# Patient Record
Sex: FEMALE | Race: WHITE | Employment: FULL TIME | URBAN - METROPOLITAN AREA
[De-identification: names, ages, dates, MRNs, and addresses within clinical notes are randomized per-mention and may not be internally consistent; named-entity substitution may affect disease eponyms.]

---

## 2017-01-09 ENCOUNTER — OFFICE VISIT (OUTPATIENT)
Dept: FAMILY MEDICINE CLINIC | Age: 42
End: 2017-01-09

## 2017-01-09 VITALS
BODY MASS INDEX: 50.05 KG/M2 | SYSTOLIC BLOOD PRESSURE: 126 MMHG | RESPIRATION RATE: 12 BRPM | HEIGHT: 62 IN | DIASTOLIC BLOOD PRESSURE: 76 MMHG | TEMPERATURE: 98 F | WEIGHT: 272 LBS | HEART RATE: 86 BPM

## 2017-01-09 DIAGNOSIS — J01.00 ACUTE NON-RECURRENT MAXILLARY SINUSITIS: Primary | ICD-10-CM

## 2017-01-09 PROCEDURE — 99213 OFFICE O/P EST LOW 20 MIN: CPT | Performed by: FAMILY MEDICINE

## 2017-01-09 RX ORDER — AMOXICILLIN 500 MG/1
1000 CAPSULE ORAL 2 TIMES DAILY
Qty: 40 CAPSULE | Refills: 0 | Status: SHIPPED | OUTPATIENT
Start: 2017-01-09 | End: 2017-01-19

## 2017-01-09 ASSESSMENT — PATIENT HEALTH QUESTIONNAIRE - PHQ9
1. LITTLE INTEREST OR PLEASURE IN DOING THINGS: 0
SUM OF ALL RESPONSES TO PHQ QUESTIONS 1-9: 0
2. FEELING DOWN, DEPRESSED OR HOPELESS: 0
SUM OF ALL RESPONSES TO PHQ9 QUESTIONS 1 & 2: 0

## 2017-01-30 RX ORDER — ATORVASTATIN CALCIUM 80 MG/1
80 TABLET, FILM COATED ORAL DAILY
Qty: 90 TABLET | Refills: 1 | Status: SHIPPED | OUTPATIENT
Start: 2017-01-30 | End: 2017-07-28 | Stop reason: SDUPTHER

## 2017-03-02 RX ORDER — FENOFIBRATE 145 MG/1
145 TABLET, COATED ORAL DAILY
Qty: 90 TABLET | Refills: 0 | Status: SHIPPED | OUTPATIENT
Start: 2017-03-02 | End: 2017-06-09 | Stop reason: SDUPTHER

## 2017-03-03 RX ORDER — METFORMIN HYDROCHLORIDE 500 MG/1
TABLET, EXTENDED RELEASE ORAL
Qty: 180 TABLET | Refills: 6 | Status: SHIPPED | OUTPATIENT
Start: 2017-03-03 | End: 2018-08-27 | Stop reason: SDUPTHER

## 2017-03-10 ENCOUNTER — EMPLOYEE WELLNESS (OUTPATIENT)
Dept: OTHER | Age: 42
End: 2017-03-10

## 2017-03-14 DIAGNOSIS — Z79.4 INSULIN DEPENDENT TYPE 2 DIABETES MELLITUS, CONTROLLED (HCC): ICD-10-CM

## 2017-03-14 DIAGNOSIS — E11.9 INSULIN DEPENDENT TYPE 2 DIABETES MELLITUS, CONTROLLED (HCC): ICD-10-CM

## 2017-03-21 RX ORDER — OMEPRAZOLE 20 MG/1
20 CAPSULE, DELAYED RELEASE ORAL 2 TIMES DAILY
Qty: 180 CAPSULE | Refills: 1 | Status: SHIPPED | OUTPATIENT
Start: 2017-03-21 | End: 2018-02-13 | Stop reason: SDUPTHER

## 2017-03-30 ENCOUNTER — OFFICE VISIT (OUTPATIENT)
Dept: ENDOCRINOLOGY | Age: 42
End: 2017-03-30

## 2017-03-30 VITALS
WEIGHT: 276 LBS | BODY MASS INDEX: 50.79 KG/M2 | DIASTOLIC BLOOD PRESSURE: 78 MMHG | HEART RATE: 85 BPM | RESPIRATION RATE: 16 BRPM | HEIGHT: 62 IN | OXYGEN SATURATION: 96 % | SYSTOLIC BLOOD PRESSURE: 132 MMHG

## 2017-03-30 DIAGNOSIS — I10 ESSENTIAL HYPERTENSION: ICD-10-CM

## 2017-03-30 DIAGNOSIS — Z79.4 INSULIN DEPENDENT TYPE 2 DIABETES MELLITUS, CONTROLLED (HCC): Primary | ICD-10-CM

## 2017-03-30 DIAGNOSIS — E78.49 OTHER HYPERLIPIDEMIA: ICD-10-CM

## 2017-03-30 DIAGNOSIS — E11.9 INSULIN DEPENDENT TYPE 2 DIABETES MELLITUS, CONTROLLED (HCC): Primary | ICD-10-CM

## 2017-03-30 LAB — HBA1C MFR BLD: 6.3 %

## 2017-03-30 PROCEDURE — 99214 OFFICE O/P EST MOD 30 MIN: CPT | Performed by: INTERNAL MEDICINE

## 2017-03-30 PROCEDURE — 83036 HEMOGLOBIN GLYCOSYLATED A1C: CPT | Performed by: INTERNAL MEDICINE

## 2017-03-31 LAB
CREATININE URINE: 115.4 MG/DL (ref 28–259)
MICROALBUMIN UR-MCNC: <1.2 MG/DL
MICROALBUMIN/CREAT UR-RTO: NORMAL MG/G (ref 0–30)

## 2017-04-10 RX ORDER — MAGNESIUM OXIDE 400 MG/1
TABLET ORAL
Qty: 90 TABLET | Refills: 1 | Status: SHIPPED | OUTPATIENT
Start: 2017-04-10 | End: 2018-01-08 | Stop reason: SDUPTHER

## 2017-04-13 ENCOUNTER — OFFICE VISIT (OUTPATIENT)
Dept: ORTHOPEDIC SURGERY | Age: 42
End: 2017-04-13

## 2017-04-13 DIAGNOSIS — M70.62 GREATER TROCHANTERIC BURSITIS OF LEFT HIP: Primary | ICD-10-CM

## 2017-04-13 PROCEDURE — 99999 PR OFFICE/OUTPT VISIT,PROCEDURE ONLY: CPT | Performed by: NURSE PRACTITIONER

## 2017-04-13 RX ORDER — METHYLPREDNISOLONE 4 MG/1
TABLET ORAL
Qty: 1 KIT | Refills: 0 | Status: SHIPPED | OUTPATIENT
Start: 2017-04-13 | End: 2017-11-09 | Stop reason: ALTCHOICE

## 2017-04-18 ENCOUNTER — TELEPHONE (OUTPATIENT)
Dept: ORTHOPEDIC SURGERY | Age: 42
End: 2017-04-18

## 2017-04-18 RX ORDER — CELECOXIB 200 MG/1
200 CAPSULE ORAL DAILY
Qty: 60 CAPSULE | Refills: 1 | Status: SHIPPED | OUTPATIENT
Start: 2017-04-18 | End: 2020-01-24 | Stop reason: ALTCHOICE

## 2017-06-09 RX ORDER — FENOFIBRATE 145 MG/1
TABLET, COATED ORAL
Qty: 90 TABLET | Refills: 0 | Status: SHIPPED | OUTPATIENT
Start: 2017-06-09 | End: 2017-09-05 | Stop reason: SDUPTHER

## 2017-07-07 RX ORDER — MAGNESIUM OXIDE 400 MG/1
TABLET ORAL
Qty: 90 TABLET | Refills: 1 | Status: SHIPPED | OUTPATIENT
Start: 2017-07-07 | End: 2017-11-09 | Stop reason: SDUPTHER

## 2017-07-20 ENCOUNTER — OFFICE VISIT (OUTPATIENT)
Dept: ENDOCRINOLOGY | Age: 42
End: 2017-07-20

## 2017-07-20 VITALS
DIASTOLIC BLOOD PRESSURE: 72 MMHG | HEIGHT: 62 IN | SYSTOLIC BLOOD PRESSURE: 128 MMHG | RESPIRATION RATE: 16 BRPM | HEART RATE: 76 BPM | BODY MASS INDEX: 51.38 KG/M2 | WEIGHT: 279.2 LBS

## 2017-07-20 DIAGNOSIS — E11.9 INSULIN DEPENDENT TYPE 2 DIABETES MELLITUS, CONTROLLED (HCC): Primary | ICD-10-CM

## 2017-07-20 DIAGNOSIS — Z79.4 INSULIN DEPENDENT TYPE 2 DIABETES MELLITUS, CONTROLLED (HCC): Primary | ICD-10-CM

## 2017-07-20 DIAGNOSIS — E78.49 OTHER HYPERLIPIDEMIA: ICD-10-CM

## 2017-07-20 DIAGNOSIS — I10 ESSENTIAL HYPERTENSION: ICD-10-CM

## 2017-07-20 LAB — HBA1C MFR BLD: 6.8 %

## 2017-07-20 PROCEDURE — 99214 OFFICE O/P EST MOD 30 MIN: CPT | Performed by: INTERNAL MEDICINE

## 2017-07-20 PROCEDURE — 83036 HEMOGLOBIN GLYCOSYLATED A1C: CPT | Performed by: INTERNAL MEDICINE

## 2017-07-26 ENCOUNTER — OFFICE VISIT (OUTPATIENT)
Dept: FAMILY MEDICINE CLINIC | Age: 42
End: 2017-07-26

## 2017-07-26 VITALS
RESPIRATION RATE: 12 BRPM | HEART RATE: 94 BPM | WEIGHT: 278 LBS | BODY MASS INDEX: 51.16 KG/M2 | SYSTOLIC BLOOD PRESSURE: 128 MMHG | DIASTOLIC BLOOD PRESSURE: 68 MMHG | HEIGHT: 62 IN

## 2017-07-26 DIAGNOSIS — G47.33 OSA ON CPAP: ICD-10-CM

## 2017-07-26 DIAGNOSIS — E11.69 DM TYPE 2 WITH DIABETIC DYSLIPIDEMIA (HCC): Primary | ICD-10-CM

## 2017-07-26 DIAGNOSIS — E78.5 DM TYPE 2 WITH DIABETIC DYSLIPIDEMIA (HCC): Primary | ICD-10-CM

## 2017-07-26 DIAGNOSIS — E83.42 HYPOMAGNESEMIA: ICD-10-CM

## 2017-07-26 DIAGNOSIS — Z12.39 BREAST CANCER SCREENING: ICD-10-CM

## 2017-07-26 DIAGNOSIS — K21.9 GASTROESOPHAGEAL REFLUX DISEASE WITHOUT ESOPHAGITIS: ICD-10-CM

## 2017-07-26 DIAGNOSIS — I10 ESSENTIAL HYPERTENSION: ICD-10-CM

## 2017-07-26 DIAGNOSIS — E78.00 PURE HYPERCHOLESTEROLEMIA: ICD-10-CM

## 2017-07-26 DIAGNOSIS — Z99.89 OSA ON CPAP: ICD-10-CM

## 2017-07-26 LAB
ALBUMIN SERPL-MCNC: 4.6 G/DL (ref 3.4–5)
ALP BLD-CCNC: 41 U/L (ref 40–129)
ALT SERPL-CCNC: 17 U/L (ref 10–40)
ANION GAP SERPL CALCULATED.3IONS-SCNC: 19 MMOL/L (ref 3–16)
AST SERPL-CCNC: 14 U/L (ref 15–37)
BILIRUB SERPL-MCNC: <0.2 MG/DL (ref 0–1)
BILIRUBIN DIRECT: <0.2 MG/DL (ref 0–0.3)
BILIRUBIN, INDIRECT: ABNORMAL MG/DL (ref 0–1)
BUN BLDV-MCNC: 20 MG/DL (ref 7–20)
CALCIUM SERPL-MCNC: 10.1 MG/DL (ref 8.3–10.6)
CHLORIDE BLD-SCNC: 103 MMOL/L (ref 99–110)
CO2: 21 MMOL/L (ref 21–32)
CREAT SERPL-MCNC: 0.7 MG/DL (ref 0.6–1.1)
GFR AFRICAN AMERICAN: >60
GFR NON-AFRICAN AMERICAN: >60
GLUCOSE BLD-MCNC: 150 MG/DL (ref 70–99)
MAGNESIUM: 2.1 MG/DL (ref 1.8–2.4)
POTASSIUM SERPL-SCNC: 4.3 MMOL/L (ref 3.5–5.1)
SODIUM BLD-SCNC: 143 MMOL/L (ref 136–145)
TOTAL PROTEIN: 7.4 G/DL (ref 6.4–8.2)

## 2017-07-26 PROCEDURE — 36415 COLL VENOUS BLD VENIPUNCTURE: CPT | Performed by: FAMILY MEDICINE

## 2017-07-26 PROCEDURE — 99214 OFFICE O/P EST MOD 30 MIN: CPT | Performed by: FAMILY MEDICINE

## 2017-07-28 RX ORDER — ATORVASTATIN CALCIUM 80 MG/1
TABLET, FILM COATED ORAL
Qty: 90 TABLET | Refills: 1 | Status: SHIPPED | OUTPATIENT
Start: 2017-07-28 | End: 2018-01-22 | Stop reason: SDUPTHER

## 2017-07-28 RX ORDER — HYDROCHLOROTHIAZIDE 25 MG/1
TABLET ORAL
Qty: 90 TABLET | Refills: 1 | Status: SHIPPED | OUTPATIENT
Start: 2017-07-28 | End: 2018-01-22 | Stop reason: SDUPTHER

## 2017-08-01 ENCOUNTER — TELEPHONE (OUTPATIENT)
Dept: PULMONOLOGY | Age: 42
End: 2017-08-01

## 2017-08-30 ENCOUNTER — HOSPITAL ENCOUNTER (OUTPATIENT)
Dept: WOMENS IMAGING | Age: 42
Discharge: HOME | End: 2017-08-30
Attending: FAMILY MEDICINE | Admitting: FAMILY MEDICINE

## 2017-08-30 DIAGNOSIS — Z12.39 BREAST CANCER SCREENING: ICD-10-CM

## 2017-09-05 RX ORDER — LISINOPRIL 40 MG/1
40 TABLET ORAL DAILY
Qty: 90 TABLET | Refills: 1 | Status: SHIPPED | OUTPATIENT
Start: 2017-09-05 | End: 2018-08-28 | Stop reason: SDUPTHER

## 2017-09-05 RX ORDER — BLOOD SUGAR DIAGNOSTIC
STRIP MISCELLANEOUS
Qty: 400 STRIP | Refills: 3 | Status: SHIPPED | OUTPATIENT
Start: 2017-09-05 | End: 2018-12-17 | Stop reason: SDUPTHER

## 2017-09-05 RX ORDER — FENOFIBRATE 145 MG/1
145 TABLET, COATED ORAL DAILY
Qty: 90 TABLET | Refills: 1 | Status: SHIPPED | OUTPATIENT
Start: 2017-09-05 | End: 2018-03-19 | Stop reason: SDUPTHER

## 2017-10-02 NOTE — TELEPHONE ENCOUNTER
From: Sylvia Jefferson  To:  Du Porter MD  Sent: 10/2/2017 8:48 AM EDT  Subject: Medication Renewal Request    Original authorizing provider: MD Sylvia Bolivar Mt would like a refill of the following medications:  canagliflozin (INVOKANA) 300 MG TABS tablet Osmel Goss MD]    Preferred pharmacy: [address] [place] [address] [phone] -  [phone]    Comment:

## 2017-10-11 DIAGNOSIS — Z79.4 INSULIN DEPENDENT TYPE 2 DIABETES MELLITUS, CONTROLLED (HCC): ICD-10-CM

## 2017-10-11 DIAGNOSIS — E11.9 INSULIN DEPENDENT TYPE 2 DIABETES MELLITUS, CONTROLLED (HCC): ICD-10-CM

## 2017-10-11 RX ORDER — PEN NEEDLE, DIABETIC 32GX 5/32"
NEEDLE, DISPOSABLE MISCELLANEOUS
Qty: 100 EACH | Refills: 3 | Status: SHIPPED | OUTPATIENT
Start: 2017-10-11 | End: 2018-04-30 | Stop reason: SDUPTHER

## 2017-10-24 ENCOUNTER — CLINICAL DOCUMENTATION (OUTPATIENT)
Dept: PHARMACY | Facility: CLINIC | Age: 42
End: 2017-10-24

## 2017-10-24 NOTE — PROGRESS NOTES
Pharmacy Pop Care Documentation:      The application for Westley Sensor for enrollment into the diabetes management program has been reviewed and accepted on 10/24/17.     Deanna Sims

## 2017-11-09 ENCOUNTER — OFFICE VISIT (OUTPATIENT)
Dept: ENDOCRINOLOGY | Age: 42
End: 2017-11-09

## 2017-11-09 VITALS
SYSTOLIC BLOOD PRESSURE: 122 MMHG | HEART RATE: 80 BPM | BODY MASS INDEX: 51.49 KG/M2 | HEIGHT: 62 IN | DIASTOLIC BLOOD PRESSURE: 72 MMHG | RESPIRATION RATE: 16 BRPM | WEIGHT: 279.8 LBS

## 2017-11-09 DIAGNOSIS — I10 ESSENTIAL HYPERTENSION: ICD-10-CM

## 2017-11-09 DIAGNOSIS — Z79.4 INSULIN DEPENDENT TYPE 2 DIABETES MELLITUS, CONTROLLED (HCC): Primary | ICD-10-CM

## 2017-11-09 DIAGNOSIS — E11.9 INSULIN DEPENDENT TYPE 2 DIABETES MELLITUS, CONTROLLED (HCC): Primary | ICD-10-CM

## 2017-11-09 LAB — HBA1C MFR BLD: 6.6 %

## 2017-11-09 PROCEDURE — 83036 HEMOGLOBIN GLYCOSYLATED A1C: CPT | Performed by: INTERNAL MEDICINE

## 2017-11-09 PROCEDURE — 99214 OFFICE O/P EST MOD 30 MIN: CPT | Performed by: INTERNAL MEDICINE

## 2017-11-09 RX ORDER — MEDROXYPROGESTERONE ACETATE 150 MG/ML
INJECTION, SUSPENSION INTRAMUSCULAR
Refills: 3 | COMMUNITY
Start: 2017-08-11 | End: 2017-11-09 | Stop reason: SDUPTHER

## 2017-11-09 NOTE — PROGRESS NOTES
Seen as f/u patient for diabetes    Diagnosed with Type 2 diabetes mellitus 4-5 yrs ago  Known diabetic complications: None    Current diabetic medications   U-500 pen 110/110   SSI 5 for 50 >150     Metformin ER  500mg BID    Invokana 300mg    Previous  lantus 110 units HS  Novolog 25 TID + SSI 5 for 50>150  Usually takes 35-40  januvia 100mg  Was on victoza in the past for 2 months  Bydureon    Insulin started 3/13    Intolerance to diabetes medications: yes - Metformin     Last A1c 6.6%<----- 6.8%<-----6.3 on 3/17<----6.3 on 12/16<------6.2 on 9/16<---5.9 on 6/16<-----6.0 on 2/16<-----6.1 on 7/15<----6.1 on 3/15<----7.0 on 10/14<---- 7.4<----9.6 on 5/14<---- 8.5 On 12/13<---- 9.6<---- 8.9 on 5/13<---10.8<---10.9    Prior visit with dietician: Yes   Current diet: on average, 3 meals per day 45gm CHO  Current exercise: No exercise for one month  Current monitoring regimen: home blood tests -1/week    Has brought blood glucose log/meter: yes  Home blood sugar records:   Any episodes of hypoglycemia? occ    No Hx of CAD , PVD, CVA    Hyperlipidemia: pravastatin 40mg fenofibrate 145   on 10/14-switched to lipitor 80mg LDL 73 on 2/16  Last eye exam: 10/16  Last foot exam:3/17  Last microalbumin to creatinine ratio: M/C nl on 3/17 ,   35 on 5/13 34 on 9/13  nl on 3/12  ACE-I or ARB: Lisinopril 40mg HCTZ 25mg    4/14  CGMS Report   CGMS insertion date 4/29/14 CGMS data collection was performed on 4/29/14 - 5/3/14. Patient provided written diet, activities and medical management for specified period. MAD 3.6 %   Average reading 252 mg/dL   Std Dev 49 mg/dL   % of time <70 mg/dL 0 %   % of time >140 mg/dL 100 %   Number of hypoglycemia episodes noted: 0   CGMS showed daytime and nocturnal hyperglycemia   Impression:   Daytime and nocturnal hyperglycemia . No documentation of U500 injections.  See scanned i-Pro download   Recommendation:   Advised patient to reduce carbohydrate intake to no more than 30 grams

## 2017-12-01 ENCOUNTER — E-VISIT (OUTPATIENT)
Dept: FAMILY MEDICINE CLINIC | Age: 42
End: 2017-12-01

## 2017-12-01 DIAGNOSIS — J01.90 ACUTE NON-RECURRENT SINUSITIS, UNSPECIFIED LOCATION: Primary | ICD-10-CM

## 2017-12-01 PROCEDURE — 99444 PR PHYSICIAN ONLINE EVALUATION & MANAGEMENT SERVICE: CPT | Performed by: FAMILY MEDICINE

## 2017-12-01 RX ORDER — AMOXICILLIN 500 MG/1
1000 CAPSULE ORAL 2 TIMES DAILY
Qty: 40 CAPSULE | Refills: 0 | Status: SHIPPED | OUTPATIENT
Start: 2017-12-01 | End: 2017-12-11

## 2017-12-01 ASSESSMENT — LIFESTYLE VARIABLES: SMOKING_STATUS: NO

## 2017-12-12 ENCOUNTER — OFFICE VISIT (OUTPATIENT)
Dept: PULMONOLOGY | Age: 42
End: 2017-12-12

## 2017-12-12 VITALS
HEART RATE: 80 BPM | DIASTOLIC BLOOD PRESSURE: 78 MMHG | SYSTOLIC BLOOD PRESSURE: 110 MMHG | RESPIRATION RATE: 16 BRPM | TEMPERATURE: 98.2 F | OXYGEN SATURATION: 96 % | BODY MASS INDEX: 51.53 KG/M2 | WEIGHT: 280 LBS | HEIGHT: 62 IN

## 2017-12-12 DIAGNOSIS — G47.33 OSA (OBSTRUCTIVE SLEEP APNEA): Primary | ICD-10-CM

## 2017-12-12 DIAGNOSIS — J30.81 CHRONIC ALLERGIC RHINITIS DUE TO ANIMAL HAIR AND DANDER: ICD-10-CM

## 2017-12-12 PROCEDURE — 99213 OFFICE O/P EST LOW 20 MIN: CPT | Performed by: INTERNAL MEDICINE

## 2017-12-12 ASSESSMENT — SLEEP AND FATIGUE QUESTIONNAIRES
HOW LIKELY ARE YOU TO NOD OFF OR FALL ASLEEP WHILE WATCHING TV: 0
HOW LIKELY ARE YOU TO NOD OFF OR FALL ASLEEP WHILE SITTING AND READING: 2
HOW LIKELY ARE YOU TO NOD OFF OR FALL ASLEEP IN A CAR, WHILE STOPPED FOR A FEW MINUTES IN TRAFFIC: 0
ESS TOTAL SCORE: 4
HOW LIKELY ARE YOU TO NOD OFF OR FALL ASLEEP WHEN YOU ARE A PASSENGER IN A CAR FOR AN HOUR WITHOUT A BREAK: 2
NECK CIRCUMFERENCE (INCHES): 17
HOW LIKELY ARE YOU TO NOD OFF OR FALL ASLEEP WHILE SITTING INACTIVE IN A PUBLIC PLACE: 0
HOW LIKELY ARE YOU TO NOD OFF OR FALL ASLEEP WHILE SITTING QUIETLY AFTER LUNCH WITHOUT ALCOHOL: 0
HOW LIKELY ARE YOU TO NOD OFF OR FALL ASLEEP WHILE SITTING AND TALKING TO SOMEONE: 0
HOW LIKELY ARE YOU TO NOD OFF OR FALL ASLEEP WHILE LYING DOWN TO REST IN THE AFTERNOON WHEN CIRCUMSTANCES PERMIT: 0

## 2017-12-12 NOTE — PROGRESS NOTES
SECTION      WISDOM TOOTH EXTRACTION         FAMILY HISTORY:  family history includes Cancer (age of onset: 32) in an other family member; Cancer (age of onset: 50) in her maternal aunt; Cancer (age of onset: 62) in her mother; Diabetes in her father; Heart Disease in her father, paternal aunt, paternal uncle, paternal uncle, paternal uncle, and paternal uncle; High Blood Pressure in her father; Stroke in her maternal grandfather. SOCIAL HISTORY:   reports that she quit smoking about 7 years ago. She has a 18.00 pack-year smoking history. She has never used smokeless tobacco.      ALLERGIES:  Patient has No Known Allergies. REVIEW OF SYSTEMS:  Constitutional: Negative for fever    HENT: Negative for sore throat  Eyes: Negative for redness   Respiratory: Negative for dyspnea, cough  Cardiovascular: Negative for chest pain  Neurological: Negative for syncope  Hematological: Negative for adenopathy  Psychiatric/Behavorial: Negative for anxiety    Objective:   PHYSICAL EXAM:  Blood pressure 110/78, pulse 80, temperature 98.2 °F (36.8 °C), temperature source Oral, resp. rate 16, height 5' 2\" (1.575 m), weight 280 lb (127 kg), SpO2 96 %, not currently breastfeeding.'  Gen: No distress. Obese Body mass index is 51.21 kg/m². Eyes: PERRL. No sclera icterus. No conjunctival injection. ENT: No discharge. Pharynx clear. External appearance of ears and nose normal. Mallampati  4    Resp: No accessory muscle use. No crackles. No wheezes. No rhonchi. CV: Regular rate. Regular rhythm. No murmur or rub. No edema. Skin: Warm, dry, normal texture and turgor. No nodule on exposed extremities. M/S: No cyanosis. No clubbing. No joint deformity. Neuro: Moves all four extremities. Psych: Oriented x 3. No anxiety. Awake. Alert. Intact judgement and insight.     Current Outpatient Prescriptions   Medication Sig Dispense Refill    empagliflozin (JARDIANCE) 25 MG tablet Take 25 mg by mouth daily 90 tablet 3 4.3 07/26/2017     07/26/2017    CO2 21 07/26/2017    CO2 24 09/30/2016    CO2 24 02/26/2016    BUN 20 07/26/2017    CREATININE 0.7 07/26/2017    GLUCOSE 150 07/26/2017    CALCIUM 10.1 07/26/2017       Last ABG  POC Blood Gas:   No results found for: POCPH  No results for input(s): PH, PCO2, PO2, HCO3, BE, O2SAT in the last 72 hours. Assessment:     ·  SUNDAY  · Allergic rhinitis    Plan:         1. SUNDAY (obstructive sleep apnea)  Tg Seals  is deriving benefit from PAP demonstrated by improved [place], AHI, symptoms. PAP download information:  Usage > 4 hours  98 %   Pressure setting  na cwp    AHI with usage  na             2. Allergic rhinitis due to animal hair and dander    Patanase prn with sinus rinse prn. Controlled without it now.

## 2018-01-08 RX ORDER — MAGNESIUM OXIDE 400 MG/1
400 TABLET ORAL DAILY
Qty: 90 TABLET | Refills: 1 | Status: SHIPPED | OUTPATIENT
Start: 2018-01-08 | End: 2018-07-30 | Stop reason: SDUPTHER

## 2018-01-11 ENCOUNTER — TELEPHONE (OUTPATIENT)
Dept: PHARMACY | Facility: CLINIC | Age: 43
End: 2018-01-11

## 2018-01-11 NOTE — TELEPHONE ENCOUNTER
110/78   11/09/17 122/72   07/26/17 128/68     Lab Results   Component Value Date    LABMICR <1.20 03/31/2017     Lab Results   Component Value Date    LABA1C 6.6 11/09/2017    LABA1C 6.8 07/20/2017    LABA1C 6.3 03/30/2017     Lab Results   Component Value Date    CHOL 137 03/10/2017     Lab Results   Component Value Date    TRIG 139 03/10/2017     Lab Results   Component Value Date    HDL 31 (L) 03/10/2017     Lab Results   Component Value Date    LDLCALC 78 03/10/2017    LDLDIRECT 129 (H) 02/18/2013     ALT   Date Value Ref Range Status   07/26/2017 17 10 - 40 U/L Final     The 10-year ASCVD risk score (Raheel Shankar et al., 2013) is: 1.9%    Values used to calculate the score:      Age: 38 years      Sex: Female      Is Non- : No      Diabetic: Yes      Tobacco smoker: No      Systolic Blood Pressure: 928 mmHg      Is BP treated: Yes      HDL Cholesterol: 31 mg/dL      Total Cholesterol: 137 mg/dL     CrCl cannot be calculated (Patient's most recent lab result is older than the maximum 90 days allowed. ).   eGFR: > 60 mL/min/1.73 m^2    Immunizations:  Immunization History   Administered Date(s) Administered    Influenza Vaccine, unspecified formulation 10/10/2016    Influenza Virus Vaccine 09/28/2017    Pneumococcal Polysaccharide ([id number]) 02/26/2016    Tdap (Boostrix, Adacel) 06/27/2012      Smoking Status:  History   Smoking Status    Former Smoker    Packs/day: 1.00    Years: 18.00    Quit date: 9/1/2010   Smokeless Tobacco    Never Used      ASSESSMENT:  Initial Program Requirements (to be completed by 7/1/2018):  [] OV with provider for DM (1st)  [] A1c (1st)  [x] On statin or contraindication(s) atorvastatin  [x] On ACEi/ARB or contraindication(s) lisinopril     Ongoing Program Requirements (to be completed by 12/15/2018):  [] OV with provider for DM (2nd)  [] ACC/diabetes educator visit (if A1c over 8%)  [] A1c (2nd)  [] Lipid panel  [] Urine protein  [x] Pneumococcal

## 2018-01-12 ENCOUNTER — TELEPHONE (OUTPATIENT)
Dept: ENDOCRINOLOGY | Age: 43
End: 2018-01-12

## 2018-01-12 DIAGNOSIS — E11.69 DM TYPE 2 WITH DIABETIC DYSLIPIDEMIA (HCC): Primary | ICD-10-CM

## 2018-01-12 DIAGNOSIS — E78.5 DM TYPE 2 WITH DIABETIC DYSLIPIDEMIA (HCC): Primary | ICD-10-CM

## 2018-01-12 RX ORDER — ASPIRIN 81 MG/1
81 TABLET ORAL DAILY
Qty: 100 TABLET | Refills: 3 | Status: SHIPPED | OUTPATIENT
Start: 2018-01-12 | End: 2018-07-16 | Stop reason: SDUPTHER

## 2018-01-12 NOTE — TELEPHONE ENCOUNTER
Dr. Freddie Thompson: patient is currently enrolled in [address] [place] [address] Diabetes Program.   · Pt is using OTC aspirin, would like Rx ($0 copay under DM program with Rx)  · May not need for primary prevention due to age less than 48 yrs? - please advise if pt should otherwise discontinue ASA  · See 1/11/18 pharmacy visit for details     Order pending for provider convenience, please review/modify & sign if in agreement.     Thank you!   Reinaldo Johnson, PharmD, [facility] Pharmacist  Direct: [phone]  Dept: [phone] (toll free [phone], option 7)

## 2018-01-22 RX ORDER — HYDROCHLOROTHIAZIDE 25 MG/1
25 TABLET ORAL DAILY
Qty: 90 TABLET | Refills: 0 | Status: SHIPPED | OUTPATIENT
Start: 2018-01-22 | End: 2018-04-09 | Stop reason: SDUPTHER

## 2018-01-22 RX ORDER — ATORVASTATIN CALCIUM 80 MG/1
80 TABLET, FILM COATED ORAL DAILY
Qty: 90 TABLET | Refills: 0 | Status: SHIPPED | OUTPATIENT
Start: 2018-01-22 | End: 2018-05-08 | Stop reason: SDUPTHER

## 2018-01-22 NOTE — TELEPHONE ENCOUNTER
From: Danish Cannon  Sent: 1/22/2018 11:50 AM EST  Subject: Medication Renewal Request    Danish Cannon would like a refill of the following medications:  atorvastatin (LIPITOR) 80 MG tablet Josef Parker MD]  hydrochlorothiazide (HYDRODIURIL) 25 MG tablet Josef Parker MD]    Preferred pharmacy: [address] [place], [place] [phone] -  [phone]    Comment:

## 2018-02-14 RX ORDER — OMEPRAZOLE 20 MG/1
20 CAPSULE, DELAYED RELEASE ORAL 2 TIMES DAILY
Qty: 180 CAPSULE | Refills: 1 | Status: SHIPPED | OUTPATIENT
Start: 2018-02-14 | End: 2019-02-19 | Stop reason: SDUPTHER

## 2018-03-02 ENCOUNTER — OFFICE VISIT (OUTPATIENT)
Dept: FAMILY MEDICINE CLINIC | Age: 43
End: 2018-03-02

## 2018-03-02 VITALS
BODY MASS INDEX: 51.34 KG/M2 | DIASTOLIC BLOOD PRESSURE: 82 MMHG | RESPIRATION RATE: 12 BRPM | HEIGHT: 62 IN | SYSTOLIC BLOOD PRESSURE: 128 MMHG | HEART RATE: 84 BPM | WEIGHT: 279 LBS

## 2018-03-02 DIAGNOSIS — G47.33 OSA ON CPAP: ICD-10-CM

## 2018-03-02 DIAGNOSIS — E78.5 DM TYPE 2 WITH DIABETIC DYSLIPIDEMIA (HCC): Primary | ICD-10-CM

## 2018-03-02 DIAGNOSIS — E78.00 PURE HYPERCHOLESTEROLEMIA: ICD-10-CM

## 2018-03-02 DIAGNOSIS — I10 ESSENTIAL HYPERTENSION: ICD-10-CM

## 2018-03-02 DIAGNOSIS — E11.69 DM TYPE 2 WITH DIABETIC DYSLIPIDEMIA (HCC): Primary | ICD-10-CM

## 2018-03-02 DIAGNOSIS — K21.9 GASTROESOPHAGEAL REFLUX DISEASE WITHOUT ESOPHAGITIS: ICD-10-CM

## 2018-03-02 DIAGNOSIS — Z99.89 OSA ON CPAP: ICD-10-CM

## 2018-03-02 DIAGNOSIS — E83.42 HYPOMAGNESEMIA: ICD-10-CM

## 2018-03-02 LAB — HBA1C MFR BLD: 6.8 %

## 2018-03-02 PROCEDURE — 83036 HEMOGLOBIN GLYCOSYLATED A1C: CPT | Performed by: FAMILY MEDICINE

## 2018-03-02 PROCEDURE — 99214 OFFICE O/P EST MOD 30 MIN: CPT | Performed by: FAMILY MEDICINE

## 2018-03-02 ASSESSMENT — PATIENT HEALTH QUESTIONNAIRE - PHQ9
SUM OF ALL RESPONSES TO PHQ9 QUESTIONS 1 & 2: 0
2. FEELING DOWN, DEPRESSED OR HOPELESS: 0
1. LITTLE INTEREST OR PLEASURE IN DOING THINGS: 0
SUM OF ALL RESPONSES TO PHQ QUESTIONS 1-9: 0

## 2018-03-06 DIAGNOSIS — E78.00 PURE HYPERCHOLESTEROLEMIA: ICD-10-CM

## 2018-03-06 DIAGNOSIS — I10 ESSENTIAL HYPERTENSION: ICD-10-CM

## 2018-03-06 LAB
ANION GAP SERPL CALCULATED.3IONS-SCNC: 19 MMOL/L (ref 3–16)
BUN BLDV-MCNC: 17 MG/DL (ref 7–20)
CALCIUM SERPL-MCNC: 9.5 MG/DL (ref 8.3–10.6)
CHLORIDE BLD-SCNC: 102 MMOL/L (ref 99–110)
CHOLESTEROL, TOTAL: 165 MG/DL (ref 0–199)
CO2: 24 MMOL/L (ref 21–32)
CREAT SERPL-MCNC: 0.7 MG/DL (ref 0.6–1.1)
GFR AFRICAN AMERICAN: >60
GFR NON-AFRICAN AMERICAN: >60
GLUCOSE BLD-MCNC: 145 MG/DL (ref 70–99)
HDLC SERPL-MCNC: 29 MG/DL (ref 40–60)
LDL CHOLESTEROL CALCULATED: ABNORMAL MG/DL
LDL CHOLESTEROL DIRECT: 96 MG/DL
POTASSIUM SERPL-SCNC: 4.1 MMOL/L (ref 3.5–5.1)
SODIUM BLD-SCNC: 145 MMOL/L (ref 136–145)
TRIGL SERPL-MCNC: 342 MG/DL (ref 0–150)
VLDLC SERPL CALC-MCNC: ABNORMAL MG/DL

## 2018-03-19 RX ORDER — FENOFIBRATE 145 MG/1
145 TABLET, COATED ORAL DAILY
Qty: 90 TABLET | Refills: 1 | Status: SHIPPED | OUTPATIENT
Start: 2018-03-19 | End: 2018-10-08 | Stop reason: SDUPTHER

## 2018-03-19 NOTE — TELEPHONE ENCOUNTER
From: Lars Ross  Sent: 3/19/2018 1:42 PM EDT  Subject: Medication Renewal Request    Lars Ross would like a refill of the following medications:     fenofibrate (TRICOR) 145 MG tablet Ann Doe MD]    Preferred pharmacy: [address] [place], [place] [phone] -  [phone]    Comment:

## 2018-03-20 VITALS — WEIGHT: 280 LBS | BODY MASS INDEX: 51.21 KG/M2

## 2018-04-09 RX ORDER — HYDROCHLOROTHIAZIDE 25 MG/1
25 TABLET ORAL DAILY
Qty: 90 TABLET | Refills: 0 | Status: SHIPPED | OUTPATIENT
Start: 2018-04-09 | End: 2018-07-02 | Stop reason: SDUPTHER

## 2018-04-30 DIAGNOSIS — E11.9 INSULIN DEPENDENT TYPE 2 DIABETES MELLITUS, CONTROLLED (HCC): ICD-10-CM

## 2018-04-30 DIAGNOSIS — Z79.4 INSULIN DEPENDENT TYPE 2 DIABETES MELLITUS, CONTROLLED (HCC): ICD-10-CM

## 2018-05-03 ENCOUNTER — OFFICE VISIT (OUTPATIENT)
Dept: ENDOCRINOLOGY | Age: 43
End: 2018-05-03

## 2018-05-03 VITALS
BODY MASS INDEX: 50.79 KG/M2 | SYSTOLIC BLOOD PRESSURE: 132 MMHG | WEIGHT: 276 LBS | DIASTOLIC BLOOD PRESSURE: 78 MMHG | HEIGHT: 62 IN | RESPIRATION RATE: 16 BRPM | HEART RATE: 85 BPM | OXYGEN SATURATION: 97 %

## 2018-05-03 DIAGNOSIS — Z79.4 INSULIN DEPENDENT TYPE 2 DIABETES MELLITUS, CONTROLLED (HCC): Primary | ICD-10-CM

## 2018-05-03 DIAGNOSIS — E11.9 INSULIN DEPENDENT TYPE 2 DIABETES MELLITUS, CONTROLLED (HCC): Primary | ICD-10-CM

## 2018-05-03 DIAGNOSIS — E78.49 OTHER HYPERLIPIDEMIA: ICD-10-CM

## 2018-05-03 DIAGNOSIS — I10 ESSENTIAL HYPERTENSION: ICD-10-CM

## 2018-05-03 LAB — HBA1C MFR BLD: 6.8 %

## 2018-05-03 PROCEDURE — 99214 OFFICE O/P EST MOD 30 MIN: CPT | Performed by: INTERNAL MEDICINE

## 2018-05-03 PROCEDURE — 83036 HEMOGLOBIN GLYCOSYLATED A1C: CPT | Performed by: INTERNAL MEDICINE

## 2018-05-04 LAB
CREATININE URINE: 99.6 MG/DL (ref 28–259)
MICROALBUMIN UR-MCNC: <1.2 MG/DL
MICROALBUMIN/CREAT UR-RTO: NORMAL MG/G (ref 0–30)

## 2018-05-08 RX ORDER — ATORVASTATIN CALCIUM 80 MG/1
80 TABLET, FILM COATED ORAL DAILY
Qty: 90 TABLET | Refills: 0 | Status: SHIPPED | OUTPATIENT
Start: 2018-05-08 | End: 2018-07-30 | Stop reason: SDUPTHER

## 2018-07-02 RX ORDER — HYDROCHLOROTHIAZIDE 25 MG/1
25 TABLET ORAL DAILY
Qty: 90 TABLET | Refills: 0 | Status: SHIPPED | OUTPATIENT
Start: 2018-07-02 | End: 2019-01-14 | Stop reason: SDUPTHER

## 2018-07-02 NOTE — TELEPHONE ENCOUNTER
From: Anny Maharaj  Sent: 7/2/2018 3:19 PM EDT  Subject: Medication Renewal Request    Anny Maharaj would like a refill of the following medications:     hydrochlorothiazide (HYDRODIURIL) 25 MG tablet Evelia Akhtar MD]    Preferred pharmacy: [address] [place] [phone] -  [phone]

## 2018-07-16 DIAGNOSIS — E78.5 DM TYPE 2 WITH DIABETIC DYSLIPIDEMIA (HCC): ICD-10-CM

## 2018-07-16 DIAGNOSIS — E11.69 DM TYPE 2 WITH DIABETIC DYSLIPIDEMIA (HCC): ICD-10-CM

## 2018-07-16 RX ORDER — ASPIRIN 81 MG/1
81 TABLET ORAL DAILY
Qty: 100 TABLET | Refills: 3 | Status: SHIPPED | OUTPATIENT
Start: 2018-07-16 | End: 2019-07-15 | Stop reason: SDUPTHER

## 2018-07-23 DIAGNOSIS — E11.9 INSULIN DEPENDENT TYPE 2 DIABETES MELLITUS, CONTROLLED (HCC): ICD-10-CM

## 2018-07-23 DIAGNOSIS — Z79.4 INSULIN DEPENDENT TYPE 2 DIABETES MELLITUS, CONTROLLED (HCC): ICD-10-CM

## 2018-07-23 NOTE — TELEPHONE ENCOUNTER
From: Vanesa Sharma  Sent: 7/23/2018 10:11 AM EDT  Subject: Medication Renewal Request    Vanesa Sharma would like a refill of the following medications:     insulin regular human (HUMULIN R U-500 KWIKPEN) 500 UNIT/ML SOPN concentrated injection pen Jay Ma MD]     Insulin Pen Needle (TRUEPLUS PEN NEEDLES) 32G X 4 MM MISC Jay Ma MD]    Preferred pharmacy: [address] [place], [place] [phone] -  [phone]

## 2018-07-30 DIAGNOSIS — E78.49 OTHER HYPERLIPIDEMIA: Primary | ICD-10-CM

## 2018-07-30 DIAGNOSIS — E83.42 HYPOMAGNESEMIA: Primary | ICD-10-CM

## 2018-07-30 RX ORDER — ATORVASTATIN CALCIUM 80 MG/1
80 TABLET, FILM COATED ORAL DAILY
Qty: 90 TABLET | Refills: 0 | Status: SHIPPED | OUTPATIENT
Start: 2018-07-30 | End: 2018-10-22 | Stop reason: SDUPTHER

## 2018-07-30 RX ORDER — MAGNESIUM OXIDE 400 MG/1
400 TABLET ORAL DAILY
Qty: 90 TABLET | Refills: 1 | Status: SHIPPED | OUTPATIENT
Start: 2018-07-30 | End: 2018-08-23 | Stop reason: SDUPTHER

## 2018-07-30 NOTE — TELEPHONE ENCOUNTER
From: Lottie Fleming  Sent: 7/30/2018 9:53 AM EDT  Subject: Medication Renewal Request    Lottie Fleming would like a refill of the following medications:     magnesium oxide (MAG-OX) 400 MG tablet Gila Shin MD]    Preferred pharmacy: [address] [place] [address] [phone] - F [phone]

## 2018-08-23 ENCOUNTER — OFFICE VISIT (OUTPATIENT)
Dept: ENDOCRINOLOGY | Age: 43
End: 2018-08-23

## 2018-08-23 VITALS
HEIGHT: 62 IN | WEIGHT: 273.8 LBS | RESPIRATION RATE: 16 BRPM | BODY MASS INDEX: 50.38 KG/M2 | DIASTOLIC BLOOD PRESSURE: 70 MMHG | HEART RATE: 76 BPM | SYSTOLIC BLOOD PRESSURE: 120 MMHG | OXYGEN SATURATION: 97 %

## 2018-08-23 DIAGNOSIS — E78.5 DM TYPE 2 WITH DIABETIC DYSLIPIDEMIA (HCC): Primary | ICD-10-CM

## 2018-08-23 DIAGNOSIS — I10 ESSENTIAL HYPERTENSION: ICD-10-CM

## 2018-08-23 DIAGNOSIS — E78.49 OTHER HYPERLIPIDEMIA: ICD-10-CM

## 2018-08-23 DIAGNOSIS — E11.69 DM TYPE 2 WITH DIABETIC DYSLIPIDEMIA (HCC): Primary | ICD-10-CM

## 2018-08-23 LAB — HBA1C MFR BLD: 6.6 %

## 2018-08-23 PROCEDURE — 83036 HEMOGLOBIN GLYCOSYLATED A1C: CPT | Performed by: INTERNAL MEDICINE

## 2018-08-23 PROCEDURE — 99214 OFFICE O/P EST MOD 30 MIN: CPT | Performed by: INTERNAL MEDICINE

## 2018-08-23 NOTE — PROGRESS NOTES
Seen as f/u patient for diabetes    Interim:   Diagnosed with Type 2 diabetes mellitus 4-5 yrs ago  Known diabetic complications: None    Current diabetic medications   U-500 pen 110/110   SSI 5 for 50 >150     Metformin ER  500mg BID  jardiance 25mg    Previous  lantus 110 units HS  Novolog 25 TID + SSI 5 for 50>150  Usually takes 35-40  januvia 100mg  Was on victoza in the past for 2 months  Bydureon    Insulin started 3/13    Intolerance to diabetes medications: yes - Metformin     Last A1c 6.6%<------6.6%<----- 6.8%<-----6.3 on 3/17<----6.3 on 12/16<------6.2 on 9/16<---5.9 on 6/16<-----6.0 on 2/16<-----6.1 on 7/15<----6.1 on 3/15<----7.0 on 10/14<---- 7.4<----9.6 on 5/14<---- 8.5 On 12/13<---- 9.6<---- 8.9 on 5/13<---10.8<---10.9    Prior visit with dietician: Yes   Current diet: on average, 3 meals per day 45gm CHO  Current exercise: No exercise for one month  Current monitoring regimen: home blood tests -1/week    Has brought blood glucose log/meter: yes  Home blood sugar records:   Any episodes of hypoglycemia? occ    No Hx of CAD , PVD, CVA    Hyperlipidemia: pravastatin 40mg fenofibrate 145   on 10/14-switched to lipitor 80mg LDL 73 on 2/16  LDL 96  HDL 29 on 3/18  Tolerating , no aches  Last eye exam: 10/17  Last foot exam:5/18  Last microalbumin to creatinine ratio: M/C nl on 5/18  ,   35 on 5/13 34 on 9/13  nl on 3/12  HTN: Stable, tolerating Lisinopril 40mg HCTZ 25mg    4/14  CGMS Report   CGMS insertion date 4/29/14 CGMS data collection was performed on 4/29/14 - 5/3/14. Patient provided written diet, activities and medical management for specified period. MAD 3.6 %   Average reading 252 mg/dL   Std Dev 49 mg/dL   % of time <70 mg/dL 0 %   % of time >140 mg/dL 100 %   Number of hypoglycemia episodes noted: 0   CGMS showed daytime and nocturnal hyperglycemia   Impression:   Daytime and nocturnal hyperglycemia . No documentation of U500 injections.  See scanned i-Pro download blood glucose readings have improved, A1c at goal. Continue same  2. HTN;At goal  3. HLD:LDL at goal, on lipitor. TG high, may need to restart fenofibrate  4. Obesity:Recommend aggressive life style and discussed bariatric option  5. SUNDAY: using Cpap machine more now    Plan:       U-500  110 units BID   SSI 5 for 50>150    jardiance 25mg    Metformin ER 500mg BID   Discussed in detail use of U-500. Discussed side effects of hypoglcyemia   Advise to check blood sugar 2 times a day   Patient to send blood sugar log for titration. Advise to exercise regularly. Advise to low simple carbohydrate and protein with each  meal diet. Diabetes Care: recommend yearly eye exam, foot exam and urine microalbumin to   creatinine ratio.      -Hyperlipidemia, LDL goal is <100 mg/dl   -Hypertension:Continue current  -Daily ASA:Not indicated  -Smoking status:Non smoker

## 2018-08-27 RX ORDER — METFORMIN HYDROCHLORIDE 500 MG/1
500 TABLET, EXTENDED RELEASE ORAL 2 TIMES DAILY
Qty: 180 TABLET | Refills: 6 | Status: SHIPPED | OUTPATIENT
Start: 2018-08-27 | End: 2019-09-30 | Stop reason: SDUPTHER

## 2018-08-28 RX ORDER — LISINOPRIL 40 MG/1
40 TABLET ORAL DAILY
Qty: 90 TABLET | Refills: 1 | Status: SHIPPED | OUTPATIENT
Start: 2018-08-28 | End: 2019-08-20 | Stop reason: SDUPTHER

## 2018-08-28 NOTE — TELEPHONE ENCOUNTER
From: Javy Clark  Sent: 8/27/2018 11:44 AM EDT  Subject: Medication Renewal Request    Javy Clark would like a refill of the following medications:     lisinopril (PRINIVIL;ZESTRIL) 40 MG tablet Gloria Hernandez MD]    Preferred pharmacy: [address] [place] [phone] -  [phone]        Medication renewals requested in this message routed separately:     metFORMIN (GLUCOPHAGE-XR) 500 MG extended release tablet Mara Genao MD]

## 2018-10-08 DIAGNOSIS — E78.49 OTHER HYPERLIPIDEMIA: Primary | ICD-10-CM

## 2018-10-08 RX ORDER — FENOFIBRATE 145 MG/1
145 TABLET, COATED ORAL DAILY
Qty: 90 TABLET | Refills: 1 | Status: SHIPPED | OUTPATIENT
Start: 2018-10-08 | End: 2019-04-17 | Stop reason: SDUPTHER

## 2018-10-22 DIAGNOSIS — E78.49 OTHER HYPERLIPIDEMIA: Primary | ICD-10-CM

## 2018-10-22 RX ORDER — ATORVASTATIN CALCIUM 80 MG/1
80 TABLET, FILM COATED ORAL DAILY
Qty: 90 TABLET | Refills: 0 | Status: SHIPPED | OUTPATIENT
Start: 2018-10-22 | End: 2019-01-28 | Stop reason: SDUPTHER

## 2018-10-26 ENCOUNTER — OFFICE VISIT (OUTPATIENT)
Dept: FAMILY MEDICINE CLINIC | Age: 43
End: 2018-10-26
Payer: COMMERCIAL

## 2018-10-26 VITALS
DIASTOLIC BLOOD PRESSURE: 82 MMHG | RESPIRATION RATE: 16 BRPM | HEART RATE: 78 BPM | BODY MASS INDEX: 51.3 KG/M2 | SYSTOLIC BLOOD PRESSURE: 134 MMHG | OXYGEN SATURATION: 97 % | WEIGHT: 278.8 LBS | HEIGHT: 62 IN

## 2018-10-26 DIAGNOSIS — Z99.89 OSA ON CPAP: ICD-10-CM

## 2018-10-26 DIAGNOSIS — G47.33 OSA ON CPAP: ICD-10-CM

## 2018-10-26 DIAGNOSIS — I10 ESSENTIAL HYPERTENSION: ICD-10-CM

## 2018-10-26 DIAGNOSIS — E78.49 OTHER HYPERLIPIDEMIA: ICD-10-CM

## 2018-10-26 DIAGNOSIS — K21.9 GASTROESOPHAGEAL REFLUX DISEASE WITHOUT ESOPHAGITIS: ICD-10-CM

## 2018-10-26 DIAGNOSIS — E83.42 HYPOMAGNESEMIA: ICD-10-CM

## 2018-10-26 DIAGNOSIS — E11.69 DM TYPE 2 WITH DIABETIC DYSLIPIDEMIA (HCC): Primary | ICD-10-CM

## 2018-10-26 DIAGNOSIS — E78.5 DM TYPE 2 WITH DIABETIC DYSLIPIDEMIA (HCC): Primary | ICD-10-CM

## 2018-10-26 DIAGNOSIS — Z12.39 BREAST CANCER SCREENING: ICD-10-CM

## 2018-10-26 LAB
A/G RATIO: 1.9 (ref 1.1–2.2)
ALBUMIN SERPL-MCNC: 4.7 G/DL (ref 3.4–5)
ALP BLD-CCNC: 54 U/L (ref 40–129)
ALT SERPL-CCNC: 20 U/L (ref 10–40)
ANION GAP SERPL CALCULATED.3IONS-SCNC: 17 MMOL/L (ref 3–16)
AST SERPL-CCNC: 17 U/L (ref 15–37)
BILIRUB SERPL-MCNC: <0.2 MG/DL (ref 0–1)
BUN BLDV-MCNC: 16 MG/DL (ref 7–20)
CALCIUM SERPL-MCNC: 9.6 MG/DL (ref 8.3–10.6)
CHLORIDE BLD-SCNC: 108 MMOL/L (ref 99–110)
CO2: 21 MMOL/L (ref 21–32)
CREAT SERPL-MCNC: 0.6 MG/DL (ref 0.6–1.1)
GFR AFRICAN AMERICAN: >60
GFR NON-AFRICAN AMERICAN: >60
GLOBULIN: 2.5 G/DL
GLUCOSE BLD-MCNC: 124 MG/DL (ref 70–99)
MAGNESIUM: 1.4 MG/DL (ref 1.8–2.4)
POTASSIUM SERPL-SCNC: 3.9 MMOL/L (ref 3.5–5.1)
SODIUM BLD-SCNC: 146 MMOL/L (ref 136–145)
TOTAL PROTEIN: 7.2 G/DL (ref 6.4–8.2)

## 2018-10-26 PROCEDURE — 99214 OFFICE O/P EST MOD 30 MIN: CPT | Performed by: FAMILY MEDICINE

## 2018-10-26 PROCEDURE — 36415 COLL VENOUS BLD VENIPUNCTURE: CPT | Performed by: FAMILY MEDICINE

## 2018-11-29 ENCOUNTER — HOSPITAL ENCOUNTER (OUTPATIENT)
Dept: WOMENS IMAGING | Age: 43
Discharge: HOME | End: 2018-11-29
Payer: COMMERCIAL

## 2018-11-29 DIAGNOSIS — Z12.39 BREAST CANCER SCREENING: ICD-10-CM

## 2018-11-29 PROCEDURE — 77067 SCR MAMMO BI INCL CAD: CPT

## 2018-12-10 DIAGNOSIS — E11.9 INSULIN DEPENDENT TYPE 2 DIABETES MELLITUS, CONTROLLED (HCC): ICD-10-CM

## 2018-12-10 DIAGNOSIS — Z79.4 INSULIN DEPENDENT TYPE 2 DIABETES MELLITUS, CONTROLLED (HCC): ICD-10-CM

## 2018-12-17 RX ORDER — BLOOD SUGAR DIAGNOSTIC
STRIP MISCELLANEOUS
Qty: 400 STRIP | Refills: 3 | Status: SHIPPED | OUTPATIENT
Start: 2018-12-17 | End: 2020-07-22 | Stop reason: SDUPTHER

## 2019-01-03 ENCOUNTER — OFFICE VISIT (OUTPATIENT)
Dept: ENDOCRINOLOGY | Age: 44
End: 2019-01-03
Payer: COMMERCIAL

## 2019-01-03 VITALS
WEIGHT: 275.4 LBS | RESPIRATION RATE: 16 BRPM | OXYGEN SATURATION: 97 % | HEIGHT: 62 IN | HEART RATE: 88 BPM | SYSTOLIC BLOOD PRESSURE: 132 MMHG | DIASTOLIC BLOOD PRESSURE: 68 MMHG | BODY MASS INDEX: 50.68 KG/M2

## 2019-01-03 DIAGNOSIS — I10 ESSENTIAL HYPERTENSION: ICD-10-CM

## 2019-01-03 DIAGNOSIS — Z79.4 INSULIN DEPENDENT TYPE 2 DIABETES MELLITUS, CONTROLLED (HCC): Primary | ICD-10-CM

## 2019-01-03 DIAGNOSIS — E78.49 OTHER HYPERLIPIDEMIA: ICD-10-CM

## 2019-01-03 DIAGNOSIS — E11.9 INSULIN DEPENDENT TYPE 2 DIABETES MELLITUS, CONTROLLED (HCC): Primary | ICD-10-CM

## 2019-01-03 LAB — HBA1C MFR BLD: 7 %

## 2019-01-03 PROCEDURE — 83036 HEMOGLOBIN GLYCOSYLATED A1C: CPT | Performed by: INTERNAL MEDICINE

## 2019-01-03 PROCEDURE — 99214 OFFICE O/P EST MOD 30 MIN: CPT | Performed by: INTERNAL MEDICINE

## 2019-01-09 ENCOUNTER — PATIENT MESSAGE (OUTPATIENT)
Dept: PHARMACY | Facility: CLINIC | Age: 44
End: 2019-01-09

## 2019-01-14 RX ORDER — HYDROCHLOROTHIAZIDE 25 MG/1
25 TABLET ORAL DAILY
Qty: 90 TABLET | Refills: 0 | Status: SHIPPED | OUTPATIENT
Start: 2019-01-14 | End: 2019-04-17 | Stop reason: SDUPTHER

## 2019-01-16 ENCOUNTER — SCHEDULED TELEPHONE ENCOUNTER (OUTPATIENT)
Dept: PHARMACY | Facility: CLINIC | Age: 44
End: 2019-01-16

## 2019-01-16 DIAGNOSIS — Z79.4 INSULIN DEPENDENT TYPE 2 DIABETES MELLITUS, CONTROLLED (HCC): ICD-10-CM

## 2019-01-16 DIAGNOSIS — E11.9 INSULIN DEPENDENT TYPE 2 DIABETES MELLITUS, CONTROLLED (HCC): ICD-10-CM

## 2019-01-17 ENCOUNTER — E-VISIT (OUTPATIENT)
Dept: FAMILY MEDICINE CLINIC | Age: 44
End: 2019-01-17
Payer: COMMERCIAL

## 2019-01-17 ENCOUNTER — OFFICE VISIT (OUTPATIENT)
Dept: PULMONOLOGY | Age: 44
End: 2019-01-17
Payer: COMMERCIAL

## 2019-01-17 VITALS
RESPIRATION RATE: 16 BRPM | DIASTOLIC BLOOD PRESSURE: 80 MMHG | SYSTOLIC BLOOD PRESSURE: 120 MMHG | HEART RATE: 80 BPM | TEMPERATURE: 98.2 F | WEIGHT: 275 LBS | OXYGEN SATURATION: 97 % | HEIGHT: 62 IN | BODY MASS INDEX: 50.61 KG/M2

## 2019-01-17 DIAGNOSIS — B96.89 ACUTE BACTERIAL SINUSITIS: Primary | ICD-10-CM

## 2019-01-17 DIAGNOSIS — J01.90 ACUTE BACTERIAL SINUSITIS: Primary | ICD-10-CM

## 2019-01-17 DIAGNOSIS — Z99.89 OSA ON CPAP: Primary | ICD-10-CM

## 2019-01-17 DIAGNOSIS — J30.89 OTHER ALLERGIC RHINITIS: ICD-10-CM

## 2019-01-17 DIAGNOSIS — G47.33 OSA ON CPAP: Primary | ICD-10-CM

## 2019-01-17 PROCEDURE — 99213 OFFICE O/P EST LOW 20 MIN: CPT | Performed by: INTERNAL MEDICINE

## 2019-01-17 PROCEDURE — 98969 PR NONPHYSICIAN ONLINE ASSESSMENT AND MANAGEMENT: CPT | Performed by: NURSE PRACTITIONER

## 2019-01-17 RX ORDER — AZITHROMYCIN 250 MG/1
TABLET, FILM COATED ORAL
Qty: 6 TABLET | Refills: 0 | Status: SHIPPED | OUTPATIENT
Start: 2019-01-17 | End: 2019-05-02 | Stop reason: ALTCHOICE

## 2019-01-17 ASSESSMENT — SLEEP AND FATIGUE QUESTIONNAIRES
HOW LIKELY ARE YOU TO NOD OFF OR FALL ASLEEP WHILE SITTING AND TALKING TO SOMEONE: 0
HOW LIKELY ARE YOU TO NOD OFF OR FALL ASLEEP IN A CAR, WHILE STOPPED FOR A FEW MINUTES IN TRAFFIC: 0
HOW LIKELY ARE YOU TO NOD OFF OR FALL ASLEEP WHILE SITTING QUIETLY AFTER LUNCH WITHOUT ALCOHOL: 0
ESS TOTAL SCORE: 5
HOW LIKELY ARE YOU TO NOD OFF OR FALL ASLEEP WHILE LYING DOWN TO REST IN THE AFTERNOON WHEN CIRCUMSTANCES PERMIT: 1
HOW LIKELY ARE YOU TO NOD OFF OR FALL ASLEEP WHILE SITTING INACTIVE IN A PUBLIC PLACE: 0
HOW LIKELY ARE YOU TO NOD OFF OR FALL ASLEEP WHEN YOU ARE A PASSENGER IN A CAR FOR AN HOUR WITHOUT A BREAK: 1
HOW LIKELY ARE YOU TO NOD OFF OR FALL ASLEEP WHILE SITTING AND READING: 2
HOW LIKELY ARE YOU TO NOD OFF OR FALL ASLEEP WHILE WATCHING TV: 1

## 2019-01-17 ASSESSMENT — LIFESTYLE VARIABLES
PACKS_PER_DAY: .5
SMOKING_YEARS: 15
SMOKING_STATUS: NO, I'M A FORMER SMOKER

## 2019-01-28 DIAGNOSIS — E78.49 OTHER HYPERLIPIDEMIA: ICD-10-CM

## 2019-01-28 RX ORDER — ATORVASTATIN CALCIUM 80 MG/1
80 TABLET, FILM COATED ORAL DAILY
Qty: 90 TABLET | Refills: 0 | Status: SHIPPED | OUTPATIENT
Start: 2019-01-28 | End: 2019-05-06 | Stop reason: SDUPTHER

## 2019-02-19 RX ORDER — OMEPRAZOLE 20 MG/1
20 CAPSULE, DELAYED RELEASE ORAL 2 TIMES DAILY
Qty: 180 CAPSULE | Refills: 1 | Status: SHIPPED | OUTPATIENT
Start: 2019-02-19 | End: 2020-02-11 | Stop reason: SDUPTHER

## 2019-02-27 ENCOUNTER — OFFICE VISIT (OUTPATIENT)
Dept: FAMILY MEDICINE CLINIC | Age: 44
End: 2019-02-27
Payer: COMMERCIAL

## 2019-02-27 VITALS
DIASTOLIC BLOOD PRESSURE: 77 MMHG | SYSTOLIC BLOOD PRESSURE: 124 MMHG | HEIGHT: 62 IN | WEIGHT: 277 LBS | BODY MASS INDEX: 50.97 KG/M2 | HEART RATE: 72 BPM

## 2019-02-27 DIAGNOSIS — Z00.00 WELL ADULT EXAM: Primary | ICD-10-CM

## 2019-02-27 DIAGNOSIS — F40.243 FLYING PHOBIA: ICD-10-CM

## 2019-02-27 DIAGNOSIS — Z13.6 SCREENING FOR HYPERTENSION: ICD-10-CM

## 2019-02-27 DIAGNOSIS — I10 ESSENTIAL HYPERTENSION: ICD-10-CM

## 2019-02-27 DIAGNOSIS — R87.612 LOW GRADE SQUAMOUS INTRAEPITH LESION ON CYTOLOGIC SMEAR CERVIX (LGSIL): ICD-10-CM

## 2019-02-27 LAB
ANION GAP SERPL CALCULATED.3IONS-SCNC: 18 MMOL/L (ref 3–16)
BUN BLDV-MCNC: 19 MG/DL (ref 7–20)
CALCIUM SERPL-MCNC: 9.9 MG/DL (ref 8.3–10.6)
CHLORIDE BLD-SCNC: 98 MMOL/L (ref 99–110)
CHOLESTEROL, TOTAL: 170 MG/DL (ref 0–199)
CO2: 23 MMOL/L (ref 21–32)
CREAT SERPL-MCNC: 0.7 MG/DL (ref 0.6–1.1)
GFR AFRICAN AMERICAN: >60
GFR NON-AFRICAN AMERICAN: >60
GLUCOSE BLD-MCNC: 133 MG/DL (ref 70–99)
HDLC SERPL-MCNC: 32 MG/DL (ref 40–60)
LDL CHOLESTEROL CALCULATED: 82 MG/DL
POTASSIUM SERPL-SCNC: 4.3 MMOL/L (ref 3.5–5.1)
SODIUM BLD-SCNC: 139 MMOL/L (ref 136–145)
TRIGL SERPL-MCNC: 278 MG/DL (ref 0–150)
VLDLC SERPL CALC-MCNC: 56 MG/DL

## 2019-02-27 PROCEDURE — 36415 COLL VENOUS BLD VENIPUNCTURE: CPT | Performed by: FAMILY MEDICINE

## 2019-02-27 PROCEDURE — 99396 PREV VISIT EST AGE 40-64: CPT | Performed by: FAMILY MEDICINE

## 2019-02-27 RX ORDER — DIAZEPAM 2 MG/1
TABLET ORAL
Qty: 8 TABLET | Refills: 0 | Status: SHIPPED | OUTPATIENT
Start: 2019-02-27 | End: 2021-04-12 | Stop reason: SDUPTHER

## 2019-02-27 RX ORDER — MECLIZINE HYDROCHLORIDE 25 MG/1
25 TABLET ORAL 3 TIMES DAILY PRN
Qty: 30 TABLET | Refills: 0 | Status: SHIPPED | OUTPATIENT
Start: 2019-02-27 | End: 2019-03-09

## 2019-02-27 ASSESSMENT — PATIENT HEALTH QUESTIONNAIRE - PHQ9
SUM OF ALL RESPONSES TO PHQ9 QUESTIONS 1 & 2: 0
SUM OF ALL RESPONSES TO PHQ QUESTIONS 1-9: 0
1. LITTLE INTEREST OR PLEASURE IN DOING THINGS: 0
SUM OF ALL RESPONSES TO PHQ QUESTIONS 1-9: 0
2. FEELING DOWN, DEPRESSED OR HOPELESS: 0

## 2019-04-17 DIAGNOSIS — Z13.6 SCREENING FOR HYPERTENSION: Primary | ICD-10-CM

## 2019-04-17 DIAGNOSIS — I10 ESSENTIAL HYPERTENSION: ICD-10-CM

## 2019-04-17 DIAGNOSIS — E78.49 OTHER HYPERLIPIDEMIA: ICD-10-CM

## 2019-04-17 RX ORDER — FENOFIBRATE 145 MG/1
145 TABLET, COATED ORAL DAILY
Qty: 90 TABLET | Refills: 1 | Status: SHIPPED | OUTPATIENT
Start: 2019-04-17 | End: 2019-10-14 | Stop reason: SDUPTHER

## 2019-04-17 RX ORDER — HYDROCHLOROTHIAZIDE 25 MG/1
25 TABLET ORAL DAILY
Qty: 90 TABLET | Refills: 0 | Status: SHIPPED | OUTPATIENT
Start: 2019-04-17 | End: 2019-07-15 | Stop reason: SDUPTHER

## 2019-05-02 ENCOUNTER — OFFICE VISIT (OUTPATIENT)
Dept: ENDOCRINOLOGY | Age: 44
End: 2019-05-02
Payer: COMMERCIAL

## 2019-05-02 VITALS
HEART RATE: 85 BPM | WEIGHT: 277 LBS | SYSTOLIC BLOOD PRESSURE: 130 MMHG | BODY MASS INDEX: 50.97 KG/M2 | DIASTOLIC BLOOD PRESSURE: 70 MMHG | HEIGHT: 62 IN | OXYGEN SATURATION: 99 % | RESPIRATION RATE: 16 BRPM

## 2019-05-02 DIAGNOSIS — Z79.4 INSULIN DEPENDENT TYPE 2 DIABETES MELLITUS, CONTROLLED (HCC): Primary | ICD-10-CM

## 2019-05-02 DIAGNOSIS — E11.9 INSULIN DEPENDENT TYPE 2 DIABETES MELLITUS, CONTROLLED (HCC): Primary | ICD-10-CM

## 2019-05-02 LAB — HBA1C MFR BLD: 6.9 %

## 2019-05-02 PROCEDURE — 99214 OFFICE O/P EST MOD 30 MIN: CPT | Performed by: INTERNAL MEDICINE

## 2019-05-02 PROCEDURE — 83036 HEMOGLOBIN GLYCOSYLATED A1C: CPT | Performed by: INTERNAL MEDICINE

## 2019-05-02 NOTE — PROGRESS NOTES
Seen as f/u patient for diabetes    Interim:   Fair control    Diagnosed with Type 2 diabetes mellitus 4-5 yrs ago  Known diabetic complications: None    Current diabetic medications   U-500 pen 110/110   SSI 5 for 50 >150     Metformin ER  500mg BID  jardiance 25mg    Previous  lantus 110 units HS  Novolog 25 TID + SSI 5 for 50>150  Usually takes 35-40  januvia 100mg  Was on victoza in the past for 2 months  Bydureon    Insulin started 3/13    Intolerance to diabetes medications: yes - Metformin     Last A1c 6.9%<-----7%<------6.6%<------6.6%<----- 6.8%<-----6.3 on 3/17<----6.3 on 12/16<----6.1 on 3/15  <---- 8.5 On 12/13<---- 9.6<---- 8.9 on 5/13<---10.8<---10.9    Prior visit with dietician: Yes   Current diet: on average, 3 meals per day 45gm CHO  Current exercise: No exercise for one month  Current monitoring regimen: home blood tests -1/week    Has brought blood glucose log/meter: yes  Home blood sugar records: 132-288  Any episodes of hypoglycemia? occ    No Hx of CAD , PVD, CVA    Hyperlipidemia: pravastatin 40mg fenofibrate 145   on 10/14-switched to lipitor 80mg LDL 73 on 2/16  LDL 96  HDL 29 on 3/18     LDL 82 on 2/19  Tolerating , no aches  Last eye exam: 11/18  Last foot exam:5/19  Last microalbumin to creatinine ratio: M/C nl on 5/18  ,   35 on 5/13 34 on 9/13  nl on 3/12  HTN: Stable, tolerating Lisinopril 40mg HCTZ 25mg    4/14  CGMS Report   CGMS insertion date 4/29/14 CGMS data collection was performed on 4/29/14 - 5/3/14. Patient provided written diet, activities and medical management for specified period. MAD 3.6 %   Average reading 252 mg/dL   Std Dev 49 mg/dL   % of time <70 mg/dL 0 %   % of time >140 mg/dL 100 %   Number of hypoglycemia episodes noted: 0   CGMS showed daytime and nocturnal hyperglycemia   Impression:   Daytime and nocturnal hyperglycemia . No documentation of U500 injections.  See scanned i-Pro download   Recommendation:   Advised patient to reduce carbohydrate intake to no more than 30 grams with large meals. Add Invokana or [place]. Increase Humulin U500 by 5-10% or change Humulin U500 to be delivered via V-Go 20 and IC of 1:6    Review of Systems  Reviewed and scanned     Objective:        /70 (Site: Left Upper Arm, Position: Sitting, Cuff Size: Medium Adult)   Pulse 85   Resp 16   Ht 5' 2\" (1.575 m)   Wt 277 lb (125.6 kg)   LMP  (LMP Unknown)   SpO2 99%   Breastfeeding? No   BMI 50.66 kg/m²   Wt Readings from Last 3 Encounters:   05/02/19 277 lb (125.6 kg)   02/27/19 277 lb (125.6 kg)   01/17/19 275 lb (124.7 kg)     Constitutional: Well-developed, appears stated age, cooperative, in no acute distress  H/E/N/M/T:atraumatic, normocephalic, external ears, nose, lips normal without lesions  Eyes: Lids, lashes, conjunctivae and sclerae normal, No proptosis, no redness  Neck: supple, symmetrical, no swelling  Skin: No obvious rashes or lesions present.   Skin and hair texture normal  Psychiatric: Judgement and Insight:  judgement and insight appear normal  Neuro: Normal without focal findings, speech is normal normal, speech is spontaneous  Chest: No labored breathing, no chest deformity, no stridor  Musculoskeletal: No joint deformity, swelling      5/19  Skeletal foot exam is normal, no skin lesions, toenails are normal, 10 g monofilament is 10/10, Dry feet    Lab Reviewed   No components found for: CHLPL  Lab Results   Component Value Date    TRIG 278 (H) 02/27/2019    TRIG 342 (H) 03/06/2018    TRIG 139 03/10/2017     Lab Results   Component Value Date    HDL 32 (L) 02/27/2019    HDL 29 (L) 03/06/2018    HDL 31 (L) 03/10/2017     Lab Results   Component Value Date    LDLCALC 82 02/27/2019    LDLCALC see below 03/06/2018    LDLCALC 78 03/10/2017     Lab Results   Component Value Date    LABVLDL 56 02/27/2019    LABVLDL see below 03/06/2018    LABVLDL 28 02/10/2015     Lab Results   Component Value Date    LABA1C 7.0 01/03/2019       Assessment:     Kate Costa Mu Orozco is a 37 y.o. female with :    1.T2DM:Longstanding, insulin resistant,well controlled. Given A1c and insulin requirements ,  switched to U-500 insulin. Added GLP agonist, did not help. Added SGLT-2 inhibitor, blood glucose readings have improved, A1c at goal. Continue same  2. HTN;At goal  3. HLD:LDL at goal, on lipitor/ fenofibrate  4. Obesity:Recommend aggressive life style and discussed bariatric option  5. SUNDAY: using Cpap machine more now    Plan:       U-500  110 units BID   SSI 5 for 50>150    jardiance 25mg    Metformin ER 500mg BID   Discussed in detail use of U-500. Discussed side effects of hypoglcyemia   Advise to check blood sugar 2 times a day   Patient to send blood sugar log for titration. Advise to exercise regularly. Advise to low simple carbohydrate and protein with each  meal diet. Diabetes Care: recommend yearly eye exam, foot exam and urine microalbumin to   creatinine ratio.      -Hyperlipidemia, LDL goal is <100 mg/dl   -Hypertension:Continue current  -Daily ASA:Not indicated  -Smoking status:Non smoker

## 2019-05-06 DIAGNOSIS — E11.9 INSULIN DEPENDENT TYPE 2 DIABETES MELLITUS, CONTROLLED (HCC): ICD-10-CM

## 2019-05-06 DIAGNOSIS — Z79.4 INSULIN DEPENDENT TYPE 2 DIABETES MELLITUS, CONTROLLED (HCC): ICD-10-CM

## 2019-05-06 LAB
CREATININE URINE: 193.9 MG/DL (ref 28–259)
MICROALBUMIN UR-MCNC: 1.3 MG/DL
MICROALBUMIN/CREAT UR-RTO: 6.7 MG/G (ref 0–30)

## 2019-07-01 DIAGNOSIS — E11.9 INSULIN DEPENDENT TYPE 2 DIABETES MELLITUS, CONTROLLED (HCC): ICD-10-CM

## 2019-07-01 DIAGNOSIS — Z79.4 INSULIN DEPENDENT TYPE 2 DIABETES MELLITUS, CONTROLLED (HCC): ICD-10-CM

## 2019-07-15 DIAGNOSIS — I10 ESSENTIAL HYPERTENSION: ICD-10-CM

## 2019-07-15 DIAGNOSIS — E11.69 DM TYPE 2 WITH DIABETIC DYSLIPIDEMIA (HCC): ICD-10-CM

## 2019-07-15 DIAGNOSIS — E78.5 DM TYPE 2 WITH DIABETIC DYSLIPIDEMIA (HCC): ICD-10-CM

## 2019-07-15 RX ORDER — HYDROCHLOROTHIAZIDE 25 MG/1
25 TABLET ORAL DAILY
Qty: 90 TABLET | Refills: 0 | Status: SHIPPED | OUTPATIENT
Start: 2019-07-15 | End: 2019-10-14 | Stop reason: SDUPTHER

## 2019-07-16 RX ORDER — ASPIRIN 81 MG/1
81 TABLET ORAL DAILY
Qty: 100 TABLET | Refills: 3 | Status: SHIPPED | OUTPATIENT
Start: 2019-07-16 | End: 2020-07-14 | Stop reason: SDUPTHER

## 2019-08-16 ENCOUNTER — OFFICE VISIT (OUTPATIENT)
Dept: FAMILY MEDICINE CLINIC | Age: 44
End: 2019-08-16
Payer: COMMERCIAL

## 2019-08-16 VITALS
DIASTOLIC BLOOD PRESSURE: 76 MMHG | BODY MASS INDEX: 51.14 KG/M2 | WEIGHT: 279.6 LBS | RESPIRATION RATE: 18 BRPM | HEART RATE: 85 BPM | SYSTOLIC BLOOD PRESSURE: 112 MMHG

## 2019-08-16 DIAGNOSIS — K21.9 GASTROESOPHAGEAL REFLUX DISEASE WITHOUT ESOPHAGITIS: ICD-10-CM

## 2019-08-16 DIAGNOSIS — E11.69 DM TYPE 2 WITH DIABETIC DYSLIPIDEMIA (HCC): Primary | ICD-10-CM

## 2019-08-16 DIAGNOSIS — E83.42 HYPOMAGNESEMIA: ICD-10-CM

## 2019-08-16 DIAGNOSIS — G47.33 OSA ON CPAP: ICD-10-CM

## 2019-08-16 DIAGNOSIS — I10 ESSENTIAL HYPERTENSION: ICD-10-CM

## 2019-08-16 DIAGNOSIS — E78.5 DM TYPE 2 WITH DIABETIC DYSLIPIDEMIA (HCC): Primary | ICD-10-CM

## 2019-08-16 DIAGNOSIS — Z99.89 OSA ON CPAP: ICD-10-CM

## 2019-08-16 DIAGNOSIS — E78.49 OTHER HYPERLIPIDEMIA: ICD-10-CM

## 2019-08-16 LAB
ALBUMIN SERPL-MCNC: 4.7 G/DL (ref 3.4–5)
ALP BLD-CCNC: 46 U/L (ref 40–129)
ALT SERPL-CCNC: 20 U/L (ref 10–40)
ANION GAP SERPL CALCULATED.3IONS-SCNC: 16 MMOL/L (ref 3–16)
AST SERPL-CCNC: 16 U/L (ref 15–37)
BILIRUB SERPL-MCNC: <0.2 MG/DL (ref 0–1)
BILIRUBIN DIRECT: <0.2 MG/DL (ref 0–0.3)
BILIRUBIN, INDIRECT: NORMAL MG/DL (ref 0–1)
BUN BLDV-MCNC: 18 MG/DL (ref 7–20)
CALCIUM SERPL-MCNC: 10.2 MG/DL (ref 8.3–10.6)
CHLORIDE BLD-SCNC: 104 MMOL/L (ref 99–110)
CO2: 23 MMOL/L (ref 21–32)
CREAT SERPL-MCNC: 0.7 MG/DL (ref 0.6–1.1)
GFR AFRICAN AMERICAN: >60
GFR NON-AFRICAN AMERICAN: >60
GLUCOSE BLD-MCNC: 173 MG/DL (ref 70–99)
HBA1C MFR BLD: 7.3 %
MAGNESIUM: 2.1 MG/DL (ref 1.8–2.4)
POTASSIUM SERPL-SCNC: 4.4 MMOL/L (ref 3.5–5.1)
SODIUM BLD-SCNC: 143 MMOL/L (ref 136–145)
TOTAL PROTEIN: 7.2 G/DL (ref 6.4–8.2)

## 2019-08-16 PROCEDURE — 83036 HEMOGLOBIN GLYCOSYLATED A1C: CPT | Performed by: FAMILY MEDICINE

## 2019-08-16 PROCEDURE — 99214 OFFICE O/P EST MOD 30 MIN: CPT | Performed by: FAMILY MEDICINE

## 2019-08-16 PROCEDURE — 36415 COLL VENOUS BLD VENIPUNCTURE: CPT | Performed by: FAMILY MEDICINE

## 2019-08-16 NOTE — PROGRESS NOTES
HPI     CC:  DM2, HTN, HLD, SUNDAY, GERD, hypomagnesium    Pt here for 6 month management of chronic medical conditions, to receive blood work and medication management. Treatment Adherence:   Medication compliance:  compliant all of the time  Diet compliance: eating 45 g carbs per meal about 70% of the time  Weight trend: up 3 lbs  Current exercise: none  Barriers: none    Diabetes Mellitus Type 2: Current symptoms/problems include none. Taking U500 insulin managed by dr Roslyn Lea. Takes metformin 500mg XR BID and jardiance     Home blood sugar records: 120--140s   Any episodes of hypoglycemia? none  Eye exam current (within one year): yes, but we don't have report. Tobacco history: She  reports that she quit smoking about 8 years ago. Her smoking use included cigarettes. She has a 18.00 pack-year smoking history. She has never used smokeless tobacco.   Daily Aspirin? Yes  Known diabetic complications: none    Hypertension:  Home blood pressure monitoring: No.  She is adherent to a low sodium diet. Patient denies chest pain, shortness of breath, headache, lightheadedness, blurred vision, peripheral edema, palpitations, dry cough and fatigue. Antihypertensive medication side effects: no medication side effects noted. Use of agents associated with hypertension: none. Hyperlipidemia:  No myalgias or stomach upset on tricor and lipitor. Lab Results   Component Value Date    LABA1C 6.9 05/02/2019    LABA1C 7.0 01/03/2019    LABA1C 6.6 08/23/2018     Lab Results   Component Value Date    LABMICR 1.30 05/06/2019    CREATININE 0.7 02/27/2019     Lab Results   Component Value Date    ALT 20 10/26/2018    AST 17 10/26/2018     Lab Results   Component Value Date    CHOL 170 02/27/2019    TRIG 278 (H) 02/27/2019    HDL 32 (L) 02/27/2019    LDLCALC 82 02/27/2019    LDLDIRECT 96 03/06/2018          Sleep Apnea:  Current treatment: CPAP. Residual symptoms include: none.   Managed by dr Lennox Yi annually in

## 2019-08-20 ENCOUNTER — TELEPHONE (OUTPATIENT)
Dept: FAMILY MEDICINE CLINIC | Age: 44
End: 2019-08-20

## 2019-08-20 RX ORDER — LISINOPRIL 40 MG/1
40 TABLET ORAL DAILY
Qty: 90 TABLET | Refills: 1 | Status: SHIPPED | OUTPATIENT
Start: 2019-08-20 | End: 2020-08-03 | Stop reason: SDUPTHER

## 2019-09-30 RX ORDER — METFORMIN HYDROCHLORIDE 500 MG/1
TABLET, EXTENDED RELEASE ORAL
Qty: 180 TABLET | Refills: 6 | Status: SHIPPED | OUTPATIENT
Start: 2019-09-30 | End: 2021-07-12

## 2019-10-07 DIAGNOSIS — Z79.4 INSULIN DEPENDENT TYPE 2 DIABETES MELLITUS, CONTROLLED (HCC): ICD-10-CM

## 2019-10-07 DIAGNOSIS — E11.9 INSULIN DEPENDENT TYPE 2 DIABETES MELLITUS, CONTROLLED (HCC): ICD-10-CM

## 2019-10-10 ENCOUNTER — OFFICE VISIT (OUTPATIENT)
Dept: ENDOCRINOLOGY | Age: 44
End: 2019-10-10
Payer: COMMERCIAL

## 2019-10-10 VITALS
OXYGEN SATURATION: 98 % | SYSTOLIC BLOOD PRESSURE: 122 MMHG | WEIGHT: 279 LBS | DIASTOLIC BLOOD PRESSURE: 78 MMHG | TEMPERATURE: 98.6 F | HEART RATE: 92 BPM | BODY MASS INDEX: 51.03 KG/M2

## 2019-10-10 DIAGNOSIS — E78.5 DM TYPE 2 WITH DIABETIC DYSLIPIDEMIA (HCC): Primary | ICD-10-CM

## 2019-10-10 DIAGNOSIS — Z79.4 INSULIN DEPENDENT TYPE 2 DIABETES MELLITUS, CONTROLLED (HCC): ICD-10-CM

## 2019-10-10 DIAGNOSIS — E11.69 DM TYPE 2 WITH DIABETIC DYSLIPIDEMIA (HCC): Primary | ICD-10-CM

## 2019-10-10 DIAGNOSIS — E11.9 INSULIN DEPENDENT TYPE 2 DIABETES MELLITUS, CONTROLLED (HCC): ICD-10-CM

## 2019-10-10 LAB — HBA1C MFR BLD: 7.5 %

## 2019-10-10 PROCEDURE — 99214 OFFICE O/P EST MOD 30 MIN: CPT | Performed by: INTERNAL MEDICINE

## 2019-10-10 PROCEDURE — 83036 HEMOGLOBIN GLYCOSYLATED A1C: CPT | Performed by: INTERNAL MEDICINE

## 2019-10-14 DIAGNOSIS — I10 ESSENTIAL HYPERTENSION: ICD-10-CM

## 2019-10-14 DIAGNOSIS — E78.49 OTHER HYPERLIPIDEMIA: ICD-10-CM

## 2019-10-14 RX ORDER — HYDROCHLOROTHIAZIDE 25 MG/1
25 TABLET ORAL DAILY
Qty: 90 TABLET | Refills: 0 | Status: SHIPPED | OUTPATIENT
Start: 2019-10-14 | End: 2020-01-13 | Stop reason: SDUPTHER

## 2019-10-14 RX ORDER — FENOFIBRATE 145 MG/1
145 TABLET, COATED ORAL DAILY
Qty: 90 TABLET | Refills: 1 | Status: SHIPPED | OUTPATIENT
Start: 2019-10-14 | End: 2020-04-15 | Stop reason: SDUPTHER

## 2019-11-04 DIAGNOSIS — E78.49 OTHER HYPERLIPIDEMIA: ICD-10-CM

## 2019-11-04 RX ORDER — ATORVASTATIN CALCIUM 80 MG/1
80 TABLET, FILM COATED ORAL DAILY
Qty: 90 TABLET | Refills: 1 | Status: SHIPPED | OUTPATIENT
Start: 2019-11-04 | End: 2020-05-06 | Stop reason: SDUPTHER

## 2019-11-06 ENCOUNTER — OFFICE VISIT (OUTPATIENT)
Dept: ENDOCRINOLOGY | Age: 44
End: 2019-11-06
Payer: COMMERCIAL

## 2019-11-06 DIAGNOSIS — E78.5 DM TYPE 2 WITH DIABETIC DYSLIPIDEMIA (HCC): Primary | ICD-10-CM

## 2019-11-06 DIAGNOSIS — E11.69 DM TYPE 2 WITH DIABETIC DYSLIPIDEMIA (HCC): Primary | ICD-10-CM

## 2019-11-06 PROCEDURE — 97802 MEDICAL NUTRITION INDIV IN: CPT | Performed by: DIETITIAN, REGISTERED

## 2019-11-26 ENCOUNTER — OFFICE VISIT (OUTPATIENT)
Dept: FAMILY MEDICINE CLINIC | Age: 44
End: 2019-11-26
Payer: COMMERCIAL

## 2019-11-26 ENCOUNTER — E-VISIT (OUTPATIENT)
Dept: PRIMARY CARE CLINIC | Age: 44
End: 2019-11-26

## 2019-11-26 VITALS
DIASTOLIC BLOOD PRESSURE: 68 MMHG | SYSTOLIC BLOOD PRESSURE: 116 MMHG | WEIGHT: 281 LBS | TEMPERATURE: 98.7 F | OXYGEN SATURATION: 97 % | BODY MASS INDEX: 51.71 KG/M2 | HEART RATE: 93 BPM | HEIGHT: 62 IN

## 2019-11-26 DIAGNOSIS — J02.0 STREP THROAT: ICD-10-CM

## 2019-11-26 DIAGNOSIS — J02.9 SORE THROAT: Primary | ICD-10-CM

## 2019-11-26 LAB — STREPTOCOCCUS A RNA: POSITIVE

## 2019-11-26 PROCEDURE — 99213 OFFICE O/P EST LOW 20 MIN: CPT | Performed by: NURSE PRACTITIONER

## 2019-11-26 PROCEDURE — 87651 STREP A DNA AMP PROBE: CPT | Performed by: NURSE PRACTITIONER

## 2019-11-26 RX ORDER — AMOXICILLIN 500 MG/1
500 CAPSULE ORAL 2 TIMES DAILY
Qty: 20 CAPSULE | Refills: 0 | Status: SHIPPED | OUTPATIENT
Start: 2019-11-26 | End: 2019-12-06

## 2019-11-26 ASSESSMENT — LIFESTYLE VARIABLES
PACKS_PER_DAY: .5
SMOKING_YEARS: 15
SMOKING_STATUS: NO, I'M A FORMER SMOKER

## 2019-12-10 ENCOUNTER — TELEPHONE (OUTPATIENT)
Dept: FAMILY MEDICINE CLINIC | Age: 44
End: 2019-12-10

## 2019-12-11 ENCOUNTER — NURSE ONLY (OUTPATIENT)
Dept: FAMILY MEDICINE CLINIC | Age: 44
End: 2019-12-11
Payer: COMMERCIAL

## 2019-12-11 DIAGNOSIS — J02.0 STREP THROAT: Primary | ICD-10-CM

## 2019-12-11 LAB — STREPTOCOCCUS A RNA: NEGATIVE

## 2019-12-11 PROCEDURE — 87651 STREP A DNA AMP PROBE: CPT | Performed by: NURSE PRACTITIONER

## 2019-12-13 LAB — THROAT CULTURE: NORMAL

## 2020-01-09 ENCOUNTER — TELEPHONE (OUTPATIENT)
Dept: PHARMACY | Facility: CLINIC | Age: 45
End: 2020-01-09

## 2020-01-09 NOTE — TELEPHONE ENCOUNTER
Called patient to schedule pharmacist appointment to discuss medications for Diabetes Management Program.     No answer. Left VM on home/cell TAD: Please call back at [phone] Option #7 to retrieve the above message. Mailed letter    Nina Armenta CPhT.   Pharmacy [facility] [address]  Department, toll free: [phone], option 7

## 2020-01-15 NOTE — TELEPHONE ENCOUNTER
Called patient to schedule yearly pharmacist appointment to discuss medications for Diabetes Management Program.     Spoke to patient and appointment scheduled for 01/24/20 at 730A and patient gave consent to remain in the DM Program for 2020     [address] [facility], toll free: [phone], option 7

## 2020-01-22 NOTE — TELEPHONE ENCOUNTER
[address] [place] [address] Employee Diabetes Program  =================================================================  Duane Bartholomew is a 53 y.o. female enrolled in the [address] [place] [address] Employee Diabetes Program. Patient provided Roland Braswell with verbal consent to remain in the program for this year.      Medications:  Medication Sig    magnesium oxide (MAG-OX) 400 (241.3 Mg) MG TABS tablet Take 1 tablet by mouth 2 times daily    empagliflozin (JARDIANCE) 25 MG tablet Take 25 mg by mouth daily  - appears adherent based on fill history    hydrochlorothiazide (HYDRODIURIL) 25 MG tablet Take 1 tablet by mouth daily  - appears adherent based on fill history    atorvastatin (LIPITOR) 80 MG tablet Take 1 tablet by mouth daily  - appears adherent based on fill history    fenofibrate (TRICOR) 145 MG tablet Take 1 tablet by mouth daily    insulin regular human (HUMULIN R U-500 KWIKPEN) 500 UNIT/ML SOPN concentrated injection pen INJECT 110 UNITS BEFORE BREAKFAST AND SUPPER  - filling regularly    metFORMIN (GLUCOPHAGE-XR) 500 MG extended release tablet TAKE 1 TABLET BY MOUTH 2 TIMES A DAY  - appears adherent based on fill history    lisinopril (PRINIVIL;ZESTRIL) 40 MG tablet Take 1 tablet by mouth daily  - filled 2/26/19, 8/22/19 x 90 ds  - pt states she takes full tablet daily, denies missed doses    aspirin 81 MG EC tablet Take 1 tablet by mouth daily  - has Rx    Insulin Pen Needle 32G X 4 MM MISC 1 each by Does not apply route 2 times daily    omeprazole (PRILOSEC) 20 MG delayed release capsule Take 1 capsule by mouth 2 times daily    AGAMATRIX PRESTO TEST strip USE FOUR TIMES A DAY AS DIRECTED  - not filled in [facility], no Rx  - pt states using supply from previous fill - only testing once daily right now    celecoxib (CELEBREX) 200 MG capsule Take 1 capsule by mouth daily  - no longer taking    AGAMATRIX ULTRA-THIN LANCETS MISC USE AS DIRECTED 4 TIMES DAILY  - not filled in [facility], no Rx  - patient sates Systolic Blood Pressure: 118 mmHg      Is BP treated: Yes      HDL Cholesterol: 32 mg/dL      Total Cholesterol: 170 mg/dL     Lab Results   Component Value Date    CREATININE 0.7 2019     CrCl cannot be calculated (Patient's most recent lab result is older than the maximum 120 days allowed. ). Component      Latest Ref Rng & Units 2019 2019 10/26/2018           3:57 PM  9:37 AM  3:49 PM   GFR Non-      >60 >60 >60 >60       Immunizations:  Immunization History   Administered Date(s) Administered    Influenza Vaccine, unspecified formulation 10/10/2016    Influenza Virus Vaccine 2017, 2018, 2018, 2019    Pneumococcal Polysaccharide (Kqypuppka51) 2016    Tdap (Boostrix, Adacel) 2012      Social History:  Social History     Tobacco Use    Smoking status: Former Smoker     Packs/day: 1.00     Years: 18.00     Pack years: 18.00     Types: Cigarettes     Last attempt to quit: 2010     Years since quittin.3    Smokeless tobacco: Never Used   Substance Use Topics    Alcohol use: No     ASSESSMENT:  Initial Program Requirements (Y indicates has completed for the year, N indicates needs to be completed by 2020): No - OV with provider for DM (1st)  No - A1c (1st)     Ongoing Program Requirements (Y indicates has completed for the year, N indicates needs to be completed by 2020): No - OV with provider for DM (2nd)  Yes - ACC/diabetes educator visit (will need if A1c becomes over 8%)  No - A1c (2nd)  No - Lipid panel  No - Urine microalbumin  Yes - Pneumococcal vaccination: UTD  No - Influenza vaccination for 2020  Yes - Medication adherence over 70% - n/a on insulin  Yes - On statin or contraindication(s) atorvastatin  Yes - On ACEi/ARB or contraindication(s) lisinopril     Formulary Medication Review:  Non-formulary or medications with cost-effective alternatives: none identified.      Current medications eligible for copay f/u if any issues in between appointments  - Follow up: PCP/specialist for identified gaps or as scheduled below  - Upcoming appointments:   Future Appointments   Date Time Provider Jean-Paul Cherise   1/24/2020  7:30 AM SCHEDULE, [facility] None   1/30/2020  3:00 PM Radha Younger MD [facility]     Dali Castaneda, PharmD, R [facility] Pharmacist  Dept: [phone] (toll free [phone], option 7)     For Pharmacy Admin Tracking Only    PHSO: Yes  Total # of Interventions Recommended: 1  - Updated Order #: 1 Updated Order Reason(s):  Other  Recommended intervention potential cost savings: 1  Total Interventions Accepted: 1  Time Spent (min): 30

## 2020-01-24 ENCOUNTER — SCHEDULED TELEPHONE ENCOUNTER (OUTPATIENT)
Dept: PHARMACY | Facility: CLINIC | Age: 45
End: 2020-01-24

## 2020-01-30 ENCOUNTER — OFFICE VISIT (OUTPATIENT)
Dept: ENDOCRINOLOGY | Age: 45
End: 2020-01-30
Payer: COMMERCIAL

## 2020-01-30 VITALS
BODY MASS INDEX: 50.97 KG/M2 | DIASTOLIC BLOOD PRESSURE: 60 MMHG | SYSTOLIC BLOOD PRESSURE: 118 MMHG | OXYGEN SATURATION: 95 % | HEART RATE: 87 BPM | RESPIRATION RATE: 16 BRPM | HEIGHT: 62 IN | WEIGHT: 277 LBS

## 2020-01-30 LAB — HBA1C MFR BLD: 7.6 %

## 2020-01-30 PROCEDURE — 83036 HEMOGLOBIN GLYCOSYLATED A1C: CPT | Performed by: INTERNAL MEDICINE

## 2020-01-30 PROCEDURE — 99214 OFFICE O/P EST MOD 30 MIN: CPT | Performed by: INTERNAL MEDICINE

## 2020-01-30 NOTE — PROGRESS NOTES
Seen as f/u patient for diabetes    Interim:   Fair control    Diagnosed with Type 2 diabetes mellitus 4-5 yrs ago  Known diabetic complications: None    Current diabetic medications   U-500 pen 115/110   SSI 5 for 50 >150     Metformin ER  500mg BID  jardiance 25mg    Previous  lantus 110 units HS  Novolog 25 TID + SSI 5 for 50>150  Usually takes 35-40  januvia 100mg  Was on victoza in the past for 2 months  Bydureon    Insulin started 3/13    Intolerance to diabetes medications: yes - Metformin     Last A1c 7.5%<------ 6.9%<-----7%<------6.6%<------6.6%<----- 6.8%<-----6.3 on 3/17<----6.3 on 12/16<----6.1 on 3/15  <---- 8.5 On 12/13<---- 9.6<---- 8.9 on 5/13<---10.8<---10.9    Prior visit with dietician: Yes   Current diet: on average, 3 meals per day 45gm CHO  Current exercise: No exercise for one month  Current monitoring regimen: home blood tests occ , mainly in evening    Has brought blood glucose log/meter:yes  Home blood sugar records: 116-245  Any episodes of hypoglycemia? occ    No Hx of CAD , PVD, CVA    Hyperlipidemia: pravastatin 40mg fenofibrate 145   on 10/14-switched to lipitor 80mg LDL 73 on 2/16  LDL 96  HDL 29 on 3/18     LDL 82 on 2/19  Tolerating , no aches  Last eye exam: 11/18  Last foot exam:5/19  Last microalbumin to creatinine ratio: M/C nl on 5/19  ,   35 on 5/13 34 on 9/13  nl on 3/12  HTN: Stable, tolerating Lisinopril 40mg HCTZ 25mg    4/14  CGMS Report   CGMS insertion date 4/29/14 CGMS data collection was performed on 4/29/14 - 5/3/14. Patient provided written diet, activities and medical management for specified period. MAD 3.6 %   Average reading 252 mg/dL   Std Dev 49 mg/dL   % of time <70 mg/dL 0 %   % of time >140 mg/dL 100 %   Number of hypoglycemia episodes noted: 0   CGMS showed daytime and nocturnal hyperglycemia   Impression:   Daytime and nocturnal hyperglycemia . No documentation of U500 injections.  See scanned i-Pro download   Recommendation:   Advised

## 2020-02-11 ENCOUNTER — E-VISIT (OUTPATIENT)
Dept: FAMILY MEDICINE CLINIC | Age: 45
End: 2020-02-11
Payer: COMMERCIAL

## 2020-02-11 PROCEDURE — 99421 OL DIG E/M SVC 5-10 MIN: CPT | Performed by: NURSE PRACTITIONER

## 2020-02-11 RX ORDER — AZITHROMYCIN 250 MG/1
250 TABLET, FILM COATED ORAL SEE ADMIN INSTRUCTIONS
Qty: 6 TABLET | Refills: 0 | Status: SHIPPED | OUTPATIENT
Start: 2020-02-11 | End: 2020-02-16

## 2020-02-11 RX ORDER — OMEPRAZOLE 20 MG/1
20 CAPSULE, DELAYED RELEASE ORAL 2 TIMES DAILY
Qty: 180 CAPSULE | Refills: 1 | Status: SHIPPED | OUTPATIENT
Start: 2020-02-11 | End: 2020-12-30 | Stop reason: SDUPTHER

## 2020-02-11 ASSESSMENT — LIFESTYLE VARIABLES: SMOKING_STATUS: NO, I'VE NEVER SMOKED

## 2020-03-02 ENCOUNTER — EMPLOYEE WELLNESS (OUTPATIENT)
Dept: OTHER | Age: 45
End: 2020-03-02

## 2020-03-02 LAB
CHOLESTEROL, TOTAL: 162 MG/DL (ref 0–199)
GLUCOSE BLD-MCNC: 174 MG/DL (ref 70–99)
HDLC SERPL-MCNC: 29 MG/DL (ref 40–60)
LDL CHOLESTEROL CALCULATED: 76 MG/DL
TRIGL SERPL-MCNC: 287 MG/DL (ref 0–150)

## 2020-03-03 LAB
ESTIMATED AVERAGE GLUCOSE: 177.2 MG/DL
HBA1C MFR BLD: 7.8 %

## 2020-03-05 LAB
3-OH-COTININE: 11 NG/ML
COTININE: <2 NG/ML
NICOTINE: <2 NG/ML

## 2020-04-15 RX ORDER — HYDROCHLOROTHIAZIDE 25 MG/1
25 TABLET ORAL DAILY
Qty: 30 TABLET | Refills: 0 | Status: SHIPPED | OUTPATIENT
Start: 2020-04-15 | End: 2020-05-06

## 2020-04-15 RX ORDER — FENOFIBRATE 145 MG/1
145 TABLET, COATED ORAL DAILY
Qty: 30 TABLET | Refills: 0 | Status: SHIPPED | OUTPATIENT
Start: 2020-04-15 | End: 2020-05-06

## 2020-05-04 RX ORDER — ATORVASTATIN CALCIUM 80 MG/1
80 TABLET, FILM COATED ORAL DAILY
Qty: 90 TABLET | Refills: 1 | OUTPATIENT
Start: 2020-05-04

## 2020-05-06 ENCOUNTER — VIRTUAL VISIT (OUTPATIENT)
Dept: FAMILY MEDICINE CLINIC | Age: 45
End: 2020-05-06
Payer: COMMERCIAL

## 2020-05-06 PROCEDURE — 99214 OFFICE O/P EST MOD 30 MIN: CPT | Performed by: NURSE PRACTITIONER

## 2020-05-06 RX ORDER — FENOFIBRATE 145 MG/1
145 TABLET, COATED ORAL DAILY
Qty: 90 TABLET | Refills: 0 | Status: SHIPPED | OUTPATIENT
Start: 2020-05-06 | End: 2020-08-13 | Stop reason: SDUPTHER

## 2020-05-06 RX ORDER — ATORVASTATIN CALCIUM 80 MG/1
80 TABLET, FILM COATED ORAL DAILY
Qty: 90 TABLET | Refills: 1 | Status: SHIPPED | OUTPATIENT
Start: 2020-05-06 | End: 2020-08-03

## 2020-05-06 RX ORDER — HYDROCHLOROTHIAZIDE 25 MG/1
25 TABLET ORAL DAILY
Qty: 90 TABLET | Refills: 0 | Status: SHIPPED | OUTPATIENT
Start: 2020-05-06 | End: 2020-08-13 | Stop reason: SDUPTHER

## 2020-05-06 NOTE — PROGRESS NOTES
2020    TELEHEALTH EVALUATION -- Audio/Visual (During OQPMG-68 public health emergency)    HPI:    Tomi Che (:  1975) has requested an audio/video evaluation for the following concern(s):    DM, HTN, HLD, GERD    Treatment Adherence:   Medication compliance:  compliant all of the time  Diet compliance:  compliant most of the time  Weight trend: stable  Current exercise: no regular exercise  Barriers: none    Diabetes Mellitus Type 2: Current symptoms/problems include none. Patient follows with Dr. Ashwini James. Recent A1C on 3/2 was 7.8. She states he increased her insulin after this result. Home blood sugar records: fasting range: 150-190  Any episodes of hypoglycemia? no  Eye exam current (within one year): yes  Tobacco history: She  reports that she quit smoking about 9 years ago. Her smoking use included cigarettes. She has a 18.00 pack-year smoking history. She has never used smokeless tobacco.   Daily Aspirin? Yes    Hypertension:  Home blood pressure monitoring: No.  She is adherent to a low sodium diet. Patient denies chest pain, shortness of breath, headache, lightheadedness, blurred vision and palpitations. Antihypertensive medication side effects: no medication side effects noted. Use of agents associated with hypertension: none. Hyperlipidemia:  No new myalgias or GI upset on atorvastatin (Lipitor) and fenofibrate (Tricor, Trilipix).        The 10-year ASCVD risk score (Kathy Dexter et al., 2013) is: 3.6%    Values used to calculate the score:      Age: 53 years      Sex: Female      Is Non- : No      Diabetic: Yes      Tobacco smoker: No      Systolic Blood Pressure: 579 mmHg      Is BP treated: Yes      HDL Cholesterol: 29 mg/dL      Total Cholesterol: 162 mg/dL      Lab Results   Component Value Date    LABA1C 7.8 2020    LABA1C 7.6 2020    LABA1C 7.5 10/10/2019     Lab Results   Component Value Date    LABMICR 1.30 2019    CREATININE 0.7 08/16/2019     Lab Results   Component Value Date    ALT 20 08/16/2019    AST 16 08/16/2019     Lab Results   Component Value Date    CHOL 162 03/02/2020    TRIG 287 (H) 03/02/2020    HDL 29 (L) 03/02/2020    LDLCALC 76 03/02/2020    LDLDIRECT 96 03/06/2018        GERD/hypomagnesium: Controlled on omeprazole. On magnesium supplement. Review of Systems    Prior to Visit Medications    Medication Sig Taking? Authorizing Provider   atorvastatin (LIPITOR) 80 MG tablet Take 1 tablet by mouth daily Yes CONNER Nielsen CNP   fenofibrate (TRICOR) 145 MG tablet Take 1 tablet by mouth daily Yes CONNER Nielsen CNP   hydroCHLOROthiazide (HYDRODIURIL) 25 MG tablet Take 1 tablet by mouth daily Yes CONNER Nielsen CNP   omeprazole (PRILOSEC) 20 MG delayed release capsule Take 1 capsule by mouth 2 times daily  CONNER Pearson - CNP   Insulin Pen Needle 32G X 4 MM MISC 1 each by Does not apply route 2 times daily  Fatimah Hernandez MD   magnesium oxide (MAG-OX) 400 (241.3 Mg) MG TABS tablet Take 1 tablet by mouth 2 times daily  Belen Camara MD   empagliflozin (JARDIANCE) 25 MG tablet Take 25 mg by mouth daily  Fatimah Hernandez MD   insulin regular human (HUMULIN R U-500 KWIKPEN) 500 UNIT/ML SOPN concentrated injection pen INJECT [address] SUPPER  Fatimah Hernandez MD   metFORMIN (GLUCOPHAGE-XR) 500 MG extended release tablet TAKE 1 TABLET BY MOUTH 2 TIMES A DAY  Fatimah Hernandez MD   lisinopril (PRINIVIL;ZESTRIL) 40 MG tablet Take 1 tablet by mouth daily  CONNER Nielsen CNP   aspirin 81 MG EC tablet Take 1 tablet by mouth daily  Fatimah Hernandez MD   AGAMATRIX PRESTO TEST strip USE FOUR TIMES A DAY AS DIRECTED  Juana Kyle MD   AGAMATRIX ULTRA-THIN LANCETS MISC USE AS DIRECTED 4 TIMES DAILY  Brie Jones MD   medroxyPROGESTERone (DEPO-PROVERA) 150 MG/ML injection Inject 150 mg into the muscle once.     Historical Provider, MD tablet; Refill: 1  - fenofibrate (TRICOR) 145 MG tablet; Take 1 tablet by mouth daily  Dispense: 90 tablet; Refill: 0    2. Essential hypertension  Does not monitor at home but not having any symptoms. Reviewed last BMP. Will check at next visit. - hydroCHLOROthiazide (HYDRODIURIL) 25 MG tablet; Take 1 tablet by mouth daily  Dispense: 90 tablet; Refill: 0    3. Insulin dependent type 2 diabetes mellitus, controlled (HCC)  A1C increasing. Follows with Dr. Ugo Rodas. 4. Gastroesophageal reflux disease without esophagitis  Controlled on omeprazole. No follow-ups on file. Alex Lea is a 53 y.o. female being evaluated by a Virtual Visit (video visit) encounter to address concerns as mentioned above. A caregiver was present when appropriate. Due to this being a TeleHealth encounter (During QEXRH-72 public health emergency), evaluation of the following organ systems was limited: Vitals/Constitutional/EENT/Resp/CV/GI//MS/Neuro/Skin/Heme-Lymph-Imm. Pursuant to the emergency declaration under the [address] [place] [address], [address] [place] [address] authority and the [employer] Act, this Virtual Visit was conducted with patient's (and/or legal guardian's) consent, to reduce the patient's risk of exposure to COVID-19 and provide necessary medical care. The patient (and/or legal guardian) has also been advised to contact this office for worsening conditions or problems, and seek emergency medical treatment and/or call 911 if deemed necessary. Patient identification was verified at the start of the visit: Yes    Total time spent on this encounter: Not billed by time    Services were provided through a video synchronous discussion virtually to substitute for in-person clinic visit. Patient and provider were located at their individual homes.     --CONNER Fontanez CNP on 5/6/2020 at 4:12 PM    An electronic signature was used to

## 2020-05-28 ENCOUNTER — VIRTUAL VISIT (OUTPATIENT)
Dept: ENDOCRINOLOGY | Age: 45
End: 2020-05-28
Payer: COMMERCIAL

## 2020-05-28 PROCEDURE — 99214 OFFICE O/P EST MOD 30 MIN: CPT | Performed by: INTERNAL MEDICINE

## 2020-05-28 PROCEDURE — 3051F HG A1C>EQUAL 7.0%<8.0%: CPT | Performed by: INTERNAL MEDICINE

## 2020-05-28 NOTE — PROGRESS NOTES
Seen as f/u patient for diabetes        Pursuant to the emergency declaration under the [facility] waiver authority and the [employer] Act, this Virtual  Visit was conducted, with patient's consent, to reduce the patient's risk of exposure to COVID-19 and provide continuity of care for an established patient. Patient was at home. Provider was at home. No one else was involved  Services were provided through a video synchronous discussion virtually to substitute for in-person clinic visit.       Interim:   Fair control  No issues    Diagnosed with Type 2 diabetes mellitus 4-5 yrs ago  Known diabetic complications: None    Current diabetic medications   U-500 pen 120/115 but taking 125/125   SSI 5 for 50 >150     Metformin ER  500mg BID  jardiance 25mg    Previous  lantus 110 units HS  Novolog 25 TID + SSI 5 for 50>150  Usually takes 35-40  januvia 100mg  Was on victoza in the past for 2 months  Bydureon    Insulin started 3/13    Intolerance to diabetes medications: yes - Metformin     Last A1c 7.5%<------ 6.9%<-----7%<------6.6%<------6.6%<----- 6.8%<-----6.3 on 3/17<----6.3 on 12/16<----6.1 on 3/15  <---- 8.5 On 12/13<---- 9.6<---- 8.9 on 5/13<---10.8<---10.9    Prior visit with dietician: Yes   Current diet: on average, 3 meals per day 45gm CHO  Current exercise: No exercise for one month  Current monitoring regimen: home blood tests occ , mainly in evening    Has brought blood glucose log/meter:yes  Home blood sugar records:138-172  Any episodes of hypoglycemia? occ    No Hx of CAD , PVD, CVA    Hyperlipidemia: pravastatin 40mg fenofibrate 145   on 10/14-switched to lipitor 80mg LDL 73 on 2/16  LDL 96  HDL 29 on 3/18     LDL 82 on 2/19  Tolerating , no aches  LDL 76 on 3/20  Last eye exam: 11/18  Last foot exam:5/19  Last microalbumin to creatinine ratio: M/C nl on 5/19  ,   35 on 5/13 34 on 9/13  nl on

## 2020-05-29 DIAGNOSIS — I10 ESSENTIAL HYPERTENSION: ICD-10-CM

## 2020-05-30 LAB
ESTIMATED AVERAGE GLUCOSE: 174.3 MG/DL
HBA1C MFR BLD: 7.7 %

## 2020-07-14 RX ORDER — ASPIRIN 81 MG/1
81 TABLET ORAL DAILY
Qty: 100 TABLET | Refills: 3 | Status: SHIPPED | OUTPATIENT
Start: 2020-07-14 | End: 2021-08-02

## 2020-07-15 RX ORDER — METHYLPREDNISOLONE 4 MG/1
TABLET ORAL
Qty: 1 KIT | Refills: 0 | Status: SHIPPED | OUTPATIENT
Start: 2020-07-15 | End: 2020-07-21

## 2020-07-22 RX ORDER — BLOOD SUGAR DIAGNOSTIC
STRIP MISCELLANEOUS
Qty: 400 STRIP | Refills: 3 | Status: SHIPPED | OUTPATIENT
Start: 2020-07-22 | End: 2021-12-15

## 2020-07-24 ENCOUNTER — PATIENT MESSAGE (OUTPATIENT)
Dept: FAMILY MEDICINE CLINIC | Age: 45
End: 2020-07-24

## 2020-07-24 RX ORDER — CLINDAMYCIN HYDROCHLORIDE 300 MG/1
300 CAPSULE ORAL 3 TIMES DAILY
Qty: 30 CAPSULE | Refills: 0 | Status: SHIPPED | OUTPATIENT
Start: 2020-07-24 | End: 2020-08-03

## 2020-07-24 NOTE — TELEPHONE ENCOUNTER
From: Stacy Paniagua  To: CONNER Renae - CNP  Sent: 7/24/2020 2:47 PM EDT  Subject: Prescription Question    Looking for Amoxicillin prescription. Infected tooth. Cant see dentist until Tuesday. Roger on [facility] in [facility].

## 2020-08-05 ENCOUNTER — OFFICE VISIT (OUTPATIENT)
Dept: ORTHOPEDIC SURGERY | Age: 45
End: 2020-08-05
Payer: COMMERCIAL

## 2020-08-05 VITALS — BODY MASS INDEX: 50.97 KG/M2 | WEIGHT: 277 LBS | HEIGHT: 62 IN

## 2020-08-05 PROCEDURE — 99203 OFFICE O/P NEW LOW 30 MIN: CPT | Performed by: PHYSICIAN ASSISTANT

## 2020-08-05 PROCEDURE — 20550 NJX 1 TENDON SHEATH/LIGAMENT: CPT | Performed by: PHYSICIAN ASSISTANT

## 2020-08-06 RX ORDER — LIDOCAINE HYDROCHLORIDE 10 MG/ML
2.5 INJECTION, SOLUTION INFILTRATION; PERINEURAL ONCE
Status: DISCONTINUED | OUTPATIENT
Start: 2020-08-06 | End: 2022-02-14

## 2020-08-06 RX ORDER — METHYLPREDNISOLONE ACETATE 40 MG/ML
0.5 INJECTION, SUSPENSION INTRA-ARTICULAR; INTRALESIONAL; INTRAMUSCULAR; SOFT TISSUE ONCE
Status: DISCONTINUED | OUTPATIENT
Start: 2020-08-06 | End: 2020-08-11

## 2020-08-06 NOTE — PROGRESS NOTES
Leonarda Lowell  [id number]  August 6, 2020    Chief Complaint   Patient presents with    Finger Pain     R ring finger pain        History: Patient is a pleasant 39-year-old female who presents for evaluation of her right fourth finger. Patient has a history of a trigger finger in this digit. She received a steroid injection many years ago, which provided relief up until recently. She notes triggering has resumed over the past few weeks. It gradually releases during the day, but she still lacks full extension. Patient recently tried a steroid Dosepak without complete relief. She denies any redness, warmth, swelling around the area. Pain is localized to the associated nodule. She has normal sensation in the digit    The patient's  past medical history, medications, allergies,  family history, social history, and have been reviewed, and dated and are recorded in the chart. Pertinent items are noted in HPI. Review of systems reviewed from Pertinent History Form dated on 1/30/2020 and available in the patient's chart under the Media tab. Vitals:  Ht 5' 2\" (1.575 m)   Wt 277 lb (125.6 kg)   BMI 50.66 kg/m²     Physical:     General: Well-developed well-nourished female in no acute distress. Alert and oriented x3. Cooperative throughout exam    Right hand: Skin intact with no erythema. Visible lack of full extension of right fourth digit. Palpable nodule along A1 pulley of fourth digit. Tenderness to palpation over same area. Patient able to actively flex and extend all digits. Sensation intact to light touch distally. Brisk capillary refill    Impression: 39-year-old female with right hand fourth finger trigger finger      Plan: At this time discussed option for injection with patient.   She would like to proceed with same  Discussed potential for this to only work partially or not at all  May need surgical intervention in the future if it becomes bothersome enough  Continue working on ROM of

## 2020-08-10 RX ORDER — FENOFIBRATE 145 MG/1
145 TABLET, COATED ORAL DAILY
Qty: 90 TABLET | Refills: 0 | OUTPATIENT
Start: 2020-08-10

## 2020-08-10 RX ORDER — HYDROCHLOROTHIAZIDE 25 MG/1
25 TABLET ORAL DAILY
Qty: 90 TABLET | Refills: 0 | OUTPATIENT
Start: 2020-08-10

## 2020-08-11 RX ORDER — METHYLPREDNISOLONE ACETATE 40 MG/ML
20 INJECTION, SUSPENSION INTRA-ARTICULAR; INTRALESIONAL; INTRAMUSCULAR; SOFT TISSUE ONCE
Status: COMPLETED | OUTPATIENT
Start: 2020-08-11 | End: 2020-08-11

## 2020-08-11 RX ORDER — LIDOCAINE HYDROCHLORIDE 10 MG/ML
2.5 INJECTION, SOLUTION INFILTRATION; PERINEURAL ONCE
Status: COMPLETED | OUTPATIENT
Start: 2020-08-11 | End: 2020-08-11

## 2020-08-11 RX ADMIN — METHYLPREDNISOLONE ACETATE 20 MG: 40 INJECTION, SUSPENSION INTRA-ARTICULAR; INTRALESIONAL; INTRAMUSCULAR; SOFT TISSUE at 11:51

## 2020-08-11 RX ADMIN — LIDOCAINE HYDROCHLORIDE 2.5 ML: 10 INJECTION, SOLUTION INFILTRATION; PERINEURAL at 11:50

## 2020-08-13 RX ORDER — FENOFIBRATE 145 MG/1
145 TABLET, COATED ORAL DAILY
Qty: 90 TABLET | Refills: 0 | Status: SHIPPED | OUTPATIENT
Start: 2020-08-13 | End: 2020-11-08 | Stop reason: SDUPTHER

## 2020-08-13 RX ORDER — HYDROCHLOROTHIAZIDE 25 MG/1
25 TABLET ORAL DAILY
Qty: 90 TABLET | Refills: 0 | Status: SHIPPED | OUTPATIENT
Start: 2020-08-13 | End: 2020-11-08 | Stop reason: SDUPTHER

## 2020-08-14 ENCOUNTER — NURSE ONLY (OUTPATIENT)
Dept: FAMILY MEDICINE CLINIC | Age: 45
End: 2020-08-14
Payer: COMMERCIAL

## 2020-08-14 VITALS — SYSTOLIC BLOOD PRESSURE: 113 MMHG | DIASTOLIC BLOOD PRESSURE: 72 MMHG

## 2020-08-14 PROCEDURE — 36415 COLL VENOUS BLD VENIPUNCTURE: CPT | Performed by: FAMILY MEDICINE

## 2020-08-15 LAB
ANION GAP SERPL CALCULATED.3IONS-SCNC: 16 MMOL/L (ref 3–16)
BUN BLDV-MCNC: 17 MG/DL (ref 7–20)
CALCIUM SERPL-MCNC: 9.9 MG/DL (ref 8.3–10.6)
CHLORIDE BLD-SCNC: 100 MMOL/L (ref 99–110)
CO2: 22 MMOL/L (ref 21–32)
CREAT SERPL-MCNC: 0.9 MG/DL (ref 0.6–1.1)
GFR AFRICAN AMERICAN: >60
GFR NON-AFRICAN AMERICAN: >60
GLUCOSE BLD-MCNC: 185 MG/DL (ref 70–99)
POTASSIUM SERPL-SCNC: 4.4 MMOL/L (ref 3.5–5.1)
SODIUM BLD-SCNC: 138 MMOL/L (ref 136–145)

## 2020-10-19 VITALS — BODY MASS INDEX: 50.66 KG/M2 | WEIGHT: 277 LBS

## 2020-11-03 RX ORDER — ATORVASTATIN CALCIUM 80 MG/1
80 TABLET, FILM COATED ORAL DAILY
Qty: 90 TABLET | Refills: 0 | OUTPATIENT
Start: 2020-11-03

## 2020-11-03 NOTE — TELEPHONE ENCOUNTER
Pt calling RE: refill. She has an appt scheduled for 11/20/20. Wanting to know if will call in refill for med. Please advise.

## 2020-11-04 RX ORDER — ATORVASTATIN CALCIUM 80 MG/1
TABLET, FILM COATED ORAL
Qty: 90 TABLET | Refills: 0 | Status: SHIPPED | OUTPATIENT
Start: 2020-11-04 | End: 2021-01-26

## 2020-11-09 RX ORDER — FENOFIBRATE 145 MG/1
145 TABLET, COATED ORAL DAILY
Qty: 90 TABLET | Refills: 0 | Status: SHIPPED | OUTPATIENT
Start: 2020-11-09 | End: 2021-01-26

## 2020-11-09 RX ORDER — HYDROCHLOROTHIAZIDE 25 MG/1
25 TABLET ORAL DAILY
Qty: 90 TABLET | Refills: 0 | Status: SHIPPED | OUTPATIENT
Start: 2020-11-09 | End: 2021-01-26

## 2020-11-20 ENCOUNTER — OFFICE VISIT (OUTPATIENT)
Dept: FAMILY MEDICINE CLINIC | Age: 45
End: 2020-11-20
Payer: COMMERCIAL

## 2020-11-20 VITALS
BODY MASS INDEX: 52.63 KG/M2 | HEART RATE: 92 BPM | WEIGHT: 286 LBS | DIASTOLIC BLOOD PRESSURE: 81 MMHG | HEIGHT: 62 IN | SYSTOLIC BLOOD PRESSURE: 125 MMHG | TEMPERATURE: 98.2 F

## 2020-11-20 LAB
ALBUMIN SERPL-MCNC: 4.6 G/DL (ref 3.4–5)
ALP BLD-CCNC: 53 U/L (ref 40–129)
ALT SERPL-CCNC: 23 U/L (ref 10–40)
AST SERPL-CCNC: 26 U/L (ref 15–37)
BILIRUB SERPL-MCNC: 0.3 MG/DL (ref 0–1)
BILIRUBIN DIRECT: <0.2 MG/DL (ref 0–0.3)
BILIRUBIN, INDIRECT: NORMAL MG/DL (ref 0–1)
CREATININE URINE: 86.2 MG/DL (ref 28–259)
HBA1C MFR BLD: 7.4 %
MAGNESIUM: 1.6 MG/DL (ref 1.8–2.4)
MICROALBUMIN UR-MCNC: 2.9 MG/DL
MICROALBUMIN/CREAT UR-RTO: 33.6 MG/G (ref 0–30)
TOTAL PROTEIN: 7.2 G/DL (ref 6.4–8.2)

## 2020-11-20 PROCEDURE — 99214 OFFICE O/P EST MOD 30 MIN: CPT | Performed by: FAMILY MEDICINE

## 2020-11-20 PROCEDURE — 83036 HEMOGLOBIN GLYCOSYLATED A1C: CPT | Performed by: FAMILY MEDICINE

## 2020-11-20 PROCEDURE — 3051F HG A1C>EQUAL 7.0%<8.0%: CPT | Performed by: FAMILY MEDICINE

## 2020-11-20 PROCEDURE — 36415 COLL VENOUS BLD VENIPUNCTURE: CPT | Performed by: FAMILY MEDICINE

## 2020-11-20 ASSESSMENT — PATIENT HEALTH QUESTIONNAIRE - PHQ9
SUM OF ALL RESPONSES TO PHQ QUESTIONS 1-9: 0
SUM OF ALL RESPONSES TO PHQ9 QUESTIONS 1 & 2: 0
1. LITTLE INTEREST OR PLEASURE IN DOING THINGS: 0
2. FEELING DOWN, DEPRESSED OR HOPELESS: 0
SUM OF ALL RESPONSES TO PHQ QUESTIONS 1-9: 0
SUM OF ALL RESPONSES TO PHQ QUESTIONS 1-9: 0

## 2020-11-20 NOTE — PROGRESS NOTES
Ulices Graham MD  [facility]) Physicians  Kodi Masters [address] [address]  [phone] office  [phone] fax    Name:  Carlene Atkins  Date:  11/20/2020      CC:  DM2, HTN, HLD, SUNDAY, GERD, hypomagnesium    Pt here for 6 month management of chronic medical conditions, to receive blood work and medication management. Treatment Adherence:   Medication compliance:  compliant all of the time  Diet compliance: eating 45 g carbs per meal about 50% of the time  Weight trend: gained 9 lbs since last appt. Not walking as much during the day. Has desk job now  Current exercise: none  Barriers: none    Diabetes Mellitus Type 2: Current symptoms/problems include none. Taking U500 insulin managed by dr Lois Packer. She has appt with Dr Kip Campbell on 11/30/20    Also takes metformin 500mg XR BID and jardiance     Home blood sugar records: 120--140s   Any episodes of hypoglycemia? none  Eye exam current (within one year): yes, but we don't have report. Tobacco history: She  reports that she quit smoking about 10 years ago. Her smoking use included cigarettes. She has a 18.00 pack-year smoking history. She has never used smokeless tobacco.   Daily Aspirin? Yes  Known diabetic complications: none    Hypertension:  Home blood pressure monitoring: No.  She is adherent to a low sodium diet. Patient denies chest pain, shortness of breath, headache, lightheadedness, blurred vision, peripheral edema, palpitations, dry cough and fatigue. Antihypertensive medication side effects: no medication side effects noted. Use of agents associated with hypertension: none. Hyperlipidemia:  No myalgias or stomach upset on tricor and lipitor.       Lab Results   Component Value Date    LABA1C 7.4 11/20/2020    LABA1C 7.7 05/29/2020    LABA1C 7.8 03/02/2020     Lab Results   Component Value Date    LABMICR 1.30 05/06/2019    CREATININE 0.9 08/14/2020     Lab Results   Component Value Date    ALT 20 08/16/2019    AST 16 08/16/2019     Lab Results   Component Value Date    CHOL 162 03/02/2020    TRIG 287 (H) 03/02/2020    HDL 29 (L) 03/02/2020    LDLCALC 76 03/02/2020    LDLDIRECT 96 03/06/2018          Sleep Apnea:  Current treatment: CPAP. Residual symptoms include: none. Managed by dr Mckenna Salter annually in december    GERD/hypomagnesium  Well controlled on PPI. We did trial of zantac in the past, but sx's weren't controlled. No Abd pain. No heart burn.  Takes mag supplementation  Lab Results   Component Value Date    MG 2.10 08/16/2019       Vitals:    11/20/20 1119   BP: 125/81   Pulse: 92   Temp: 98.2 °F (36.8 °C)   TempSrc: Oral   Weight: 286 lb (129.7 kg)   Height: 5' 2\" (1.575 m)       Outpatient Medications Marked as Taking for the 11/20/20 encounter (Office Visit) with Mukul Pabon MD   Medication Sig Dispense Refill    magnesium oxide (MAG-OX) 400 (241.3 Mg) MG TABS tablet Take 1 tablet by mouth 2 times daily 180 tablet 1    fenofibrate (TRICOR) 145 MG tablet Take 1 tablet by mouth daily 90 tablet 0    hydroCHLOROthiazide (HYDRODIURIL) 25 MG tablet Take 1 tablet by mouth daily 90 tablet 0    atorvastatin (LIPITOR) 80 MG tablet TAKE 1 TABLET BY MOUTH ONE TIME A DAY 90 tablet 0    lisinopril (PRINIVIL;ZESTRIL) 40 MG tablet Take 1 tablet by mouth daily 90 tablet 0    blood glucose test strips (AGAMATRIX PRESTO TEST) strip Check sugars /qid 400 strip 3    aspirin 81 MG EC tablet Take 1 tablet by mouth daily 100 tablet 3    omeprazole (PRILOSEC) 20 MG delayed release capsule Take 1 capsule by mouth 2 times daily 180 capsule 1    Insulin Pen Needle 32G X 4 MM MISC 1 each by Does not apply route 2 times daily 200 each 3    empagliflozin (JARDIANCE) 25 MG tablet Take 25 mg by mouth daily 90 tablet 3    insulin regular human (HUMULIN R U-500 KWIKPEN) 500 UNIT/ML SOPN concentrated injection pen INJECT 110 UNITS BEFORE BREAKFAST AND SUPPER 54 mL 3    metFORMIN (GLUCOPHAGE-XR) 500 MG extended release tablet TAKE 1 TABLET BY MOUTH 2 TIMES A  tablet 6    AGAMATRIX ULTRA-THIN LANCETS MISC USE AS DIRECTED 4 TIMES DAILY 400 each 3    medroxyPROGESTERone (DEPO-PROVERA) 150 MG/ML injection Inject 150 mg into the muscle once. Past Medical History:   Diagnosis Date    Anxiety     DM type 2 with diabetic dyslipidemia ([facility] [address]     Family history of BRCA gene positive     mom was BRCA positive, pt has never been tested   [employer] Family history of breast cancer in female     GERD (gastroesophageal reflux disease)     History of tobacco abuse     Hyperlipidemia     Hypertension     Hypomagnesemia     dx 2015, on PPI.  started on replacement    Low grade squamous intraepith lesion on cytologic smear cervix (lgsil)     2018    SUNDAY on CPAP        Past Surgical History:   Procedure Laterality Date    CERVICAL CERCLAGE       SECTION      WISDOM TOOTH EXTRACTION         Social History     Tobacco Use    Smoking status: Former Smoker     Packs/day: 1.00     Years: 18.00     Pack years: 18.00     Types: Cigarettes     Last attempt to quit: 2010     Years since quitting: 10.2    Smokeless tobacco: Never Used   Substance Use Topics    Alcohol use: No       Family History   Problem Relation Age of Onset    Cancer Mother 62        breast    High Blood Pressure Father     Diabetes Father     Heart Disease Father     Cancer Other 32        breast    Cancer Maternal Aunt 50        breast    Heart Disease Paternal Uncle     Stroke Maternal Grandfather     Heart Disease Paternal Aunt     Heart Disease Paternal Uncle     Heart Disease Paternal Uncle     Heart Disease Paternal Uncle                Review of Systems   See hpi     Objective:    Physical Exam   Constitutional:   · Reviewed vitals above  · Well Nourished, well developed, no distress    · +lot of dental decay     Neck:  · Symmetric and without masses  · No thyromegaly  Resp:  · Normal effort  · Clear to auscultation bilaterally

## 2020-11-23 ENCOUNTER — TELEPHONE (OUTPATIENT)
Dept: FAMILY MEDICINE CLINIC | Age: 45
End: 2020-11-23

## 2020-11-23 RX ORDER — INSULIN HUMAN 500 [IU]/ML
INJECTION, SOLUTION SUBCUTANEOUS
Qty: 54 ML | Refills: 3 | Status: SHIPPED | OUTPATIENT
Start: 2020-11-23 | End: 2021-01-29 | Stop reason: SDUPTHER

## 2020-11-23 NOTE — TELEPHONE ENCOUNTER
Medication:   Requested Prescriptions     Pending Prescriptions Disp Refills    insulin regular human (HUMULIN R U-500 KWIKPEN) 500 UNIT/ML SOPN concentrated injection pen 54 mL 3     Sig: INJECT 110 UNITS BEFORE BREAKFAST AND SUPPER       Last Filled:      Patient Phone Number: [phone] (home) [phone] (work)    Last appt: 05/28  Next appt: 11/30    Last Labs DM:   Lab Results   Component Value Date    LABA1C 7.4 11/20/2020

## 2020-11-23 NOTE — TELEPHONE ENCOUNTER
----- Message from Chon Urbina MD sent at 11/22/2020  4:49 PM EST -----    1) the protein in pt's urine is elevated, suggesting the diabetes is starting to have an input on patients kidneys. Pt should work on weight loss, exercise and keeping diabetes controlled    2) her magnesium level is low. Find out if she is remembering to take her daily magnesium. If she is, tell her to increase to 1 pill in the morning and 1 pill at night (and then change medication in chart to reflect this). If she is not remembering, tell her to make sure she is taking it every day. Please document call and then close encounter.   thanks

## 2020-12-01 ENCOUNTER — VIRTUAL VISIT (OUTPATIENT)
Dept: ENDOCRINOLOGY | Age: 45
End: 2020-12-01
Payer: COMMERCIAL

## 2020-12-01 PROCEDURE — 99213 OFFICE O/P EST LOW 20 MIN: CPT | Performed by: INTERNAL MEDICINE

## 2020-12-01 PROCEDURE — 3051F HG A1C>EQUAL 7.0%<8.0%: CPT | Performed by: INTERNAL MEDICINE

## 2020-12-01 NOTE — PROGRESS NOTES
Seen as f/u patient for diabetes        Pursuant to the emergency declaration under the [facility], 1135 waiver authority and the [employer] Act, this Virtual  Visit was conducted, with patient's consent, to reduce the patient's risk of exposure to COVID-19 and provide continuity of care for an established patient. Patient was at home. Provider was at home. No one else was involved  Services were provided through a video synchronous discussion virtually to substitute for in-person clinic visit.       Interim:   Fair control  No issues  Taking higher dose    Diagnosed with Type 2 diabetes mellitus 4-5 yrs ago  Known diabetic complications: None    Current diabetic medications   U-500 pen 135/135   SSI 5 for 50 >150     Metformin ER  500mg BID  jardiance 25mg    Previous  lantus 110 units HS  Novolog 25 TID + SSI 5 for 50>150  Usually takes 35-40  januvia 100mg  Was on victoza in the past for 2 months  Bydureon    Moderate, uncontrolled    Insulin started 3/13    Intolerance to diabetes medications: yes - Metformin     Last A1c 7.4%<-----7.5%<------ 6.9%<-----7%<------6.6%<------6.6%<----- 6.8%<-----6.3 on 3/17<----6.3 on 12/16<----6.1 on 3/15  <---- 8.5 On 12/13<---- 9.6<---- 8.9 on 5/13<---10.8<---10.9    Prior visit with dietician: Yes   Current diet: on average, 3 meals per day 45gm CHO  Current exercise: No exercise for one month  Current monitoring regimen: home blood tests occ , mainly in evening    Has brought blood glucose log/meter:yes  Home blood sugar records:114-200  Any episodes of hypoglycemia? occ    No Hx of CAD , PVD, CVA    Hyperlipidemia: pravastatin 40mg fenofibrate 145   on 10/14-switched to lipitor 80mg LDL 73 on 2/16  LDL 96  HDL 29 on 3/18     LDL 82 on 2/19  Tolerating , no aches  LDL 76 on 3/20  Last eye exam: 11/19  Last foot exam:5/19  Last microalbumin to creatinine ratio: M/C nl on 5/19 ,   35 on 5/13 34 on 9/13  nl on 3/12---> 33.6 on 11/20  HTN: Stable, tolerating Lisinopril 40mg HCTZ 25mg    4/14  CGMS Report   CGMS insertion date 4/29/14 CGMS data collection was performed on 4/29/14 - 5/3/14. Patient provided written diet, activities and medical management for specified period. MAD 3.6 %   Average reading 252 mg/dL   Std Dev 49 mg/dL   % of time <70 mg/dL 0 %   % of time >140 mg/dL 100 %   Number of hypoglycemia episodes noted: 0   CGMS showed daytime and nocturnal hyperglycemia   Impression:   Daytime and nocturnal hyperglycemia . No documentation of U500 injections. See scanned i-Pro download   Recommendation:   Advised patient to reduce carbohydrate intake to no more than 30 grams with large meals. Add Invokana or [place]. Increase Humulin U500 by 5-10% or change Humulin U500 to be delivered via V-Go 20 and IC of 1:6    Review of Systems    Constitutional: Negative for weight loss and malaise/fatigue. Negative for fever   Eyes: Negative for blurred vision. Negative for double vision, photophobia and pain. Respiratory: Negative for cough and sputum production. Cardiovascular: Negative for chest pain, palpitations and leg swelling. Gastrointestinal: Negative for nausea, vomiting and abdominal pain. Genitourinary: Negative for dysuria, urgency and frequency. Musculoskeletal: Negative for back pain. No joint pain  Skin: Negative for itching and rash. Neurological: Negative for dizziness. Negative for tingling, tremors, focal weakness and headaches.    Endo/Heme/Allergies: see HPI        PHYSICAL EXAMINATION:  [ INSTRUCTIONS:  \"[x]\" Indicates a positive item  \"[]\" Indicates a negative item  -- DELETE ALL ITEMS NOT EXAMINED]  Vital Signs: (As obtained by patient/caregiver or practitioner observation)    Blood pressure-  Heart rate-    Respiratory rate-    Temperature-  Pulse oximetry-     Constitutional Appears well-developed and well-nourished No apparent distress        Mental status  Alert and awake  Oriented to person/place/time  Able to follow commands      Eyes:  EOM    [x]  Normal    Sclera  [x]  Normal           Discharge [x]  None visible      HENT:   [x] Normocephalic, atraumatic. [x] Mouth/Throat: Mucous membranes are moist.     External Ears [x] Normal      Neck: [x] No visualized mass     Pulmonary/Chest: [x] Respiratory effort normal.  [x] No visualized signs of difficulty breathing or respiratory distress             Musculoskeletal:   [x] Normal gait with no signs of ataxia         [x] Normal range of motion of neck              Neurological:        [x] No Facial Asymmetry (Cranial nerve 7 motor function) (limited exam to video visit)          [x] No gaze palsy                Skin:        [x] No significant exanthematous lesions or discoloration noted on facial skin                 Psychiatric:       [x] Normal Affect [x] No Hallucinations            Other pertinent observable physical exam findings-     Due to this being a TeleHealth encounter, evaluation of the following organ systems is limited: Vitals/Constitutional/EENT/Resp/CV/GI//MS/Neuro/Skin/Heme-Lymph-Imm. Services were provided through a video synchronous discussion virtually to substitute for in-person clinic visit.      5/19  Skeletal foot exam is normal, no skin lesions, toenails are normal, 10 g monofilament is 10/10, Dry feet    Lab Reviewed   No components found for: CHLPL  Lab Results   Component Value Date    TRIG 287 (H) 03/02/2020    TRIG 278 (H) 02/27/2019    TRIG 342 (H) 03/06/2018     Lab Results   Component Value Date    HDL 29 (L) 03/02/2020    HDL 32 (L) 02/27/2019    HDL 29 (L) 03/06/2018     Lab Results   Component Value Date    LDLCALC 76 03/02/2020    LDLCALC 82 02/27/2019    LDLCALC see below 03/06/2018     Lab Results   Component Value Date    LABVLDL 56 02/27/2019    LABVLDL see below 03/06/2018    LABVLDL 28 02/10/2015     Lab Results   Component Value Date    LABA1C 7.4 11/20/2020 Assessment:     Juve Blue is a 53 y.o. female with :    1.T2DM:Longstanding, insulin resistant,fairly controlled. Given A1c and insulin requirements ,  switched to U-500 insulin. Added GLP agonist, did not help. On SGLT-2 inhibitor, A1c high, needs self monitoring . Make insulin TID  2.HTN;At goal  3. HLD:LDL at goal, on lipitor/ fenofibrate  4. Obesity:Recommend aggressive life style and discussed bariatric option  5. SUNDAY: using Cpap machine more now    Plan:       U-500  100/90/90   SSI 5 for 50>150    jardiance 25mg    Metformin ER 500mg BID   Discussed in detail use of U-500. Discussed side effects of hypoglcyemia   Advise to check blood sugar 2 times a day   Patient to send blood sugar log for titration. Advise to exercise regularly. Advise to low simple carbohydrate and protein with each  meal diet. Diabetes Care: recommend yearly eye exam, foot exam and urine microalbumin to   creatinine ratio.      -Hyperlipidemia, LDL goal is <100 mg/dl   -Hypertension:Continue current  -Daily ASA:Not indicated  -Smoking status:Non smoker    She is on U-500 insulin which has a narrow therapeutic index and high risk of complications including severe hypoglycemia

## 2020-12-28 RX ORDER — METFORMIN HYDROCHLORIDE 500 MG/1
TABLET, EXTENDED RELEASE ORAL
Qty: 180 TABLET | Refills: 1 | Status: SHIPPED | OUTPATIENT
Start: 2020-12-28 | End: 2021-04-16 | Stop reason: ALTCHOICE

## 2020-12-30 RX ORDER — OMEPRAZOLE 20 MG/1
20 CAPSULE, DELAYED RELEASE ORAL 2 TIMES DAILY
Qty: 180 CAPSULE | Refills: 1 | Status: SHIPPED | OUTPATIENT
Start: 2020-12-30 | End: 2021-07-12

## 2021-01-11 RX ORDER — EMPAGLIFLOZIN 25 MG/1
25 TABLET, FILM COATED ORAL DAILY
Qty: 90 TABLET | Refills: 1 | Status: SHIPPED | OUTPATIENT
Start: 2021-01-11 | End: 2021-07-12

## 2021-01-11 NOTE — TELEPHONE ENCOUNTER
Medication:   Requested Prescriptions     Pending Prescriptions Disp Refills    empagliflozin (JARDIANCE) 25 MG tablet 90 tablet 3     Sig: Take 25 mg by mouth daily       Last appt: 12/1/2020   Next appt: Visit date not found    Last Labs DM:   Lab Results   Component Value Date    LABA1C 7.4 11/20/2020

## 2021-01-23 ENCOUNTER — TELEPHONE (OUTPATIENT)
Dept: FAMILY MEDICINE CLINIC | Age: 46
End: 2021-01-23

## 2021-01-23 DIAGNOSIS — E78.49 OTHER HYPERLIPIDEMIA: ICD-10-CM

## 2021-01-23 DIAGNOSIS — K21.9 GASTROESOPHAGEAL REFLUX DISEASE WITHOUT ESOPHAGITIS: ICD-10-CM

## 2021-01-23 DIAGNOSIS — I10 ESSENTIAL HYPERTENSION: ICD-10-CM

## 2021-01-26 RX ORDER — HYDROCHLOROTHIAZIDE 25 MG/1
TABLET ORAL
Qty: 90 TABLET | Refills: 0 | Status: SHIPPED | OUTPATIENT
Start: 2021-01-26 | End: 2021-05-10

## 2021-01-26 RX ORDER — LISINOPRIL 40 MG/1
TABLET ORAL
Qty: 90 TABLET | Refills: 0 | Status: SHIPPED | OUTPATIENT
Start: 2021-01-26 | End: 2021-07-12

## 2021-01-26 RX ORDER — ATORVASTATIN CALCIUM 80 MG/1
TABLET, FILM COATED ORAL
Qty: 90 TABLET | Refills: 0 | Status: SHIPPED | OUTPATIENT
Start: 2021-01-26 | End: 2021-05-03

## 2021-01-26 RX ORDER — FENOFIBRATE 145 MG/1
TABLET, COATED ORAL
Qty: 90 TABLET | Refills: 0 | Status: SHIPPED | OUTPATIENT
Start: 2021-01-26 | End: 2021-05-10

## 2021-01-29 ENCOUNTER — TELEPHONE (OUTPATIENT)
Dept: PHARMACY | Facility: CLINIC | Age: 46
End: 2021-01-29

## 2021-01-29 DIAGNOSIS — Z79.4 INSULIN DEPENDENT TYPE 2 DIABETES MELLITUS, CONTROLLED (HCC): ICD-10-CM

## 2021-01-29 DIAGNOSIS — E11.9 INSULIN DEPENDENT TYPE 2 DIABETES MELLITUS, CONTROLLED (HCC): ICD-10-CM

## 2021-01-29 NOTE — TELEPHONE ENCOUNTER
Attempting to reach patient to schedule a pharmacist appointment to discuss medications for the 2021 Diabetes Management Program.    No answer. Left VM on home/cell TAD: Please call back at [phone] Option #7 to retrieve the above message.       [facility]

## 2021-01-29 NOTE — TELEPHONE ENCOUNTER
Dr. Christel Devi: patient is currently enrolled in [address] [place], [place] [address] Employee Diabetes Program - Be Well with Diabetes  · Pt would like updated Rx for Humulin R U500 with updated directions (100/90/90 per 12/1/20 VV) so she does not run out early  · Pt also needs Rx for increased pen needle qty since now injecting insulin pen TID  · See 1/29/2021 pharmacy visit for details     Orders pending for provider convenience, please review/modify & sign if in agreement. Thank you!   Jasmin Neves, PharmD, [facility]  Department, toll free: [phone], option 7

## 2021-01-29 NOTE — TELEPHONE ENCOUNTER
[facility] HEALTH CLINICAL PHARMACY REVIEW - Be Well with Diabetes    Berhane Colon is a 50 y.o. female enrolled in the [address] [place] [address] Employee Diabetes Program. Patient provided Beronica Granger with verbal consent to remain in the program for this year. Medications:  Medication Sig    hydroCHLOROthiazide (HYDRODIURIL) 25 MG tablet TAKE 1 TABLET BY MOUTH ONE TIME A DAY    lisinopril (PRINIVIL;ZESTRIL) 40 MG tablet TAKE 1 TABLET BY MOUTH ONE TIME A DAY    magnesium oxide (MAG-OX) 400 (241.3 Mg) MG TABS tablet TAKE 1 TABLET BY MOUTH 2 TIMES A DAY    fenofibrate (TRICOR) 145 MG tablet TAKE 1 TABLET BY MOUTH ONE TIME A DAY    atorvastatin (LIPITOR) 80 MG tablet TAKE 1 TABLET BY MOUTH ONE TIME A DAY    empagliflozin (JARDIANCE) 25 MG tablet Take 25 mg by mouth daily    omeprazole (PRILOSEC) 20 MG delayed release capsule Take 1 capsule by mouth 2 times daily    metFORMIN (GLUCOPHAGE-XR) 500 MG extended release tablet Take 1 tablet by mouth 2 times a day    insulin regular human (HUMULIN R U-500 KWIKPEN) 500 UNIT/ML SOPN concentrated injection pen INJECT 110 UNITS BEFORE BREAKFAST AND SUPPER  - 100-90-90 per 12/1/2021    blood glucose test strips (AGAMATRIX PRESTO TEST) strip Check sugars /qid    aspirin 81 MG EC tablet Take 1 tablet by mouth daily    Insulin Pen Needle 32G X 4 MM MISC 1 each by Does not apply route 2 times daily    metFORMIN (GLUCOPHAGE-XR) 500 MG extended release tablet TAKE 1 TABLET BY MOUTH 2 TIMES A DAY  - duplicate    AGAMATRIX ULTRA-THIN LANCETS MISC USE AS DIRECTED 4 TIMES DAILY    medroxyPROGESTERone (DEPO-PROVERA) 150 MG/ML injection Inject 150 mg into the muscle once. Current Pharm[facility]: [facility]  Current testing supplies/frequency: [employer] Presto; working well. Discussed CGMs. No issues with hypoglycemia or readings less than 70. Would like to continue with current supplies. Pen needles/syringes: on free pen needles - 32G x 4mm, size OK. Needs updated Rx to TID with recent increase in Humulin R administration from [facility]      Allergies:  No Known Allergies   Vitals/Labs:  BP Readings from Last 3 Encounters:   11/20/20 125/81   08/14/20 113/72   01/30/20 118/60     Component      Latest Ref Rng & Units 11/20/2020 5/6/2019 5/4/2018          11:52 AM  7:46 AM  9:59 AM   Creatinine, Ur      28.0 - 259.0 mg/dL 86.2 193.9 99.6   Microalbumin, Random Urine      <2.0 mg/dL 2.90 (H) 1.30 <1.20   Microalbumin Creatinine Ratio      0.0 - 30.0 mg/g 33.6 (H) 6.7 see below     Lab Results   Component Value Date    LABA1C 7.4 11/20/2020    LABA1C 7.7 05/29/2020    LABA1C 7.8 03/02/2020     Lab Results   Component Value Date    CHOL 162 03/02/2020    TRIG 287 (H) 03/02/2020    HDL 29 (L) 03/02/2020    LDLCALC 76 03/02/2020    LDLDIRECT 96 03/06/2018     ALT   Date Value Ref Range Status   11/20/2020 23 10 - 40 U/L Final     AST   Date Value Ref Range Status   11/20/2020 26 15 - 37 U/L Final     The 10-year ASCVD risk score (Minetta Riedel., et al., 2013) is: 4.3%    Values used to calculate the score:      Age: 50 years      Sex: Female      Is Non- : No      Diabetic: Yes      Tobacco smoker: No      Systolic Blood Pressure: 247 mmHg      Is BP treated: Yes      HDL Cholesterol: 29 mg/dL      Total Cholesterol: 162 mg/dL     Lab Results   Component Value Date    CREATININE 0.9 08/14/2020     CrCl cannot be calculated (Patient's most recent lab result is older than the maximum 120 days allowed. ).     Component      Latest Ref Rng & Units 8/14/2020 8/16/2019 2/27/2019           3:23 PM  3:57 PM  9:37 AM   GFR Non-      >60 >60 >60 >60       Immunizations:  Immunization History   Administered Date(s) Administered    Influenza Vaccine, unspecified formulation 10/10/2016    Influenza Virus Vaccine 09/28/2017, 09/19/2018, 09/28/2018, 09/30/2019, 11/09/2020    Pneumococcal Polysaccharide ([id number]) 02/26/2016    Tdap (Boostrix, Adacel) 06/27/2012      Social History:  Social History     Tobacco Use    Smoking status: Former Smoker     Packs/day: 1.00     Years: 18.00     Pack years: 18.00     Types: Cigarettes     Quit date: 9/1/2010     Years since quitting: 10.4    Smokeless tobacco: Never Used   Substance Use Topics    Alcohol use: No     ASSESSMENT:  Initial Program Requirements (Y indicates has completed for the year, N indicates needs to be completed by 07/01/2021): No - Provider Visit for DM (1st)  No - A1c (1st)     Ongoing Program Requirements (Y indicates has completed for the year, N indicates needs to be completed by 12/31/2021): No - Provider Visit for DM (2nd)  Yes - ACC/diabetes educator visit (if A1c over 8%)  No - A1c (2nd)  No - Lipid panel  No - Urine microalbumin  Yes - Pneumococcal vaccination: Up-to-date, not needed again until age 70  No - Influenza vaccination for Fall 2021  Yes - Medication adherence over 70% - n/a on insulin  Yes - On statin or contraindication(s) atorvastatin  Yes - On ACEi/ARB or contraindication(s) lisinopril     Formulary Medication Review:  Non-formulary or medications with cost-effective alternatives: none identified. Current medications eligible for copay waiver, up to $600, through [id number]:  - aspirin (with prescription), atorvastatin, Jardiance, fenofibrate, hydrochlorothiazide, Humulin R insulin, lisinopril, metformin, generic insulin pen needles  - Agamatrix Presto blood sugar testing supplies     Diabetes Care:   - Glycemic Goal: <7.0% or as directed by endocrinology. Is not at blood glucose goal but on U500 insulin f/b endocrinology. Current regimen metformin  mg BID, Jardiance 25 mg daily, Humulin R U500 100-90-90 (changed by endo at 12/1/2020 appt).  Using [employer] testing supplies without issues, did provide education on CGM coverage if she would find that beneficial.     Other Considerations:  - Blood Pressure Goal: BP less than 140/90 mmHg due to history of DM: Is at blood pressure goal. On ACEi. eGFR WNL, UACR slightly elevated at last check but previously WNL. - Lipids: Patient has a 10-yr ASCVD risk of less than 20% with DM and is therefore a candidate for moderate-intensity statin therapy based on updated guidelines. Is on high intensity statin and tolerating well. - Smoking status: former smoker    PLAN:  - Consideration(s) for provider: see refill encounter to endocrinology Dr. Gordo Uriostegui  · Update Humulin R U500 insulin Rx - 100-90-90  · Increase pen needle Rx qty - now TID  - DM program gaps identified:   · Initial requirements: Provider Visit for DM (1st) and A1c (1st)   · Ongoing requirements: Provider visit for DM (2nd), ACC/diabetes educator visit (if A1c over 8%), A1c (2nd), Lipid panel, Urine microalbumin, Influenza vaccination for [id number] and Medication adherence over 70%   - Education to patient: as above and   · HHP kojo: already set up  · CGM coverage if desired/beneficial  · Encouraged lifestyle modifications  - Follow up: Endocrinologist for identified gaps or as scheduled below  - Upcoming appointments:   No future appointments.     Regino Mir, PharmD, [facility]  Department, toll free: [phone], option 7

## 2021-01-30 RX ORDER — INSULIN HUMAN 500 [IU]/ML
INJECTION, SOLUTION SUBCUTANEOUS
Qty: 54 ML | Refills: 3 | Status: SHIPPED | OUTPATIENT
Start: 2021-01-30 | End: 2021-07-13 | Stop reason: SDUPTHER

## 2021-04-12 ENCOUNTER — PATIENT MESSAGE (OUTPATIENT)
Dept: FAMILY MEDICINE CLINIC | Age: 46
End: 2021-04-12

## 2021-04-12 DIAGNOSIS — F40.243 FLYING PHOBIA: ICD-10-CM

## 2021-04-12 RX ORDER — DIAZEPAM 2 MG/1
TABLET ORAL
Qty: 8 TABLET | Refills: 0 | Status: SHIPPED | OUTPATIENT
Start: 2021-04-12 | End: 2021-05-10

## 2021-04-12 NOTE — TELEPHONE ENCOUNTER
From: Tanna Brown  To: Charbel Dumont MD  Sent: 4/12/2021 7:24 AM EDT  Subject: Prescription Question    Requesting a prescription for Diazepam 2mg for upcoming flight - anxiety. [facility].  Thank you That should be fine

## 2021-04-13 ENCOUNTER — TELEPHONE (OUTPATIENT)
Dept: FAMILY MEDICINE CLINIC | Age: 46
End: 2021-04-13

## 2021-04-13 NOTE — TELEPHONE ENCOUNTER
[facility] called in regarding the prescription for Diazepam.     They state they need more specific directions on how PT is meant to take it.      Please call back: [phone]

## 2021-04-16 ENCOUNTER — VIRTUAL VISIT (OUTPATIENT)
Dept: ENDOCRINOLOGY | Age: 46
End: 2021-04-16
Payer: COMMERCIAL

## 2021-04-16 DIAGNOSIS — E11.65 UNCONTROLLED TYPE 2 DIABETES MELLITUS WITH HYPERGLYCEMIA (HCC): Primary | ICD-10-CM

## 2021-04-16 PROCEDURE — 99214 OFFICE O/P EST MOD 30 MIN: CPT | Performed by: INTERNAL MEDICINE

## 2021-04-27 DIAGNOSIS — E11.65 UNCONTROLLED TYPE 2 DIABETES MELLITUS WITH HYPERGLYCEMIA (HCC): ICD-10-CM

## 2021-04-28 LAB
ESTIMATED AVERAGE GLUCOSE: 168.6 MG/DL
HBA1C MFR BLD: 7.5 %

## 2021-05-01 DIAGNOSIS — E78.49 OTHER HYPERLIPIDEMIA: ICD-10-CM

## 2021-05-03 RX ORDER — ATORVASTATIN CALCIUM 80 MG/1
TABLET, FILM COATED ORAL
Qty: 90 TABLET | Refills: 0 | Status: SHIPPED | OUTPATIENT
Start: 2021-05-03 | End: 2021-08-02

## 2021-05-08 DIAGNOSIS — I10 ESSENTIAL HYPERTENSION: ICD-10-CM

## 2021-05-08 DIAGNOSIS — E78.49 OTHER HYPERLIPIDEMIA: ICD-10-CM

## 2021-05-10 RX ORDER — FENOFIBRATE 145 MG/1
TABLET, COATED ORAL
Qty: 30 TABLET | Refills: 0 | Status: SHIPPED | OUTPATIENT
Start: 2021-05-10 | End: 2021-06-07

## 2021-05-10 RX ORDER — HYDROCHLOROTHIAZIDE 25 MG/1
TABLET ORAL
Qty: 30 TABLET | Refills: 0 | Status: SHIPPED | OUTPATIENT
Start: 2021-05-10 | End: 2021-06-07

## 2021-05-10 NOTE — TELEPHONE ENCOUNTER
Med sent to pharmacy. Let her know this will be last refill from [place]. Patient should find a new doctor ASAP so she doesn't run out of meds. If she wishes to continue to see Dr Estefania Lopez, she should call her [facility] office to schedule ASAP. This will allow Dr Estefania Lopez to continue to refill meds if this is what she chooses. New office          [address] [place] [address]. [address] [facility] [address]         Phone: [phone]        Please document call and then close encounter.   thanks

## 2021-05-17 ENCOUNTER — OFFICE VISIT (OUTPATIENT)
Dept: PRIMARY CARE CLINIC | Age: 46
End: 2021-05-17
Payer: COMMERCIAL

## 2021-05-17 VITALS
SYSTOLIC BLOOD PRESSURE: 132 MMHG | DIASTOLIC BLOOD PRESSURE: 80 MMHG | OXYGEN SATURATION: 97 % | HEART RATE: 83 BPM | BODY MASS INDEX: 53.92 KG/M2 | HEIGHT: 62 IN | WEIGHT: 293 LBS | TEMPERATURE: 97.6 F

## 2021-05-17 DIAGNOSIS — E83.42 HYPOMAGNESEMIA: ICD-10-CM

## 2021-05-17 DIAGNOSIS — E78.5 DM TYPE 2 WITH DIABETIC DYSLIPIDEMIA (HCC): Primary | ICD-10-CM

## 2021-05-17 DIAGNOSIS — E11.69 DM TYPE 2 WITH DIABETIC DYSLIPIDEMIA (HCC): Primary | ICD-10-CM

## 2021-05-17 DIAGNOSIS — E78.49 OTHER HYPERLIPIDEMIA: ICD-10-CM

## 2021-05-17 DIAGNOSIS — K21.9 GASTROESOPHAGEAL REFLUX DISEASE WITHOUT ESOPHAGITIS: ICD-10-CM

## 2021-05-17 DIAGNOSIS — Z99.89 OSA ON CPAP: ICD-10-CM

## 2021-05-17 DIAGNOSIS — G47.33 OSA ON CPAP: ICD-10-CM

## 2021-05-17 DIAGNOSIS — I10 ESSENTIAL HYPERTENSION, BENIGN: ICD-10-CM

## 2021-05-17 PROCEDURE — 99214 OFFICE O/P EST MOD 30 MIN: CPT | Performed by: FAMILY MEDICINE

## 2021-05-17 PROCEDURE — 3051F HG A1C>EQUAL 7.0%<8.0%: CPT | Performed by: FAMILY MEDICINE

## 2021-05-17 ASSESSMENT — PATIENT HEALTH QUESTIONNAIRE - PHQ9
2. FEELING DOWN, DEPRESSED OR HOPELESS: 0
1. LITTLE INTEREST OR PLEASURE IN DOING THINGS: 0

## 2021-05-17 NOTE — PROGRESS NOTES
PROGRESS NOTE     Sarahy Goss MD  [address] [place] [address] [address] [place] [id number]         Phone: [phone]    Date of Service:  5/17/2021     Patient ID: Yesenia Mcneill is a 50 y.o. female      Assessment / Plan:       1. DM type 2 with diabetic dyslipidemia ([facility] [address]  Recent A1c =7.5  - insulin managed by endocrine, dr Eli Oakley. Pt states her U-500 was increased by him last month at appointment. Pt to continue metformin, jardiance     --pt on ASA, statin and ACEI     Requesting diabetic eye exam records again.  ([facility] Pt states she had this last month    Discussed her weight gain and stressed the importance working on smaller portions, carb counting and exercise. Checking kidney function       2. Hyperlipidemia  Well controlled on meds. Continue (tricor 145, lipitor 80mg).      Pt not fasting today:  Checking FLP and CMP as future once fasting.     3. Hypertension  Borderline high as goal is <130/80. Juan Mhernandez Monk Continue meds. (HCTZ 25, lisinopril 40mg)     Checking CMP     4. SUNDAY on CPAP  Well controlled. Continue CPAP.          5. GERD (gastroesophageal reflux disease)/hypomagnesium  Well controlled, continue ppi  checking magnesium level to ensure it is WNLs. She states she is compliant with magnesium twice daily currently.         HM: Patient declining HIV screen.     6/12/19 normal pap:       Mammogram completed on 11/29/2018 and was normal.  Encouraged patient to schedule once again and gave the number to do so.              Subjective:     CC:  DM2, HTN, HLD, SUNDAY, GERD, hypomagnesium    HPI    51 yo WF presenting with the above chronic medical conditions is here for follow up evaluation. Patient states her U-500 insulin was just increased by her endocrinologist, Dr Dajuan Sanches last month. She states her blood sugars have improved since.   She is compliant with the rest of her medications and CPAP machine    She states her GERD is well controlled. She admits to being more sedentary in her new job and is aware she has gained 11lbs in the last 6 months. She thinks she will be more active as it gets warmer outside.     She recently had a tooth pulled, and states she plans to go back to the dentist soon to have her front teeth fixed (most are rotten)    Lab Results   Component Value Date    LABA1C 7.5 04/27/2021    LABA1C 7.4 11/20/2020    LABA1C 7.7 05/29/2020     Lab Results   Component Value Date    LABMICR 2.90 (H) 11/20/2020    CREATININE 0.9 08/14/2020     Lab Results   Component Value Date    ALT 23 11/20/2020    AST 26 11/20/2020     Lab Results   Component Value Date    CHOL 162 03/02/2020    TRIG 287 (H) 03/02/2020    HDL 29 (L) 03/02/2020    LDLCALC 76 03/02/2020    LDLDIRECT 96 03/06/2018          Lab Results   Component Value Date    MG 1.60 11/20/2020         ROS:  Constitutional:  Negative for activity or appetite change, fever or fatigue  Eyes:  Negative for eye pain or visual changes  Resp:  Negative for SOB, chest tightness, cough  Cardiovascular: Negative for CP, palpitations, L[place]BARDO, orthopnea, PND, LE edema  Gastrointestinal: Negative for abd pain, melena, BRBPR, N/V/D  Endocrine:  Negative for polydipsia and polyuria  :  Negative for dysuria, flank pain or urinary frequency  Musculoskeletal:  Negative for back pain or myalgias  Neuro:  Negative for dizziness or lightheadedness  Psych: negative for depression or anxiety      Vitals:    05/17/21 1613   BP: 132/80   Pulse: 83   Temp: 97.6 °F (36.4 °C)   TempSrc: Temporal   SpO2: 97%   Weight: 297 lb 9.6 oz (135 kg)   Height: 5' 2\" (1.575 m)       Outpatient Medications Marked as Taking for the 5/17/21 encounter (Office Visit) with Unique Kent MD   Medication Sig Dispense Refill    fenofibrate (TRICOR) 145 MG tablet TAKE 1 TABLET BY MOUTH ONE TIME A DAY 30 tablet 0    hydroCHLOROthiazide (HYDRODIURIL) 25 MG tablet TAKE 1 TABLET BY MOUTH ONE TIME A DAY 30 tablet 0    atorvastatin (LIPITOR) 80 MG tablet TAKE 1 TABLET BY MOUTH ONE TIME A DAY 90 tablet 0    Insulin Pen Needle 32G X 4 MM MISC 1 each by Does not apply route 3 times daily 300 each 3    insulin regular human (HUMULIN R U-500 KWIKPEN) 500 UNIT/ML SOPN concentrated injection pen INJECT 100 UNITS BEFORE BREAKFAST, 90 UNITS BEFORE LUNCH, AND 90 UNITS BEFORE SUPPER 54 mL 3    lisinopril (PRINIVIL;ZESTRIL) 40 MG tablet TAKE 1 TABLET BY MOUTH ONE TIME A DAY 90 tablet 0    magnesium oxide (MAG-OX) 400 (241.3 Mg) MG TABS tablet TAKE 1 TABLET BY MOUTH 2 TIMES A  tablet 1    empagliflozin (JARDIANCE) 25 MG tablet Take 25 mg by mouth daily 90 tablet 1    omeprazole (PRILOSEC) 20 MG delayed release capsule Take 1 capsule by mouth 2 times daily 180 capsule 1    blood glucose test strips (AGAMATRIX PRESTO TEST) strip Check sugars /qid 400 strip 3    aspirin 81 MG EC tablet Take 1 tablet by mouth daily 100 tablet 3    metFORMIN (GLUCOPHAGE-XR) 500 MG extended release tablet TAKE 1 TABLET BY MOUTH 2 TIMES A  tablet 6    AGAMATRIX ULTRA-THIN LANCETS MISC USE AS DIRECTED 4 TIMES DAILY 400 each 3    medroxyPROGESTERone (DEPO-PROVERA) 150 MG/ML injection Inject 150 mg into the muscle once.                  Objective:   Constitutional:   · Reviewed vitals above  · Well Nourished, well developed, no distress       HENT:  · Normal external nose without lesions  · +lots of dental decay  Neck:  · Symmetric and without masses  · No thyromegaly  Resp:  · Normal effort  · Clear to auscultation bilaterally without rhonchi, wheezing or crackles  Cardiovascular:  · On auscultation, normal S1 and S2 without murmurs, rubs or gallops  · No bruits of bilateral carotids and no JVD  Gastrointestinal:  · Nontender, nondistended, and no masses  · No hepatosplenomegaly  Musculoskeletal:  · Normal Gait  · All extremities without clubbing, cyanosis or edema  Skin:  · No rashes on inspection  · No areas of increased heat or induration on palpation  Psych:  · Normal mood and affect  · Normal insight and judgement

## 2021-05-19 DIAGNOSIS — E78.49 OTHER HYPERLIPIDEMIA: ICD-10-CM

## 2021-05-19 DIAGNOSIS — I10 ESSENTIAL HYPERTENSION, BENIGN: ICD-10-CM

## 2021-05-19 DIAGNOSIS — K21.9 GASTROESOPHAGEAL REFLUX DISEASE WITHOUT ESOPHAGITIS: ICD-10-CM

## 2021-05-19 LAB
A/G RATIO: 1.8 (ref 1.1–2.2)
ALBUMIN SERPL-MCNC: 4.6 G/DL (ref 3.4–5)
ALP BLD-CCNC: 52 U/L (ref 40–129)
ALT SERPL-CCNC: 24 U/L (ref 10–40)
ANION GAP SERPL CALCULATED.3IONS-SCNC: 13 MMOL/L (ref 3–16)
AST SERPL-CCNC: 34 U/L (ref 15–37)
BILIRUB SERPL-MCNC: <0.2 MG/DL (ref 0–1)
BUN BLDV-MCNC: 20 MG/DL (ref 7–20)
CALCIUM SERPL-MCNC: 9.7 MG/DL (ref 8.3–10.6)
CHLORIDE BLD-SCNC: 101 MMOL/L (ref 99–110)
CHOLESTEROL, TOTAL: 177 MG/DL (ref 0–199)
CO2: 23 MMOL/L (ref 21–32)
CREAT SERPL-MCNC: 0.7 MG/DL (ref 0.6–1.1)
GFR AFRICAN AMERICAN: >60
GFR NON-AFRICAN AMERICAN: >60
GLOBULIN: 2.5 G/DL
GLUCOSE BLD-MCNC: 137 MG/DL (ref 70–99)
HDLC SERPL-MCNC: 29 MG/DL (ref 40–60)
LDL CHOLESTEROL CALCULATED: ABNORMAL MG/DL
LDL CHOLESTEROL DIRECT: 93 MG/DL
MAGNESIUM: 1.7 MG/DL (ref 1.8–2.4)
POTASSIUM SERPL-SCNC: 4.4 MMOL/L (ref 3.5–5.1)
SODIUM BLD-SCNC: 137 MMOL/L (ref 136–145)
TOTAL PROTEIN: 7.1 G/DL (ref 6.4–8.2)
TRIGL SERPL-MCNC: 377 MG/DL (ref 0–150)
VLDLC SERPL CALC-MCNC: ABNORMAL MG/DL

## 2021-05-25 ENCOUNTER — HOSPITAL ENCOUNTER (OUTPATIENT)
Dept: MAMMOGRAPHY | Age: 46
Discharge: HOME | End: 2021-05-25
Payer: COMMERCIAL

## 2021-05-25 VITALS — WEIGHT: 290 LBS | HEIGHT: 62 IN | BODY MASS INDEX: 53.37 KG/M2

## 2021-05-25 DIAGNOSIS — Z12.31 ENCOUNTER FOR SCREENING MAMMOGRAM FOR MALIGNANT NEOPLASM OF BREAST: ICD-10-CM

## 2021-05-25 PROCEDURE — 77067 SCR MAMMO BI INCL CAD: CPT

## 2021-05-27 DIAGNOSIS — R92.8 ABNORMAL MAMMOGRAM OF LEFT BREAST: Primary | ICD-10-CM

## 2021-06-01 ENCOUNTER — HOSPITAL ENCOUNTER (OUTPATIENT)
Dept: ULTRASOUND IMAGING | Age: 46
Discharge: HOME | End: 2021-06-01
Payer: COMMERCIAL

## 2021-06-01 DIAGNOSIS — R92.8 ABNORMAL MAMMOGRAM OF LEFT BREAST: ICD-10-CM

## 2021-06-01 PROCEDURE — 76642 ULTRASOUND BREAST LIMITED: CPT

## 2021-06-07 ENCOUNTER — HOSPITAL ENCOUNTER (OUTPATIENT)
Dept: ULTRASOUND IMAGING | Age: 46
Discharge: HOME | End: 2021-06-07
Payer: COMMERCIAL

## 2021-06-07 ENCOUNTER — HOSPITAL ENCOUNTER (OUTPATIENT)
Dept: MAMMOGRAPHY | Age: 46
Discharge: HOME | End: 2021-06-07
Payer: COMMERCIAL

## 2021-06-07 DIAGNOSIS — R92.8 ABNORMAL MAMMOGRAM: ICD-10-CM

## 2021-06-07 DIAGNOSIS — N63.0 BREAST MASS: ICD-10-CM

## 2021-06-07 PROCEDURE — 88341 IMHCHEM/IMCYTCHM EA ADD ANTB: CPT

## 2021-06-07 PROCEDURE — 2709999900 US BREAST BIOPSY W LOC DEVICE 1ST LESION LEFT

## 2021-06-07 PROCEDURE — 88342 IMHCHEM/IMCYTCHM 1ST ANTB: CPT

## 2021-06-07 PROCEDURE — 88360 TUMOR IMMUNOHISTOCHEM/MANUAL: CPT

## 2021-06-07 PROCEDURE — 77065 DX MAMMO INCL CAD UNI: CPT

## 2021-06-07 PROCEDURE — 88305 TISSUE EXAM BY PATHOLOGIST: CPT

## 2021-06-08 ENCOUNTER — TELEPHONE (OUTPATIENT)
Dept: SURGERY | Age: 46
End: 2021-06-08

## 2021-06-09 ENCOUNTER — OFFICE VISIT (OUTPATIENT)
Dept: SURGERY | Age: 46
End: 2021-06-09
Payer: COMMERCIAL

## 2021-06-09 VITALS
DIASTOLIC BLOOD PRESSURE: 80 MMHG | WEIGHT: 293 LBS | BODY MASS INDEX: 53.92 KG/M2 | RESPIRATION RATE: 18 BRPM | OXYGEN SATURATION: 97 % | SYSTOLIC BLOOD PRESSURE: 122 MMHG | HEIGHT: 62 IN | HEART RATE: 87 BPM

## 2021-06-09 DIAGNOSIS — C50.812 MALIGNANT NEOPLASM OF OVERLAPPING SITES OF LEFT BREAST IN FEMALE, ESTROGEN RECEPTOR NEGATIVE (HCC): Primary | ICD-10-CM

## 2021-06-09 DIAGNOSIS — Z17.1 MALIGNANT NEOPLASM OF OVERLAPPING SITES OF LEFT BREAST IN FEMALE, ESTROGEN RECEPTOR NEGATIVE (HCC): Primary | ICD-10-CM

## 2021-06-09 PROCEDURE — 99205 OFFICE O/P NEW HI 60 MIN: CPT | Performed by: SURGERY

## 2021-06-09 ASSESSMENT — ENCOUNTER SYMPTOMS
COUGH: 0
SHORTNESS OF BREATH: 0
ABDOMINAL PAIN: 0

## 2021-06-09 NOTE — PROGRESS NOTES
Hyperlipidemia     Hypertension     Hypomagnesemia     dx 2015, on PPI. started on replacement    Low grade squamous intraepith lesion on cytologic smear cervix (lgsil)     2018    SUNDAY on CPAP      Past Surgical History:   Procedure Laterality Date    CERVICAL CERCLAGE       SECTION      US BREAST NEEDLE BIOPSY LEFT Left 2021    US BREAST NEEDLE BIOPSY LEFT 2021 [facility] ULTRAS[facility]    WISDOM TOOTH EXTRACTION       Current Outpatient Medications   Medication Sig Dispense Refill    fenofibrate (TRICOR) 145 MG tablet TAKE 1 TABLET BY MOUTH ONE TIME A DAY 90 tablet 0    hydroCHLOROthiazide (HYDRODIURIL) 25 MG tablet TAKE 1 TABLET BY MOUTH ONE TIME A DAY 90 tablet 0    atorvastatin (LIPITOR) 80 MG tablet TAKE 1 TABLET BY MOUTH ONE TIME A DAY 90 tablet 0    Insulin Pen Needle 32G X 4 MM MISC 1 each by Does not apply route 3 times daily 300 each 3    insulin regular human (HUMULIN R U-500 KWIKPEN) 500 UNIT/ML SOPN concentrated injection pen INJECT 100 UNITS BEFORE BREAKFAST, 90 UNITS BEFORE LUNCH, AND 90 UNITS BEFORE SUPPER 54 mL 3    lisinopril (PRINIVIL;ZESTRIL) 40 MG tablet TAKE 1 TABLET BY MOUTH ONE TIME A DAY 90 tablet 0    magnesium oxide (MAG-OX) 400 (241.3 Mg) MG TABS tablet TAKE 1 TABLET BY MOUTH 2 TIMES A  tablet 1    empagliflozin (JARDIANCE) 25 MG tablet Take 25 mg by mouth daily 90 tablet 1    omeprazole (PRILOSEC) 20 MG delayed release capsule Take 1 capsule by mouth 2 times daily 180 capsule 1    blood glucose test strips (AGAMATRIX PRESTO TEST) strip Check sugars /qid 400 strip 3    aspirin 81 MG EC tablet Take 1 tablet by mouth daily 100 tablet 3    metFORMIN (GLUCOPHAGE-XR) 500 MG extended release tablet TAKE 1 TABLET BY MOUTH 2 TIMES A  tablet 6    AGAMATRIX ULTRA-THIN LANCETS MISC USE AS DIRECTED 4 TIMES DAILY 400 each 3    medroxyPROGESTERone (DEPO-PROVERA) 150 MG/ML injection Inject 150 mg into the muscle once.          Current Facility-Administered Medications   Medication Dose Route Frequency Provider Last Rate Last Admin    lidocaine 1 % injection 2.5 mL  2.5 mL Other Once Uyen Hensley PA-C         No Known Allergies  Social History     Socioeconomic History    Marital status: Single     Spouse name: Not on file    Number of children: Not on file    Years of education: Not on file    Highest education level: Not on file   Occupational History    Not on file   Tobacco Use    Smoking status: Former Smoker     Packs/day: 1.00     Years: 18.00     Pack years: 18.00     Types: Cigarettes     Quit date: 9/1/2010     Years since quitting: 10.7    Smokeless tobacco: Never Used   Vaping Use    Vaping Use: Never used   Substance and Sexual Activity    Alcohol use: No    Drug use: No    Sexual activity: Never   Other Topics Concern    Not on file   Social History Narrative    Not on file     Social Determinants of Health     Financial Resource Strain:     Difficulty of Paying Living Expenses:    Food Insecurity:     Worried About Running Out of Food in the Last Year:     Ran Out of Food in the Last Year:    Transportation Needs:     Lack of Transportation (Medical):      Lack of Transportation (Non-Medical):    Physical Activity:     Days of Exercise per Week:     Minutes of Exercise per Session:    Stress:     Feeling of Stress :    Social Connections:     Frequency of Communication with Friends and Family:     Frequency of Social Gatherings with Friends and Family:     Attends Restoration Services:     Active Member of Clubs or Organizations:     Attends Club or Organization Meetings:     Marital Status:    Intimate Partner Violence:     Fear of Current or Ex-Partner:     Emotionally Abused:     Physically Abused:     Sexually Abused:      Family History   Problem Relation Age of Onset    Cancer Mother 62        breast    Breast Cancer Mother 64        BRCA +    High Blood Pressure Father     Diabetes Father     Heart Disease Father     Cancer Other 32        breast    Cancer Maternal Aunt 50        breast    Breast Cancer Maternal Aunt     Heart Disease Paternal Uncle     Stroke Maternal Grandfather     Heart Disease Paternal Aunt     Heart Disease Paternal Uncle     Heart Disease Paternal Uncle     Heart Disease Paternal Uncle     Breast Cancer Maternal Cousin      REVIEW OF SYSTEMS:   Constitutional: Negative for unexpected weight change. Eyes: Negative for visual disturbance. Respiratory: Negative for cough and shortness of breath. Cardiovascular: Negative for chest pain and palpitations. Gastrointestinal: Negative for abdominal pain. Musculoskeletal: Negative for arthralgias and myalgias. Neurological: Negative for headaches. Hematological: Negative for adenopathy. Does not bruise/bleed easily. Psychiatric/Behavioral: Negative for dysphoric mood. The patient is nervous/anxious (breast issue).    I personally reviewed and agree with the above ROS as documented by my medical assistant. PHYSICAL EXAMINATION:   Vitals: /80 (Site: Right Upper Arm, Position: Sitting, Cuff Size: Medium Adult)   Pulse 87   Resp 18   Ht 5' 2\" (1.575 m)   Wt 295 lb 6.4 oz (134 kg)   SpO2 97%   BMI 54.03 kg/m²   General: Well-developed, well-nourished, in no apparent distress. Eyes:  Conjunctivae appear normal. The sclerae are not injected and show no jaundice. Nose, Mouth and Throat:  Patient is wearing a mask. Neck: Supple, without thyromegaly or adenopathy. Respiratory: Normal respiratory effort. Cardiovascular: Regular rate and rhythm. No lower extremity edema. Musculoskeletal: Normal gait and range of motion in all 4 extremities. Psychiatric: Alert and oriented x 3. Appropriate affect and behavior for today's visit. Skin: No visible concerning rashes, lesions, nodules or other skin changes.   Lymphatic System:  No concerning cervical, supraclavicular or axillary lymphadenopathy. Breast Exam:  The breasts are normal in contour. Bra size XL. Right Breast: Examination of the right breast in the upright and supine positions reveal no obvious masses, skin changes, dimpling, or retraction. The nipple and areola are without erosion, edema or ulceration. There is no obvious nipple discharge. There is no concerning axillary adenopathy. Left Breast: Examination of the left breast in the upright and supine positions reveal no obvious masses, skin changes, dimpling, or retraction. The nipple and areola are without erosion, edema or ulceration. There is no obvious nipple discharge. There is no concerning axillary adenopathy. IMAGING: I personally reviewed the breast imaging performed from May and June 20201 and discussed this with the patient. Mammography demonstrates a new 1.8 cm nodule in the left breast at 9:00 and a stable intramammary lymph node identified in the right upper outer breast.  Subsequent left breast ultrasound demonstrates a 1.3 x 1.4 cm mass at 9:00 11 CFN. She was given a BI-RADS 4. Breast density is described as fibroglandular densities. PATHOLOGY:  I reviewed the left breast biopsy pathology from 6/7/2021 with her today as well. This demonstrated an invasive ductal carcinoma, grade 3, ER negative, MA negative, HER2 negative    IMPRESSION/RECOMMENDATION:  Cancer Staging  Malignant neoplasm of overlapping sites of left breast in female, estrogen receptor negative ([facility] [address]  Staging form: Breast, AJCC 8th Edition  - Clinical stage from 6/9/2021: Stage IB (cT1c, cN0, cM0, G3, ER-, MA-, HER2-) - Signed by Isaak Rodriguez MD on 6/9/2021    Anna Mcdonald is a 50 y.o. woman who presents today for a consultation regarding her new diagnosis of left breast cancer in the medial breast.  I reviewed the clinical, imaging, and pathology findings with her today.   We discussed the treatment of breast cancer, which includes a combination of surgical therapy, systemic therapy, and radiation. We discussed her surgical therapy in great detail, and I explained the difference between breast conservation and mastectomy. Based on the clinical and imaging findings, I would consider her an acceptable candidate for attempted breast conservation, for which she is highly motivated. I would also recommend a sentinel node biopsy for axillary staging. I explained the risks, benefits, and alternatives of this procedure including preoperative localization techniques, which include, but are not limited to: anesthetic risk, bleeding, infection, wound complications, sensation changes, unappealing cosmetics, lymphedema, arm numbness, nerve injury, and the need to return to the operating room for a second procedure based on final pathology. She understands and wishes to proceed. I will therefore make arrangements to schedule this in the operating room as soon as possible. The patient was counseled at length about the risks of melvi Covid-19 during their perioperative period and any recovery window from their procedure. The patient was made aware that melvi Covid-19  may worsen their prognosis for recovering from their procedure  and lend to a higher morbidity and/or mortality risk. All material risks, benefits, and reasonable alternatives including postponing the procedure were discussed. The patient does wish to proceed with the procedure at this time. We also discussed adjuvant radiation therapy. I explained to her that radiation therapy is recommended in patients undergoing breast conservation surgery and at times following mastectomy to reduce the risk of local recurrence. I will make arrangements for her to meet with a radiation oncologist post-operatively. With regards to systemic therapy, since she has a hormone negative, HER2 negative breast cancer, systemic therapy is limited to chemotherapy.   We briefly discussed chemotherapy and some potential side effects including hair loss, fatigue, and nausea. We also discussed the need for placement of a port if she proceeds with chemotherapy. I explained the risks, benefits, and alternatives of this procedure which include, but are not limited to: anesthetic risk, bleeding, infection, wound complications, and pneumothorax. I will present her case at our next multi-disciplinary breast cancer conference and place a port at the time of her breast surgery if medical oncology is in agreement that she will require adjuvant chemotherapy. I will make arrangements for her to meet with medical oncology post-operatively (I offered her a pre-operative consultation, but she preferred a post-operative consultation). Finally, we also discussed her personal and family history, the risk of a hereditary cancer syndrome, and the role of genetic counseling and testing. She reports a pathogenic BRCA mutation on her maternal side of the family, although I have not seen these reports to confirm this. The patient herself was negative for a BRCA1 & 2 pathogenic mutation. I have asked that the patient obtain a copy of her mother's or aunt's positive genetic testing results so I can confirm that they do in fact carry a BRCA mutation and not a pathogenic variant in a different gene. (If the latter were true, the patient should undergo expanded panel testing.)    I answered all of her questions thoroughly, and she does seem pleased with this plan of approach. I encouraged her to contact me in the interim if any new questions or concerns arise. Summary:  - left radiofrequency identification tag localized partial mastectomy, left sentinel lymph node biopsy, port-a-catheter placement scheduled on 7/6/2021  - patient will need to see her PCP for surgical clearance within 30 days of surgery  - patient has received a full series of the covid-19 vaccine, the last injection of which was over 14 days ago.   Therefore, we will not obtain pre-operative covid testing as long as she remains asymptomatic  - I will present her case at our next multi-disciplinary tumor board    Russell Lacy MD

## 2021-06-09 NOTE — Clinical Note
I saw Shelly Menon in the office today for her new diagnosis of breast cancer. We have scheduled her for surgery on 7/6/2021.

## 2021-06-28 ENCOUNTER — HOSPITAL ENCOUNTER (OUTPATIENT)
Dept: MAMMOGRAPHY | Age: 46
Discharge: HOME | End: 2021-06-28
Payer: COMMERCIAL

## 2021-06-28 ENCOUNTER — HOSPITAL ENCOUNTER (OUTPATIENT)
Dept: ULTRASOUND IMAGING | Age: 46
Discharge: HOME | End: 2021-06-28
Payer: COMMERCIAL

## 2021-06-28 DIAGNOSIS — Z17.1 MALIGNANT NEOPLASM OF LEFT BREAST IN FEMALE, ESTROGEN RECEPTOR NEGATIVE, UNSPECIFIED SITE OF BREAST (HCC): ICD-10-CM

## 2021-06-28 DIAGNOSIS — C50.912 MALIGNANT NEOPLASM OF LEFT BREAST IN FEMALE, ESTROGEN RECEPTOR NEGATIVE, UNSPECIFIED SITE OF BREAST (HCC): ICD-10-CM

## 2021-06-28 DIAGNOSIS — C50.812 MALIGNANT NEOPLASM OF OVERLAPPING SITES OF LEFT BREAST IN FEMALE, ESTROGEN RECEPTOR NEGATIVE (HCC): ICD-10-CM

## 2021-06-28 DIAGNOSIS — Z17.1 MALIGNANT NEOPLASM OF OVERLAPPING SITES OF LEFT BREAST IN FEMALE, ESTROGEN RECEPTOR NEGATIVE (HCC): ICD-10-CM

## 2021-06-28 PROCEDURE — 2709999900 US PLACE BREAST LOC DEVICE 1ST LESION LEFT

## 2021-06-28 PROCEDURE — 77065 DX MAMMO INCL CAD UNI: CPT

## 2021-06-29 ENCOUNTER — OFFICE VISIT (OUTPATIENT)
Dept: PRIMARY CARE CLINIC | Age: 46
End: 2021-06-29
Payer: COMMERCIAL

## 2021-06-29 VITALS
TEMPERATURE: 97.9 F | HEART RATE: 104 BPM | SYSTOLIC BLOOD PRESSURE: 138 MMHG | DIASTOLIC BLOOD PRESSURE: 82 MMHG | HEIGHT: 62 IN | BODY MASS INDEX: 53.92 KG/M2 | WEIGHT: 293 LBS | OXYGEN SATURATION: 97 %

## 2021-06-29 DIAGNOSIS — E78.49 OTHER HYPERLIPIDEMIA: ICD-10-CM

## 2021-06-29 DIAGNOSIS — Z17.1 MALIGNANT NEOPLASM OF OVERLAPPING SITES OF LEFT BREAST IN FEMALE, ESTROGEN RECEPTOR NEGATIVE (HCC): ICD-10-CM

## 2021-06-29 DIAGNOSIS — Z01.818 PRE-OP EXAMINATION: Primary | ICD-10-CM

## 2021-06-29 DIAGNOSIS — Z79.4 INSULIN DEPENDENT TYPE 2 DIABETES MELLITUS, CONTROLLED (HCC): ICD-10-CM

## 2021-06-29 DIAGNOSIS — C50.812 MALIGNANT NEOPLASM OF OVERLAPPING SITES OF LEFT BREAST IN FEMALE, ESTROGEN RECEPTOR NEGATIVE (HCC): ICD-10-CM

## 2021-06-29 DIAGNOSIS — E11.9 INSULIN DEPENDENT TYPE 2 DIABETES MELLITUS, CONTROLLED (HCC): ICD-10-CM

## 2021-06-29 DIAGNOSIS — E83.42 HYPOMAGNESEMIA: ICD-10-CM

## 2021-06-29 DIAGNOSIS — Z99.89 OSA ON CPAP: ICD-10-CM

## 2021-06-29 DIAGNOSIS — G47.33 OSA ON CPAP: ICD-10-CM

## 2021-06-29 DIAGNOSIS — I10 ESSENTIAL HYPERTENSION, BENIGN: ICD-10-CM

## 2021-06-29 PROCEDURE — 99245 OFF/OP CONSLTJ NEW/EST HI 55: CPT | Performed by: FAMILY MEDICINE

## 2021-06-29 NOTE — PATIENT INSTRUCTIONS
· Stop aspirin 7 days prior    · Per Dr Lata Magdaleno, patient is to decrease her U-500 insulin to 80 units prior to dinner the evening before surgery and HOLD the morning of for     · Stop jardiance and meformin morning prior

## 2021-06-29 NOTE — PROGRESS NOTES
Preoperative Consultation      [address], [address], [address] [facility] [place] [address]         Phone: [address]  YOB: 1975    Date of Service:  6/29/2021    Vitals:    06/29/21 1611   BP: 138/82   Pulse: 104   Temp: 97.9 °F (36.6 °C)   TempSrc: Temporal   SpO2: 97%   Weight: 300 lb (136.1 kg)   Height: 5' 2\" (1.575 m)      Wt Readings from Last 2 Encounters:   06/29/21 300 lb (136.1 kg)   06/09/21 295 lb 6.4 oz (134 kg)     BP Readings from Last 3 Encounters:   06/29/21 138/82   06/09/21 122/80   05/17/21 132/80        Chief Complaint   Patient presents with    Pre-op Exam     Pt is have a LEFT RADIOFREQUENCY INDENTIFICATION TAG LOCALIZED PARTIAL MASTECTOMY, LEFT SENTINEL NODE BIOPSY, PORT PLACEMENT [facility] 7[facility]2021 [facility]       No Known Allergies  Outpatient Medications Marked as Taking for the 6/29/21 encounter (Office Visit) with Mayuri Borrero MD   Medication Sig Dispense Refill    fenofibrate (TRICOR) 145 MG tablet TAKE 1 TABLET BY MOUTH ONE TIME A DAY 90 tablet 0    hydroCHLOROthiazide (HYDRODIURIL) 25 MG tablet TAKE 1 TABLET BY MOUTH ONE TIME A DAY 90 tablet 0    atorvastatin (LIPITOR) 80 MG tablet TAKE 1 TABLET BY MOUTH ONE TIME A DAY 90 tablet 0    Insulin Pen Needle 32G X 4 MM MISC 1 each by Does not apply route 3 times daily 300 each 3    insulin regular human (HUMULIN R U-500 KWIKPEN) 500 UNIT/ML SOPN concentrated injection pen INJECT 100 UNITS BEFORE BREAKFAST, 90 UNITS BEFORE LUNCH, AND 90 UNITS BEFORE SUPPER 54 mL 3    lisinopril (PRINIVIL;ZESTRIL) 40 MG tablet TAKE 1 TABLET BY MOUTH ONE TIME A DAY 90 tablet 0    magnesium oxide (MAG-OX) 400 (241.3 Mg) MG TABS tablet TAKE 1 TABLET BY MOUTH 2 TIMES A  tablet 1    empagliflozin (JARDIANCE) 25 MG tablet Take 25 mg by mouth daily 90 tablet 1    omeprazole (PRILOSEC) 20 MG delayed release capsule Take 1 capsule by mouth 2 times daily 180 capsule 1    blood glucose test strips (AGAMATRIX PRESTO TEST) strip Check sugars /qid 400 strip 3    aspirin 81 MG EC tablet Take 1 tablet by mouth daily 100 tablet 3    metFORMIN (GLUCOPHAGE-XR) 500 MG extended release tablet TAKE 1 TABLET BY MOUTH 2 TIMES A  tablet 6    AGAMATRIX ULTRA-THIN LANCETS MISC USE AS DIRECTED 4 TIMES DAILY 400 each 3    medroxyPROGESTERone (DEPO-PROVERA) 150 MG/ML injection Inject 150 mg into the muscle once.            This patient presents to the office today for a preoperative consultation at the request of surgeon, Dr. Rosalba Campbell, who plans on performing LEFT RADIOFREQUENCY INDENTIFICATION TAG LOCALIZED PARTIAL MASTECTOMY, LEFT SENTINEL NODE BIOPSY, PORT PLACEMENT 7/6/21 at [facility]    Planned anesthesia: General   Known anesthesia problems: None   Bleeding risk: No recent or remote history of abnormal bleeding  Personal or FH of DVT/PE: No    Patient objection to receiving blood products: No    Patient Active Problem List   Diagnosis    Hyperlipidemia    Hypertension    Anxiety    Family history of breast cancer in female    History of tobacco abuse    SUNDAY on CPAP    DM type 2 with diabetic dyslipidemia ([address]    GERD (gastroesophageal reflux disease)    Family history of BRCA gene positive    Hypomagnesemia    Insulin dependent type 2 diabetes mellitus, controlled ([address]    Other allergic rhinitis    Low grade squamous intraepith lesion on cytologic smear cervix (lgsil)    Panic disorder without agoraphobia    Insomnia    Generalized hyperhidrosis    Generalized anxiety disorder    Essential hypertension, benign    Malignant neoplasm of overlapping sites of left breast in female, estrogen receptor negative ([address]       Past Medical History:   Diagnosis Date    Anxiety     DM type 2 with diabetic dyslipidemia ([address]     Family history of BRCA gene positive     mom was BRCA positive, pt has never been tested    Family history of breast cancer in female     GERD (gastroesophageal reflux disease)     History of tobacco abuse     Hyperlipidemia     Hypertension     Hypomagnesemia     dx 2015, on PPI.  started on replacement    Low grade squamous intraepith lesion on cytologic smear cervix (lgsil)     2018    Malignant neoplasm of overlapping sites of left breast in female, estrogen receptor negative ([address]     breast left    SUNDAY on CPAP        Past Surgical History:   Procedure Laterality Date    CERVICAL CERCLAGE       SECTION      US BREAST NEEDLE BIOPSY LEFT Left 2021    US BREAST NEEDLE BIOPSY LEFT 2021 [facility]    US GUIDED NEEDLE LOC OF LEFT BREAST Left 2021    US GUIDED NEEDLE LOC OF LEFT BREAST 2021 Noel Perkins MD [facility] ULTR[facility]    WISDOM TOOTH EXTRACTION         Family History   Problem Relation Age of Onset    Cancer Mother 62        breast    Breast Cancer Mother 64        BRCA +    High Blood Pressure Father     Diabetes Father     Heart Disease Father     Cancer Other 32        breast    Cancer Maternal Aunt 50        breast    Breast Cancer Maternal Aunt     Heart Disease Paternal Uncle     Stroke Maternal Grandfather     Heart Disease Paternal Aunt     Heart Disease Paternal Uncle     Heart Disease Paternal Uncle     Heart Disease Paternal Uncle     Breast Cancer Maternal Cousin          Social History     Socioeconomic History    Marital status: Single     Spouse name: Not on file    Number of children: Not on file    Years of education: Not on file    Highest education level: Not on file   Occupational History    Not on file   Tobacco Use    Smoking status: Former Smoker     Packs/day: 1.00     Years: 18.00     Pack years: 18.00     Types: Cigarettes     Quit date: 2010     Years since quitting: 10.8    Smokeless tobacco: Never Used Review of Systems  Constitutional:  Negative for activity or appetite change, fever or fatigue  HENT:  Negative for congestion, sinus pressure, or rhinorrhea  Eyes:  Negative for eye pain or visual changes  Resp:  Negative for SOB, chest tightness, cough  Cardiovascular: Negative for CP, palpitations, LOMBARDO, orthopnea, PND, LE edema  Gastrointestinal: Negative for abd pain, melena, BRBPR, N/V/D  Endocrine:  Negative for polydipsia and polyuria  :  Negative for dysuria, flank pain or urinary frequency  Musculoskeletal:  Negative for back pain or myalgias  Neuro:  Negative for dizziness or lightheadedness  Psych: negative for depression or anxiety         Physical Exam   Constitutional: She is oriented to person, place, and time. She appears well-developed and well-nourished. No distress. HENT:   Head: Normocephalic and atraumatic. Mouth/Throat: Uvula is midline, oropharynx is clear and moist and mucous membranes are normal.   Eyes: Conjunctivae and EOM are normal. Pupils are equal, round, and reactive to light. Neck: Trachea normal and normal range of motion. Neck supple. No JVD present. Carotid bruit is not present. No mass and no thyromegaly present. Cardiovascular: Normal rate, regular rhythm, normal heart sounds and intact distal pulses. Exam reveals no gallop and no friction rub. No murmur heard. Pulmonary/Chest: Effort normal and breath sounds normal. No respiratory distress. She has no wheezes. She has no rales. Abdominal: Soft. Normal aorta and bowel sounds are normal. She exhibits no distension and no mass. There is no hepatosplenomegaly. No tenderness. Musculoskeletal: She exhibits no edema and no tenderness. Neurological: She is alert and oriented to person, place, and time. She has normal strength. No cranial nerve deficit or sensory deficit. Coordination and gait normal.   Skin: Skin is warm and dry. No rash noted. No erythema. Psychiatric: She has a normal mood and affect.  Her behavior is normal.     EKG Interpretation:  Normal sinus rhythm. Normal ECG  Confirmed by [facility] NAGI CHIN, Gera Levels ([id number]) on 7/2/2021 8:55:24 AM       Assessment:       50 y.o. patient with planned surgery as above. Known risk factors for perioperative complications:     · Uncontrolled DM2 on concentrated insulin: A1c= 7.1  · Med management: checking LFT  · HTN: at goal with HCTZ 25mg and lisinopril 40mg  Checking BMP and as long as WNLs, no need to hold these meds day of surgery  · SUNDAY:  Pt to bring CPAP machine \"just in case\" she has to be admitted afterwards  · Hypomagnesium: due to PPI usage. Checking mag level today (currently taking magox BID    Current medications which may produce withdrawal symptoms if withheld perioperatively: none      Plan:     1. Preoperative workup as follows: ECG, CBC, BMP, LFTs, magnesium  2. Change in medication regimen before surgery:   · Stop aspirin 7 days prior  · Pert Dr Amalia Gomez, patient is to decrease her U-500 insulin to 80 units prior to dinner the evening before surgery and HOLD the morning of for   · Stop jardiance and meformin morning prior  3. Prophylaxis for cardiac events with perioperative beta-blockers: Not indicated    4. Deep vein thrombosis prophylaxis: regimen to be chosen by surgical team  5.  No contraindications to planned surgery

## 2021-06-29 NOTE — PROGRESS NOTES
Entrance of the hospital (do not enter from the lower level of the parking garage). Upon entrance, check in with the  at the main desk on your left. If no one is available at the desk, proceed into the [facility] Waiting Room and go through the door directly into the [facility]. There is a Check-in desk ACROSS from Room 5 (marked with a sign hanging from the ceiling). The phone number for the surgery center is [phone]. 4. Please call [phone] option #2 option #2 if you have not been preregistered yet. On the day of your procedure bring your insurance card and photo ID. You will be registered at your bedside once brought back to your room. 5. DO NOT EAT ANYTHING eight hours prior to your arrival for surgery. May have 8 ounces of water 4 hours prior to your arrival for surgery. NOTE: ALL Gastric, Bariatric and Bowel surgery patients MUST follow their surgeon's instructions. 6. MEDICATIONS    Take the following medications with a SMALL sip of water: omeprazole   Use your usual dose of inhalers the morning of surgery. BRING your rescue inhaler with you to hospital.    Anesthesia does NOT want you to take insulin the morning of surgery. They will control your blood sugar while you are at the hospital. Please contact your ordering physician for instructions regarding your insulin the night before your procedure. If you have an insulin pump, please keep it set on basal rate. 7. Do not swallow water when brushing teeth. No gum, candy, mints or ice chips. Refrain from smoking or at least decrease the amount. 8. Dress in loose, comfortable clothing appropriate for redressing after your procedure. Do not wear jewelry (including body piercings), make-up (especially NO eye make-up), fingernail polish (NO toenail polish if foot/leg surgery), lotion, powders or metal hairclips. 9. Dentures, glasses, or contacts will need to be removed before your procedure.  Bring cases for your glasses, contacts, dentures, or hearing aids to protect them while you are in surgery. 10. If you use a CPAP, please bring it with you on the day of your procedure. 11. We recommend that valuable personal  belongings such as cash, cell phones, e-tablets or jewelry, be left at home during your stay. The hospital will not be responsible for valuables that are not secured in the hospital safe. However, if your insurance requires a co-pay, you may want to bring a method of payment, i.e. Check or credit card, if you wish to pay your co-pay the day of surgery. 12. If you are to stay overnight, you may bring a bag with personal items. Please have any large items you may need brought in by your family after your arrival to your hospital room. 15. If you have a Living Will or Durable Power of , please bring a copy on the day of your procedure. 15. With your permission, one family member may accompany you while you are being prepared for surgery. Once you are ready, additional family members may join you. HOW WE KEEP YOU SAFE and WORK TO PREVENT SURGICAL SITE INFECTIONS:  1. Health care workers should always check your ID bracelet to verify your name and birth date. You will be asked many times to state your name, date of birth, and allergies. 2. Health care workers should always clean their hands with soap or alcohol gel before providing care to you. It is okay to ask anyone if they cleaned their hands before they touch you. 3. You will be actively involved in verifying the type of procedure you are having and ensuring the correct surgical site. This will be confirmed multiple times prior to your procedure. Do NOT eugenia your surgery site UNLESS instructed to by your surgeon. 4. Do not shave or wax for 72 hours prior to procedure near your operative site. Shaving with a razor can irritate your skin and make it easier to develop an infection.  On the day of your procedure, any hair that needs to be removed near the surgical site will be clipped by a healthcare worker using a special clippers designed to avoid skin irritation. 5. When you are in the operating room, your surgical site will be cleansed with a special soap, and in most cases, you will be given an antibiotic before the surgery begins. What to expect AFTER YOUR PROCEDURE:  1. Immediately following your procedure, your will be taken to the PACU for the first phase of your recovery. Your nurse will help you recover from any potential side effects of anesthesia, such as extreme drowsiness, changes in your vital signs or breathing patterns. Nausea, headache, muscle aches, or sore throat may also occur after anesthesia. Your nurse will help you manage these potential side effects. 2. For comfort and safety, arrange to have someone at home with you for the first 24 hours after discharge. 3. You and your family will be given written instructions about your diet, activity, dressing care, medications, and return visits. 4. Once at home, should issues with nausea, pain, or bleeding occur, or should you notice any signs of infection, you should call your surgeon. 5. Always clean your hands before and after caring for your wound. Do not let your family touch your surgery site without cleaning their hands. 6. Narcotic pain medications can cause significant constipation. You may want to add a stool softener to your postoperative medication schedule or speak to your surgeon on how best to manage this SIDE EFFECT. SPECIAL INSTRUCTIONS     Thank you for allowing us to care for you. We strive to exceed your expectations in the delivery of care and service provided to you and your family. If you need to contact the [place] [address] staff for any reason, please call us at [phone]    Instructions reviewed with patient during preadmission testing phone interview.   Erik Lugo RN.6/29/2021 .10:57 AM      ADDITIONAL EDUCATIONAL INFORMATION REVIEWED PER PHONE WITH YOU AND/OR YOUR FAMILY:  No Hibiclens® Bathing Instructions   Yes Antibacterial Soap

## 2021-06-30 ENCOUNTER — HOSPITAL ENCOUNTER (OUTPATIENT)
Age: 46
Discharge: HOME | End: 2021-06-30
Payer: COMMERCIAL

## 2021-06-30 PROCEDURE — 93005 ELECTROCARDIOGRAM TRACING: CPT | Performed by: SURGERY

## 2021-07-02 ENCOUNTER — ANESTHESIA EVENT (OUTPATIENT)
Dept: OPERATING ROOM | Age: 46
End: 2021-07-02
Payer: COMMERCIAL

## 2021-07-02 LAB
EKG ATRIAL RATE: 82 BPM
EKG DIAGNOSIS: NORMAL
EKG P AXIS: 8 DEGREES
EKG P-R INTERVAL: 140 MS
EKG Q-T INTERVAL: 354 MS
EKG QRS DURATION: 70 MS
EKG QTC CALCULATION (BAZETT): 413 MS
EKG R AXIS: 19 DEGREES
EKG T AXIS: 22 DEGREES
EKG VENTRICULAR RATE: 82 BPM

## 2021-07-02 PROCEDURE — 93010 ELECTROCARDIOGRAM REPORT: CPT | Performed by: INTERNAL MEDICINE

## 2021-07-02 NOTE — PROGRESS NOTES
[facility] / [facility] ([facility]) [address], [address]    Acknowledgment of Informed Consent for Surgical or Medical Procedure and Sedation  I agree to allow doctor(s) Simon Gómez and his/her associates or assistants, including residents and/or other qualified medical practitioner to perform the following medical treatment or procedure and to administer or direct the administration of sedation as necessary:  Procedure(s): LEFT RADIOFREQUENCY INDENTIFICATION TAG LOCALIZED PARTIAL MASTECTOMY, LEFT SENTINEL NODE BIOPSY, PORT PLACEMENT    My doctor has explained the following regarding the proposed procedure:   the explanation of the procedure   the benefits of the procedure   the potential problems that might occur during recuperation   the risks and side effects of the procedure which could include but are not limited to severe blood loss, infection, stroke or death   the benefits, risks and side effect of alternative procedures including the consequences of declining this procedure or any alternative procedures   the likelihood of achieving satisfactory results. I acknowledge no guarantee or assurance has been made to me regarding the results. I understand that during the course of this treatment/procedure, unforeseen conditions can occur which require an additional or different procedure. I agree to allow my physician or assistants to perform such extension of the original procedure as they may find necessary. I understand that sedation will often result in temporary impairment of memory and fine motor skills and that sedation can occasionally progress to a state of deep sedation or general anesthesia. I understand the risks of anesthesia for surgery include, but are not limited to, sore throat, hoarseness, injury to face, mouth, or teeth; nausea; headache; injury to blood vessels or nerves; death, brain damage, or paralysis.     I understand that if I have a Limitation of Treatment order in effect during my hospitalization, the order may or may not be in effect during this procedure. I give my doctor permission to give me blood or blood products. I understand that there are risks with receiving blood such as hepatitis, AIDS, fever, or allergic reaction. I acknowledge that the risks, benefits, and alternatives of this treatment have been explained to me and that no express or implied warranty has been given by the hospital, any blood bank, or any person or entity as to the blood or blood components transfused. At the discretion of my doctor, I agree to allow observers, equipment/product representatives and allow photographing, and/or televising of the procedure, provided my name or identity is maintained confidentially. I agree the hospital may dispose of or use for scientific or educational purposes any tissue, fluid, or body parts which may be removed.     ________________________________Date________Time______ am/pm  ([place] [facility])  Patient or Signature of Closest Relative or Legal Guardian    ________________________________Date________Time______am/pm      Page 1 of  1  Witness

## 2021-07-06 ENCOUNTER — ANESTHESIA (OUTPATIENT)
Dept: OPERATING ROOM | Age: 46
End: 2021-07-06
Payer: COMMERCIAL

## 2021-07-06 ENCOUNTER — HOSPITAL ENCOUNTER (OUTPATIENT)
Dept: MAMMOGRAPHY | Age: 46
Discharge: HOME | End: 2021-07-06
Payer: COMMERCIAL

## 2021-07-06 ENCOUNTER — HOSPITAL ENCOUNTER (OUTPATIENT)
Age: 46
Setting detail: OUTPATIENT SURGERY
Discharge: HOME | End: 2021-07-06
Attending: SURGERY | Admitting: SURGERY
Payer: COMMERCIAL

## 2021-07-06 ENCOUNTER — APPOINTMENT (OUTPATIENT)
Dept: GENERAL RADIOLOGY | Age: 46
End: 2021-07-06
Attending: SURGERY
Payer: COMMERCIAL

## 2021-07-06 ENCOUNTER — HOSPITAL ENCOUNTER (OUTPATIENT)
Dept: NUCLEAR MEDICINE | Age: 46
Discharge: HOME | End: 2021-07-06
Payer: COMMERCIAL

## 2021-07-06 VITALS
HEART RATE: 88 BPM | WEIGHT: 290 LBS | BODY MASS INDEX: 53.37 KG/M2 | HEIGHT: 62 IN | SYSTOLIC BLOOD PRESSURE: 101 MMHG | OXYGEN SATURATION: 95 % | RESPIRATION RATE: 15 BRPM | DIASTOLIC BLOOD PRESSURE: 61 MMHG | TEMPERATURE: 97.5 F

## 2021-07-06 VITALS — DIASTOLIC BLOOD PRESSURE: 72 MMHG | OXYGEN SATURATION: 90 % | SYSTOLIC BLOOD PRESSURE: 139 MMHG

## 2021-07-06 DIAGNOSIS — Z17.1 MALIGNANT NEOPLASM OF OVERLAPPING SITES OF LEFT BREAST IN FEMALE, ESTROGEN RECEPTOR NEGATIVE (HCC): ICD-10-CM

## 2021-07-06 DIAGNOSIS — Z17.1 MALIGNANT NEOPLASM OF OVERLAPPING SITES OF LEFT BREAST IN FEMALE, ESTROGEN RECEPTOR NEGATIVE (HCC): Primary | ICD-10-CM

## 2021-07-06 DIAGNOSIS — C50.812 MALIGNANT NEOPLASM OF OVERLAPPING SITES OF LEFT FEMALE BREAST, UNSPECIFIED ESTROGEN RECEPTOR STATUS (HCC): ICD-10-CM

## 2021-07-06 DIAGNOSIS — C50.812 MALIGNANT NEOPLASM OF OVERLAPPING SITES OF LEFT BREAST IN FEMALE, ESTROGEN RECEPTOR NEGATIVE (HCC): Primary | ICD-10-CM

## 2021-07-06 DIAGNOSIS — C50.812 MALIGNANT NEOPLASM OF OVERLAPPING SITES OF LEFT BREAST IN FEMALE, ESTROGEN RECEPTOR NEGATIVE (HCC): ICD-10-CM

## 2021-07-06 DIAGNOSIS — R92.8 ABNORMAL MAMMOGRAM: ICD-10-CM

## 2021-07-06 LAB
GLUCOSE BLD-MCNC: 158 MG/DL (ref 70–99)
GLUCOSE BLD-MCNC: 192 MG/DL (ref 70–99)
PERFORMED ON: ABNORMAL
PERFORMED ON: ABNORMAL
PREGNANCY, URINE: NEGATIVE

## 2021-07-06 PROCEDURE — 76937 US GUIDE VASCULAR ACCESS: CPT | Performed by: SURGERY

## 2021-07-06 PROCEDURE — 88307 TISSUE EXAM BY PATHOLOGIST: CPT

## 2021-07-06 PROCEDURE — 2580000003 HC RX 258: Performed by: ANESTHESIOLOGY

## 2021-07-06 PROCEDURE — 2500000003 HC RX 250 WO HCPCS: Performed by: SURGERY

## 2021-07-06 PROCEDURE — 88360 TUMOR IMMUNOHISTOCHEM/MANUAL: CPT

## 2021-07-06 PROCEDURE — 6360000002 HC RX W HCPCS: Performed by: ANESTHESIOLOGY

## 2021-07-06 PROCEDURE — 3430000000 HC RX DIAGNOSTIC RADIOPHARMACEUTICAL: Performed by: SURGERY

## 2021-07-06 PROCEDURE — 2720000010 HC SURG SUPPLY STERILE: Performed by: SURGERY

## 2021-07-06 PROCEDURE — 78195 LYMPH SYSTEM IMAGING: CPT

## 2021-07-06 PROCEDURE — 6360000002 HC RX W HCPCS

## 2021-07-06 PROCEDURE — 3700000001 HC ADD 15 MINUTES (ANESTHESIA): Performed by: SURGERY

## 2021-07-06 PROCEDURE — 36561 INSERT TUNNELED CV CATH: CPT | Performed by: SURGERY

## 2021-07-06 PROCEDURE — C1788 PORT, INDWELLING, IMP: HCPCS | Performed by: SURGERY

## 2021-07-06 PROCEDURE — 76098 X-RAY EXAM SURGICAL SPECIMEN: CPT

## 2021-07-06 PROCEDURE — 7100000010 HC PHASE II RECOVERY - FIRST 15 MIN: Performed by: SURGERY

## 2021-07-06 PROCEDURE — 7100000001 HC PACU RECOVERY - ADDTL 15 MIN: Performed by: SURGERY

## 2021-07-06 PROCEDURE — 6360000002 HC RX W HCPCS: Performed by: SURGERY

## 2021-07-06 PROCEDURE — 3600000014 HC SURGERY LEVEL 4 ADDTL 15MIN: Performed by: SURGERY

## 2021-07-06 PROCEDURE — 2709999900 HC NON-CHARGEABLE SUPPLY: Performed by: SURGERY

## 2021-07-06 PROCEDURE — 2500000003 HC RX 250 WO HCPCS: Performed by: NURSE ANESTHETIST, CERTIFIED REGISTERED

## 2021-07-06 PROCEDURE — 7100000000 HC PACU RECOVERY - FIRST 15 MIN: Performed by: SURGERY

## 2021-07-06 PROCEDURE — A9541 TC99M SULFUR COLLOID: HCPCS | Performed by: SURGERY

## 2021-07-06 PROCEDURE — 6360000002 HC RX W HCPCS: Performed by: NURSE ANESTHETIST, CERTIFIED REGISTERED

## 2021-07-06 PROCEDURE — 88305 TISSUE EXAM BY PATHOLOGIST: CPT

## 2021-07-06 PROCEDURE — 38525 BIOPSY/REMOVAL LYMPH NODES: CPT | Performed by: SURGERY

## 2021-07-06 PROCEDURE — 3700000000 HC ANESTHESIA ATTENDED CARE: Performed by: SURGERY

## 2021-07-06 PROCEDURE — 71045 X-RAY EXAM CHEST 1 VIEW: CPT

## 2021-07-06 PROCEDURE — 2500000003 HC RX 250 WO HCPCS

## 2021-07-06 PROCEDURE — 77001 FLUOROGUIDE FOR VEIN DEVICE: CPT | Performed by: SURGERY

## 2021-07-06 PROCEDURE — 2500000003 HC RX 250 WO HCPCS: Performed by: ANESTHESIOLOGY

## 2021-07-06 PROCEDURE — 6370000000 HC RX 637 (ALT 250 FOR IP): Performed by: SURGERY

## 2021-07-06 PROCEDURE — 2580000003 HC RX 258: Performed by: SURGERY

## 2021-07-06 PROCEDURE — 3600000004 HC SURGERY LEVEL 4 BASE: Performed by: SURGERY

## 2021-07-06 PROCEDURE — 77001 FLUOROGUIDE FOR VEIN DEVICE: CPT

## 2021-07-06 PROCEDURE — 19301 PARTIAL MASTECTOMY: CPT | Performed by: SURGERY

## 2021-07-06 PROCEDURE — 38900 IO MAP OF SENT LYMPH NODE: CPT | Performed by: SURGERY

## 2021-07-06 PROCEDURE — 84703 CHORIONIC GONADOTROPIN ASSAY: CPT

## 2021-07-06 PROCEDURE — 88342 IMHCHEM/IMCYTCHM 1ST ANTB: CPT

## 2021-07-06 PROCEDURE — 7100000011 HC PHASE II RECOVERY - ADDTL 15 MIN: Performed by: SURGERY

## 2021-07-06 DEVICE — PORT INFUS SGL LUMN ATTCH POLYUR OPN END CATH 8FR POWERPRT: Type: IMPLANTABLE DEVICE | Site: CHEST  WALL | Status: FUNCTIONAL

## 2021-07-06 RX ORDER — ENALAPRILAT 2.5 MG/2ML
1.25 INJECTION INTRAVENOUS
Status: DISCONTINUED | OUTPATIENT
Start: 2021-07-06 | End: 2021-07-06 | Stop reason: HOSPADM

## 2021-07-06 RX ORDER — PROPOFOL 10 MG/ML
INJECTION, EMULSION INTRAVENOUS PRN
Status: DISCONTINUED | OUTPATIENT
Start: 2021-07-06 | End: 2021-07-06 | Stop reason: SDUPTHER

## 2021-07-06 RX ORDER — TRAMADOL HYDROCHLORIDE 50 MG/1
50 TABLET ORAL EVERY 6 HOURS PRN
Qty: 12 TABLET | Refills: 0 | Status: SHIPPED | OUTPATIENT
Start: 2021-07-06 | End: 2021-07-09

## 2021-07-06 RX ORDER — ONDANSETRON 2 MG/ML
4 INJECTION INTRAMUSCULAR; INTRAVENOUS ONCE
Status: COMPLETED | OUTPATIENT
Start: 2021-07-06 | End: 2021-07-06

## 2021-07-06 RX ORDER — LIDOCAINE HCL/PF 100 MG/5ML
SYRINGE (ML) INJECTION PRN
Status: DISCONTINUED | OUTPATIENT
Start: 2021-07-06 | End: 2021-07-06 | Stop reason: SDUPTHER

## 2021-07-06 RX ORDER — ISOSULFAN BLUE 50 MG/5ML
INJECTION, SOLUTION SUBCUTANEOUS PRN
Status: DISCONTINUED | OUTPATIENT
Start: 2021-07-06 | End: 2021-07-06 | Stop reason: ALTCHOICE

## 2021-07-06 RX ORDER — HEPARIN SODIUM (PORCINE) LOCK FLUSH IV SOLN 100 UNIT/ML 100 UNIT/ML
SOLUTION INTRAVENOUS PRN
Status: DISCONTINUED | OUTPATIENT
Start: 2021-07-06 | End: 2021-07-06 | Stop reason: ALTCHOICE

## 2021-07-06 RX ORDER — FENTANYL CITRATE 50 UG/ML
50 INJECTION, SOLUTION INTRAMUSCULAR; INTRAVENOUS EVERY 5 MIN PRN
Status: DISCONTINUED | OUTPATIENT
Start: 2021-07-06 | End: 2021-07-06 | Stop reason: HOSPADM

## 2021-07-06 RX ORDER — LIDOCAINE HYDROCHLORIDE 10 MG/ML
1 INJECTION, SOLUTION EPIDURAL; INFILTRATION; INTRACAUDAL; PERINEURAL
Status: DISCONTINUED | OUTPATIENT
Start: 2021-07-06 | End: 2021-07-06 | Stop reason: HOSPADM

## 2021-07-06 RX ORDER — ROCURONIUM BROMIDE 10 MG/ML
INJECTION, SOLUTION INTRAVENOUS PRN
Status: DISCONTINUED | OUTPATIENT
Start: 2021-07-06 | End: 2021-07-06 | Stop reason: SDUPTHER

## 2021-07-06 RX ORDER — SODIUM CHLORIDE 0.9 % (FLUSH) 0.9 %
10 SYRINGE (ML) INJECTION PRN
Status: DISCONTINUED | OUTPATIENT
Start: 2021-07-06 | End: 2021-07-06 | Stop reason: HOSPADM

## 2021-07-06 RX ORDER — HYDRALAZINE HYDROCHLORIDE 20 MG/ML
5 INJECTION INTRAMUSCULAR; INTRAVENOUS EVERY 5 MIN PRN
Status: DISCONTINUED | OUTPATIENT
Start: 2021-07-06 | End: 2021-07-06 | Stop reason: HOSPADM

## 2021-07-06 RX ORDER — MIDAZOLAM HYDROCHLORIDE 1 MG/ML
INJECTION INTRAMUSCULAR; INTRAVENOUS PRN
Status: DISCONTINUED | OUTPATIENT
Start: 2021-07-06 | End: 2021-07-06 | Stop reason: SDUPTHER

## 2021-07-06 RX ORDER — LABETALOL HYDROCHLORIDE 5 MG/ML
5 INJECTION, SOLUTION INTRAVENOUS EVERY 10 MIN PRN
Status: DISCONTINUED | OUTPATIENT
Start: 2021-07-06 | End: 2021-07-06 | Stop reason: HOSPADM

## 2021-07-06 RX ORDER — TRAMADOL HYDROCHLORIDE 50 MG/1
50 TABLET ORAL
Status: COMPLETED | OUTPATIENT
Start: 2021-07-06 | End: 2021-07-06

## 2021-07-06 RX ORDER — FENTANYL CITRATE 50 UG/ML
INJECTION, SOLUTION INTRAMUSCULAR; INTRAVENOUS PRN
Status: DISCONTINUED | OUTPATIENT
Start: 2021-07-06 | End: 2021-07-06 | Stop reason: SDUPTHER

## 2021-07-06 RX ORDER — LABETALOL HYDROCHLORIDE 5 MG/ML
INJECTION, SOLUTION INTRAVENOUS
Status: COMPLETED
Start: 2021-07-06 | End: 2021-07-06

## 2021-07-06 RX ORDER — HYDROMORPHONE HCL 110MG/55ML
PATIENT CONTROLLED ANALGESIA SYRINGE INTRAVENOUS PRN
Status: DISCONTINUED | OUTPATIENT
Start: 2021-07-06 | End: 2021-07-06 | Stop reason: SDUPTHER

## 2021-07-06 RX ORDER — SODIUM CHLORIDE 0.9 % (FLUSH) 0.9 %
10 SYRINGE (ML) INJECTION EVERY 12 HOURS SCHEDULED
Status: DISCONTINUED | OUTPATIENT
Start: 2021-07-06 | End: 2021-07-06 | Stop reason: HOSPADM

## 2021-07-06 RX ORDER — FENTANYL CITRATE 50 UG/ML
25 INJECTION, SOLUTION INTRAMUSCULAR; INTRAVENOUS EVERY 5 MIN PRN
Status: DISCONTINUED | OUTPATIENT
Start: 2021-07-06 | End: 2021-07-06 | Stop reason: HOSPADM

## 2021-07-06 RX ORDER — SODIUM CHLORIDE, SODIUM LACTATE, POTASSIUM CHLORIDE, CALCIUM CHLORIDE 600; 310; 30; 20 MG/100ML; MG/100ML; MG/100ML; MG/100ML
INJECTION, SOLUTION INTRAVENOUS CONTINUOUS
Status: DISCONTINUED | OUTPATIENT
Start: 2021-07-06 | End: 2021-07-06 | Stop reason: HOSPADM

## 2021-07-06 RX ORDER — SODIUM CHLORIDE 9 MG/ML
25 INJECTION, SOLUTION INTRAVENOUS PRN
Status: DISCONTINUED | OUTPATIENT
Start: 2021-07-06 | End: 2021-07-06 | Stop reason: HOSPADM

## 2021-07-06 RX ORDER — CEFAZOLIN SODIUM 1 G/3ML
INJECTION, POWDER, FOR SOLUTION INTRAMUSCULAR; INTRAVENOUS PRN
Status: DISCONTINUED | OUTPATIENT
Start: 2021-07-06 | End: 2021-07-06 | Stop reason: SDUPTHER

## 2021-07-06 RX ORDER — SUCCINYLCHOLINE/SOD CL,ISO/PF 200MG/10ML
SYRINGE (ML) INTRAVENOUS PRN
Status: DISCONTINUED | OUTPATIENT
Start: 2021-07-06 | End: 2021-07-06 | Stop reason: SDUPTHER

## 2021-07-06 RX ORDER — MAGNESIUM HYDROXIDE 1200 MG/15ML
LIQUID ORAL CONTINUOUS PRN
Status: COMPLETED | OUTPATIENT
Start: 2021-07-06 | End: 2021-07-06

## 2021-07-06 RX ORDER — ONDANSETRON 2 MG/ML
4 INJECTION INTRAMUSCULAR; INTRAVENOUS
Status: DISCONTINUED | OUTPATIENT
Start: 2021-07-06 | End: 2021-07-06 | Stop reason: HOSPADM

## 2021-07-06 RX ORDER — ONDANSETRON 2 MG/ML
INJECTION INTRAMUSCULAR; INTRAVENOUS PRN
Status: DISCONTINUED | OUTPATIENT
Start: 2021-07-06 | End: 2021-07-06 | Stop reason: SDUPTHER

## 2021-07-06 RX ADMIN — PROPOFOL 50 MG: 10 INJECTION, EMULSION INTRAVENOUS at 07:44

## 2021-07-06 RX ADMIN — SODIUM CHLORIDE, POTASSIUM CHLORIDE, SODIUM LACTATE AND CALCIUM CHLORIDE: 600; 310; 30; 20 INJECTION, SOLUTION INTRAVENOUS at 07:22

## 2021-07-06 RX ADMIN — FENTANYL CITRATE 100 MCG: 50 INJECTION, SOLUTION INTRAMUSCULAR; INTRAVENOUS at 07:44

## 2021-07-06 RX ADMIN — ROCURONIUM BROMIDE 10 MG: 10 INJECTION INTRAVENOUS at 08:40

## 2021-07-06 RX ADMIN — LABETALOL HYDROCHLORIDE 5 MG: 5 INJECTION, SOLUTION INTRAVENOUS at 11:16

## 2021-07-06 RX ADMIN — ONDANSETRON 4 MG: 2 INJECTION INTRAMUSCULAR; INTRAVENOUS at 13:49

## 2021-07-06 RX ADMIN — HYDRALAZINE HYDROCHLORIDE 5 MG: 20 INJECTION INTRAMUSCULAR; INTRAVENOUS at 11:51

## 2021-07-06 RX ADMIN — HYDROMORPHONE HYDROCHLORIDE 0.5 MG: 2 INJECTION, SOLUTION INTRAMUSCULAR; INTRAVENOUS; SUBCUTANEOUS at 08:40

## 2021-07-06 RX ADMIN — TRAMADOL HYDROCHLORIDE 50 MG: 50 TABLET, FILM COATED ORAL at 12:18

## 2021-07-06 RX ADMIN — HYDRALAZINE HYDROCHLORIDE 5 MG: 20 INJECTION INTRAMUSCULAR; INTRAVENOUS at 11:34

## 2021-07-06 RX ADMIN — Medication 100 MG: at 07:37

## 2021-07-06 RX ADMIN — MIDAZOLAM HYDROCHLORIDE 2 MG: 2 INJECTION, SOLUTION INTRAMUSCULAR; INTRAVENOUS at 07:29

## 2021-07-06 RX ADMIN — ROCURONIUM BROMIDE 10 MG: 10 INJECTION INTRAVENOUS at 07:58

## 2021-07-06 RX ADMIN — ROCURONIUM BROMIDE 10 MG: 10 INJECTION INTRAVENOUS at 07:46

## 2021-07-06 RX ADMIN — ROCURONIUM BROMIDE 10 MG: 10 INJECTION INTRAVENOUS at 07:37

## 2021-07-06 RX ADMIN — CEFAZOLIN 2000 MG: 10 INJECTION, POWDER, FOR SOLUTION INTRAVENOUS at 07:45

## 2021-07-06 RX ADMIN — ROCURONIUM BROMIDE 10 MG: 10 INJECTION INTRAVENOUS at 09:51

## 2021-07-06 RX ADMIN — PROPOFOL 250 MG: 10 INJECTION, EMULSION INTRAVENOUS at 07:37

## 2021-07-06 RX ADMIN — Medication 160 MG: at 07:37

## 2021-07-06 RX ADMIN — SUGAMMADEX 200 MG: 100 INJECTION, SOLUTION INTRAVENOUS at 10:37

## 2021-07-06 RX ADMIN — HYDROMORPHONE HYDROCHLORIDE 0.5 MG: 2 INJECTION, SOLUTION INTRAMUSCULAR; INTRAVENOUS; SUBCUTANEOUS at 08:24

## 2021-07-06 RX ADMIN — ONDANSETRON 4 MG: 2 INJECTION INTRAMUSCULAR; INTRAVENOUS at 10:36

## 2021-07-06 RX ADMIN — Medication 0.8 MILLICURIE: at 07:23

## 2021-07-06 RX ADMIN — SODIUM CHLORIDE, POTASSIUM CHLORIDE, SODIUM LACTATE AND CALCIUM CHLORIDE: 600; 310; 30; 20 INJECTION, SOLUTION INTRAVENOUS at 08:51

## 2021-07-06 RX ADMIN — LABETALOL HYDROCHLORIDE 5 MG: 5 INJECTION, SOLUTION INTRAVENOUS at 11:29

## 2021-07-06 RX ADMIN — CEFAZOLIN SODIUM 1000 MG: 1 POWDER, FOR SOLUTION INTRAMUSCULAR; INTRAVENOUS at 07:49

## 2021-07-06 ASSESSMENT — PULMONARY FUNCTION TESTS
PIF_VALUE: 38
PIF_VALUE: 36
PIF_VALUE: 34
PIF_VALUE: 17
PIF_VALUE: 34
PIF_VALUE: 36
PIF_VALUE: 30
PIF_VALUE: 34
PIF_VALUE: 35
PIF_VALUE: 34
PIF_VALUE: 35
PIF_VALUE: 36
PIF_VALUE: 36
PIF_VALUE: 35
PIF_VALUE: 36
PIF_VALUE: 35
PIF_VALUE: 34
PIF_VALUE: 33
PIF_VALUE: 35
PIF_VALUE: 36
PIF_VALUE: 0
PIF_VALUE: 33
PIF_VALUE: 40
PIF_VALUE: 38
PIF_VALUE: 36
PIF_VALUE: 34
PIF_VALUE: 34
PIF_VALUE: 35
PIF_VALUE: 33
PIF_VALUE: 35
PIF_VALUE: 34
PIF_VALUE: 36
PIF_VALUE: 38
PIF_VALUE: 34
PIF_VALUE: 37
PIF_VALUE: 34
PIF_VALUE: 36
PIF_VALUE: 34
PIF_VALUE: 34
PIF_VALUE: 35
PIF_VALUE: 33
PIF_VALUE: 39
PIF_VALUE: 35
PIF_VALUE: 0
PIF_VALUE: 35
PIF_VALUE: 38
PIF_VALUE: 33
PIF_VALUE: 34
PIF_VALUE: 35
PIF_VALUE: 38
PIF_VALUE: 37
PIF_VALUE: 0
PIF_VALUE: 39
PIF_VALUE: 3
PIF_VALUE: 34
PIF_VALUE: 38
PIF_VALUE: 34
PIF_VALUE: 34
PIF_VALUE: 0
PIF_VALUE: 35
PIF_VALUE: 34
PIF_VALUE: 38
PIF_VALUE: 30
PIF_VALUE: 34
PIF_VALUE: 34
PIF_VALUE: 30
PIF_VALUE: 35
PIF_VALUE: 34
PIF_VALUE: 35
PIF_VALUE: 36
PIF_VALUE: 35
PIF_VALUE: 33
PIF_VALUE: 35
PIF_VALUE: 35
PIF_VALUE: 10
PIF_VALUE: 34
PIF_VALUE: 36
PIF_VALUE: 35
PIF_VALUE: 34
PIF_VALUE: 35
PIF_VALUE: 40
PIF_VALUE: 34
PIF_VALUE: 35
PIF_VALUE: 36
PIF_VALUE: 35
PIF_VALUE: 36
PIF_VALUE: 36
PIF_VALUE: 35
PIF_VALUE: 34
PIF_VALUE: 35
PIF_VALUE: 30
PIF_VALUE: 34
PIF_VALUE: 34
PIF_VALUE: 38
PIF_VALUE: 35
PIF_VALUE: 33
PIF_VALUE: 35
PIF_VALUE: 12
PIF_VALUE: 35
PIF_VALUE: 34
PIF_VALUE: 39
PIF_VALUE: 35
PIF_VALUE: 34
PIF_VALUE: 35
PIF_VALUE: 37
PIF_VALUE: 35
PIF_VALUE: 7
PIF_VALUE: 34
PIF_VALUE: 34
PIF_VALUE: 36
PIF_VALUE: 34
PIF_VALUE: 35
PIF_VALUE: 33
PIF_VALUE: 35
PIF_VALUE: 11
PIF_VALUE: 33
PIF_VALUE: 40
PIF_VALUE: 35
PIF_VALUE: 3
PIF_VALUE: 36
PIF_VALUE: 34
PIF_VALUE: 33
PIF_VALUE: 35
PIF_VALUE: 40
PIF_VALUE: 36
PIF_VALUE: 1
PIF_VALUE: 35
PIF_VALUE: 34
PIF_VALUE: 34
PIF_VALUE: 40
PIF_VALUE: 42
PIF_VALUE: 2
PIF_VALUE: 33
PIF_VALUE: 34
PIF_VALUE: 35
PIF_VALUE: 34
PIF_VALUE: 36
PIF_VALUE: 36
PIF_VALUE: 34
PIF_VALUE: 35
PIF_VALUE: 1
PIF_VALUE: 34
PIF_VALUE: 34
PIF_VALUE: 35
PIF_VALUE: 35
PIF_VALUE: 34
PIF_VALUE: 33
PIF_VALUE: 34
PIF_VALUE: 35
PIF_VALUE: 11
PIF_VALUE: 34
PIF_VALUE: 34
PIF_VALUE: 32
PIF_VALUE: 39
PIF_VALUE: 34
PIF_VALUE: 34
PIF_VALUE: 31
PIF_VALUE: 35
PIF_VALUE: 36
PIF_VALUE: 34
PIF_VALUE: 35
PIF_VALUE: 30
PIF_VALUE: 38
PIF_VALUE: 35
PIF_VALUE: 36
PIF_VALUE: 36
PIF_VALUE: 4
PIF_VALUE: 35
PIF_VALUE: 35
PIF_VALUE: 34
PIF_VALUE: 35
PIF_VALUE: 37
PIF_VALUE: 33
PIF_VALUE: 40
PIF_VALUE: 35
PIF_VALUE: 34
PIF_VALUE: 11
PIF_VALUE: 11
PIF_VALUE: 35
PIF_VALUE: 35
PIF_VALUE: 0
PIF_VALUE: 34
PIF_VALUE: 34
PIF_VALUE: 1
PIF_VALUE: 34
PIF_VALUE: 39
PIF_VALUE: 35
PIF_VALUE: 37
PIF_VALUE: 35
PIF_VALUE: 30
PIF_VALUE: 35
PIF_VALUE: 34
PIF_VALUE: 34

## 2021-07-06 ASSESSMENT — PAIN DESCRIPTION - ONSET: ONSET: ON-GOING

## 2021-07-06 ASSESSMENT — PAIN DESCRIPTION - PAIN TYPE
TYPE: SURGICAL PAIN
TYPE: ACUTE PAIN

## 2021-07-06 ASSESSMENT — PAIN DESCRIPTION - ORIENTATION
ORIENTATION: LEFT
ORIENTATION: LEFT

## 2021-07-06 ASSESSMENT — PAIN DESCRIPTION - LOCATION
LOCATION: HAND
LOCATION: BREAST

## 2021-07-06 ASSESSMENT — PAIN SCALES - GENERAL
PAINLEVEL_OUTOF10: 3
PAINLEVEL_OUTOF10: 3
PAINLEVEL_OUTOF10: 0
PAINLEVEL_OUTOF10: 3

## 2021-07-06 ASSESSMENT — PAIN DESCRIPTION - FREQUENCY
FREQUENCY: CONTINUOUS
FREQUENCY: INTERMITTENT

## 2021-07-06 ASSESSMENT — PAIN DESCRIPTION - DESCRIPTORS: DESCRIPTORS: ACHING

## 2021-07-06 NOTE — PROGRESS NOTES
Ambulatory Surgery/Procedure Discharge Note    Vitals:    07/06/21 1248   BP: 101/61   Pulse: 88   Resp: 15   Temp: 97.5 °F (36.4 °C)   SpO2: 95%       In: 2070 [I.V.:2070]  Out: 100     Restroom use offered before discharge. Yes    Pain assessment:  level of pain (1-10, 10 severe)  Pain Level: 3, pt states pain is tolerable for discharge. Pt to SDS post left radiofrequency identification tag localized partial mastectomy, left sentinel node biopsy, port placement. Surgical glue clean, dry and intact, ice to sites. Pt c/o surgical pain 3/10, pt medicated per PACU RN. Pt denies nausea at this time, pt tolerating crackers and  PO fluids well.   1330: Pt c/o nausea at this time. Dr. Berhane Trinidad notified with new orders: Administer Zofran IVP 4 mg. Prior to discharge pt denies nausea. Discharge instructions and written prescription given to pt's  and she states understanding of these instructions. Pt states that she is ready for discharge. Patient discharged to home/self care.  Patient discharged via wheel chair by transporter to waiting family/S.O.       7/6/2021 1:18 PM

## 2021-07-06 NOTE — PROGRESS NOTES
Pt to [facility] for left breast surgery. Pt has been vaccinated for Covid. Pt is alert; oriented X 4; speech clear; breathing easily on RA; denies any pain; walks with steady gait without assist.  Urine Hcg is 'negative.'  #20 IV placed in right hand, and accucheck is 192. Pt states last insulin was yesterday at 1300 and took 140 units at that time. Dr. Carlos Lerma gave order for Ancef 2 g IVPB, which went to OR with pt. Pt now in OR.

## 2021-07-06 NOTE — ANESTHESIA POSTPROCEDURE EVALUATION
Department of Anesthesiology  Postprocedure Note    Patient: Kayden Bethea  MRN: [id number]  YOB: 1975  Date of evaluation: 7/6/2021  Time:  12:22 PM     Procedure Summary     Date: 07/06/21 Room / Location: [address] [place] [address]    Anesthesia Start: 6840 Anesthesia Stop: 1058    Procedures:       LEFT RADIOFREQUENCY INDENTIFICATION TAG LOCALIZED PARTIAL MASTECTOMY, LEFT SENTINEL NODE BIOPSY, PORT PLACEMENT (Left Breast)      . (Left Breast) Diagnosis:       Malignant neoplasm of overlapping sites of left female breast, unspecified estrogen receptor status ([facility] [address]      (Malignant neoplasm of overlapping sites of left female breast, unspecified estrogen receptor status ([facility] [address] [C50.812])    Surgeons: Dom Jacome MD Responsible Provider: Harish Garay MD    Anesthesia Type: general ASA Status: 3          Anesthesia Type: general    Najma Phase I: Najma Score: 4    Najma Phase II:      Last vitals: Reviewed and per EMR flowsheets.        Anesthesia Post Evaluation    Patient location during evaluation: PACU  Patient participation: complete - patient participated  Level of consciousness: awake  Airway patency: patent  Nausea & Vomiting: no nausea and no vomiting  Complications: no  Cardiovascular status: hemodynamically stable  Respiratory status: acceptable  Hydration status: stable

## 2021-07-06 NOTE — ANESTHESIA PRE PROCEDURE
Department of Anesthesiology  Preprocedure Note       Name:  Nakul Webster   Age:  50 y.o.  :  1975                                          MRN:  [id number]         Date:  2021      Surgeon: Eileen Lopez):  Kia Hernandez MD    Procedure: Procedure(s):  LEFT RADIOFREQUENCY INDENTIFICATION TAG LOCALIZED PARTIAL MASTECTOMY, LEFT SENTINEL NODE BIOPSY, PORT PLACEMENT  . Luiz Chucho Medications prior to admission:   Prior to Admission medications    Medication Sig Start Date End Date Taking?  Authorizing Provider   fenofibrate (TRICOR) 145 MG tablet TAKE 1 TABLET BY MOUTH ONE TIME A DAY 21  Yes Juana Freeman MD   hydroCHLOROthiazide (HYDRODIURIL) 25 MG tablet TAKE 1 TABLET BY MOUTH ONE TIME A DAY 21  Yes Juana Freeman MD   atorvastatin (LIPITOR) 80 MG tablet TAKE 1 TABLET BY MOUTH ONE TIME A DAY 5/3/21  Yes CONNER Feliz CNP   insulin regular human (HUMULIN R U-500 KWIKPEN) 500 UNIT/ML SOPN concentrated injection pen INJECT 100 UNITS BEFORE BREAKFAST, 90 UNITS BEFORE LUNCH, AND 90 UNITS BEFORE SUPPER 21  Yes Yi Cottrell MD   lisinopril (PRINIVIL;ZESTRIL) 40 MG tablet TAKE 1 TABLET BY MOUTH ONE TIME A DAY 21  Yes CONNER Feliz CNP   magnesium oxide (MAG-OX) 400 (241.3 Mg) MG TABS tablet TAKE 1 TABLET BY MOUTH 2 TIMES A DAY 21  Yes CONNER Feliz CNP   empagliflozin (JARDIANCE) 25 MG tablet Take 25 mg by mouth daily 21  Yes Yi Cottrell MD   omeprazole (PRILOSEC) 20 MG delayed release capsule Take 1 capsule by mouth 2 times daily 20 Yes Elif Chavez MD   aspirin 81 MG EC tablet Take 1 tablet by mouth daily 20  Yes Yi Cottrell MD   metFORMIN (GLUCOPHAGE-XR) 500 MG extended release tablet TAKE 1 TABLET BY MOUTH 2 TIMES A DAY 19  Yes Yi Cottrell MD   Insulin Pen Needle 32G X 4 MM MISC 1 each by Does not apply route 3 times daily 21   Yi Cottrell MD   blood glucose test strips (AGAMATRIX PRESTO TEST) strip Check sugars /qid 7/22/20   Juana Garcia MD   AGAMATRIX ULTRA-THIN LANCETS MISC USE AS DIRECTED 4 TIMES DAILY 3/24/16   Lorraine Huffman MD   medroxyPROGESTERone (DEPO-PROVERA) 150 MG/ML injection Inject 150 mg into the muscle once.       Historical Provider, MD       Current medications:    Current Facility-Administered Medications   Medication Dose Route Frequency Provider Last Rate Last Admin    lactated ringers infusion   Intravenous Continuous Amee Mendoza MD        lactated ringers infusion   Intravenous Continuous Erick Mendez MD        sodium chloride flush 0.9 % injection 10 mL  10 mL Intravenous 2 times per day Erick Mendez MD        sodium chloride flush 0.9 % injection 10 mL  10 mL Intravenous PRN Erick Mendez MD        0.9 % sodium chloride infusion  25 mL Intravenous PRN Erick Mendez MD        lidocaine PF 1 % injection 1 mL  1 mL Intradermal Once PRN Erick Mendez MD           Allergies:  No Known Allergies    Problem List:    Patient Active Problem List   Diagnosis Code    Hyperlipidemia E78.5    Hypertension I10    Anxiety F41.9    Family history of breast cancer in female Z80.2    History of tobacco abuse Z87.891    SUNDAY on CPAP G47.33, Z99.89    DM type 2 with diabetic dyslipidemia ([facility] [address] E11.69, E78.5    GERD (gastroesophageal reflux disease) K21.9    Family history of BRCA gene positive Z84.81    Hypomagnesemia E83.42    Insulin dependent type 2 diabetes mellitus, controlled ([address] E11.9, Z79.4    Other allergic rhinitis J30.89    Low grade squamous intraepith lesion on cytologic smear cervix (lgsil) R87.612    Panic disorder without agoraphobia F41.0    Insomnia G47.00    Generalized hyperhidrosis R61    Generalized anxiety disorder F41.1    Essential hypertension, benign I10    Malignant neoplasm of overlapping sites of left breast in female, estrogen receptor negative ([facility] [address] C50.812, Z17.1       Past Medical kg)   06/29/21 300 lb (136.1 kg)   06/09/21 295 lb 6.4 oz (134 kg)     Body mass index is 53.04 kg/m². CBC:   Lab Results   Component Value Date    WBC 8.7 06/30/2021    RBC 4.83 06/30/2021    HGB 12.5 06/30/2021    HCT 38.5 06/30/2021    MCV 79.6 06/30/2021    RDW 16.9 06/30/2021     06/30/2021       CMP:   Lab Results   Component Value Date     06/30/2021    K 4.3 06/30/2021     06/30/2021    CO2 22 06/30/2021    BUN 17 06/30/2021    CREATININE 0.7 06/30/2021    GFRAA >60 06/30/2021    GFRAA >60 05/14/2013    AGRATIO 1.8 05/19/2021    LABGLOM >60 06/30/2021    GLUCOSE 122 06/30/2021    PROT 7.3 06/30/2021    PROT 8.0 02/18/2013    CALCIUM 9.8 06/30/2021    BILITOT <0.2 06/30/2021    ALKPHOS 59 06/30/2021    AST 23 06/30/2021    ALT 23 06/30/2021       POC Tests: No results for input(s): POCGLU, POCNA, POCK, POCCL, POCBUN, POCHEMO, POCHCT in the last 72 hours. Coags: No results found for: PROTIME, INR, APTT    HCG (If Applicable):   Lab Results   Component Value Date    PREGTESTUR Negative 07/06/2021        ABGs: No results found for: PHART, PO2ART, JHE8POU, QKZ3ETH, BEART, [id number]     Type & Screen (If Applicable):  No results found for: LABABO, LABRH    Drug/Infectious Status (If Applicable):  No results found for: HIV, HEPCAB    COVID-19 Screening (If Applicable): No results found for: COVID19        Anesthesia Evaluation  Patient summary reviewed no history of anesthetic complications:   Airway: Mallampati: III  TM distance: >3 FB   Neck ROM: full  Mouth opening: > = 3 FB Dental:      Comment: Extremely poor dentition with broken rotten teeth     Pulmonary:   (+) sleep apnea: on CPAP,                             Cardiovascular:    (+) hypertension:,                   Neuro/Psych:   (+) psychiatric history (anxiety   panic d/o):            GI/Hepatic/Renal:   (+) GERD:, morbid obesity (BMI 53.2 )          Endo/Other:    (+) Diabetes, .                   ROS comment: Breast Ca dx Alayna 2021 Abdominal:             Vascular: Other Findings:             Anesthesia Plan      general     ASA 3       Induction: intravenous. Anesthetic plan and risks discussed with patient.                       Brittany Chamorro MD   7/6/2021

## 2021-07-06 NOTE — PROGRESS NOTES
PACU Transfer to [facility]    Vitals:    07/06/21 1225   BP: 143/63   Pulse: 94   Resp: 15   Temp: T97.8   SpO2: 100%         Intake/Output Summary (Last 24 hours) at 7/6/2021 1229  Last data filed at 7/6/2021 1225  Gross per 24 hour   Intake 2070 ml   Output 100 ml   Net 1970 ml       Pain assessment:  Pain Level: 3    Patient transferred to care of [facility] RN.    7/6/2021 12:29 PM

## 2021-07-06 NOTE — OP NOTE
Operative Note    Iris Weir  YOB: 1975  MRN: [id number]    PREOPERATIVE DIAGNOSIS  left breast cancer     POSTOPERATIVE DIAGNOSIS  left breast cancer    PROCEDURE  1. Injection of blue dye to the left breast  2. left radiofrequency identification tag localized partial mastectomy  3. left sentinel lymph node biopsy  4. insertion of right internal jugular vein Port-A-Catheter using ultrasound and fluoroscopy guidance    ANESTHESIA  General anesthesia. SURGEON  Kanika Cabrera MD     ASSISTANT  Wilmer Bear MD (Resident)    ESTIMATED BLOOD LOSS  less than 50  ml    COMPLICATIONS  None apparent by completion of procedure    SPECIMENS REMOVED  1. left breast tissue which was marked with a short stitch on the superior margin and a long stitch on the lateral margin. 2. left breast tissue, new medial margin which was marked with a stitch on the new true margin  3. left breast tissue, new inferior margin which was marked with a stitch on the new true margin  4. left breast tissue, new posterior margin which was marked with a stitch on the new true margin  5. left sentinel lymph nodes     FINDINGS  The left breast tissue was excised using radiofrequency identification tag localization. Specimen radiograph demonstrated that the lesion and clip and tag were located within the specimen. The left sentinel lymph nodes were identified and were grossly normal.  The patient had a right internal jugular vein Port-A-Catheter placed and intraoperative fluoroscopy confirmed proper position. IMPLANTS  Power Port Clearvue Single Lumen 8 Fr    POST-OP CONDITION  Stable    DISPOSITION  To recovery room    INDICATION FOR PROCEDURE  Iris Weir is a 50 y.o. woman who was found to have an abnormality in her left breast on imaging.   A core biopsy was performed and revealed an invasive left breast cancer in the medial breast. I felt that she was an acceptable candidate for attempted breast conservation for which she was highly motivated. She presents today for that purpose as well as a sentinel lymph node biopsy for axillary staging. Due to the biology of the tumor, she will be recommended to have adjuvant chemotherapy, which I discussed with her and at our multi-disciplinary breast cancer conference. Thus she also presents today for placement of a right venous Port-A-Catheter to facilitate this. The indications for the planned procedure, along with the potential benefits and risks which include but are not limited to: anesthetic risk, bleeding, infection, wound complications, pneumothorax, sensation changes, unappealing cosmetics, lymphedema, arm numbness, nerve injury, and the need to return to the operating room for a second procedure based on final pathology were reviewed. All questions were answered, and she agrees to proceed. DESCRIPTION OF PROCEDURE   Ralph Menendez underwent an image guided localization procedure preoperatively in the radiology department. She was then brought to the operating room and placed supine on the operating room table with her left arms extended on an arm board and her right arm tucked. She was appropriately positioned and padded. Bilateral sequential compression devices were applied to both lower extremities and a single dose of Ancef was administered intravenously within 60 minutes prior to the incision time. Breast imaging was available in the room. After induction of anesthesia, a timeout procedure was performed. Radioactive colloid was injected into the left breast by the nuclear medicine technician. I then injected 5 ml of isosaulfan blue dye in the left retroareolar and upper outer breast and a 5 minute massage was performed. The patient was prepped and draped in the standard surgical fashion.   We directed our attention to the left breast.  A curvilinear incision was planned based on the location of the greatest radiofrequency identification tag signal.  This was anterolateral to the signal.  The incision was made and carried down through the dermis with electrocautery. No skin was removed. The radiofrequency identification tag signal was then used to create flaps and excise tissue anterior, superior, inferior, medial, and lateral to the signal.  The tissue was then transected from the posterior attachments. Dissection was not carried to the chest wall. The specimen was then marked with a short stitch on the superior margin and a long stitch on the lateral margin and was sent back to the radiology department. Specimen radiograph demonstrated that the lesion and clip and tag were located within the specimen with adequate radiographic margins. Additional shave margins were obtained medially, inferiorly, and posteriorly. Attention was then turned to the wound. Aggressive hemostasis was obtained with electrocautery. The wound was irrigated with copious amounts of sterile saline. Local anesthetic was infiltrated into the soft tissues surrounding the cavity and hemostasis was again confirmed. Clips were placed in the lumpectomy cavity in the area where the tumor had been located. Hemostasis was again confirmed. Attention was then turned to the left axilla. A curvilinear incision was made at the lower edge of the axillary hairline and carried down through the dermis with electrocautery. The subcutaneous tissues were opened and the clavipectoral fascia was incised. Upon entering the axilla, the gamma probe and blue dye were utilized to guide localization of the sentinel nodes. 2 blue and radioactive lymph nodes were identified and excised. Ex vivo counts were 3201 and 560. These were then passed off the field. The residual gamma probe activity within the axillary region was minimal.  The axilla was then assessed for other blue channels/nodes and palpably abnormal lymph nodes.  All blue nodes, non colored nodes extending from colored lymphatics, radioactive and palpably suspicious nodes were removed. Attention was then turned to the wound. Aggressive hemostasis was obtained with electrocautery. The wound was irrigated with copious amounts of sterile saline. Local anesthetic was infiltrated into the soft tissues surrounding the cavity and hemostasis was again confirmed. The deep dermal layer was then reapproximated with interrupted 3-0 Vicryl stitches for both incisions. The skin of both incisions was reapproximated with a running subcuticular using 4-0 Monocryl. Surgical glue dressing was applied. Attention the then turned to the right side. The patient was placed in Trendelenburg position and right internal jugular vein was entered using ultrasound guidance with a finder needle and a wire was placed by Seldinger technique and confirmed in proper position by fluoroscopy. The wire was then secured to the drape and our attention was turned to creating a port pocket on the anterior chest of the same side, 2 finger breadths below the clavicle. An incision was made with a 15 blade and carried down through the subcutaneous tissues using electrocautery. This was taken down onto the chest wall and a pocket was created for the Port-A-Catheter hub using electrocautery and blunt dissection. Local anesthetic was infiltrated into the soft tissues surrounding the cavity and hemostasis was again confirmed. Two 2-0 Prolene stay sutures were placed to either side of the pocket and were affixed to the [address] hub. The catheter was then tunneled from the port pocket to the incision on her neck where the wire entered into the internal jugular vein. The catheter was then marked to the appropriate position of her atriocaval junction and was clipped at that position. The dilator and pull-away sheath mechanism were placed over the wire by Seldinger technique under fluoroscopic guidance.  The dilator and wire were removed and the catheter was placed in the sheath which was then pulled away leaving the catheter in the vein. Again fluoroscopy confirmed proper position. The port was found to flush and aspirate easily. Attention was then turned to the wound. Aggressive hemostasis was attained with electrocautery. The wound was irrigated with copious amounts of sterile saline. The deep dermal layer was then reapproximated with interrupted 3-0 Vicryl stitches. The skin was reapproximated with a running subcuticular using 4-0 Monocryl. Surgical glue dressing was then applied to the incision and the needle entry site. The patient tolerated this procedure well, was awakened and transferred to the recovery room. The instrument, sponge, and needle counts were correct. Her family was notified of intraoperative findings.     Judy London MD 7/6/2021 11:16 AM

## 2021-07-06 NOTE — PROGRESS NOTES
Admitted to pacu at this time. BP and HR elevated. Will treat as needed. Pt without pain or nausea. Arouses easily. Report given by CRNA.

## 2021-07-06 NOTE — H&P
CHIEF COMPLAINT:  New diagnosis of left breast cancer.     HISTORY OF PRESENT ILLNESS:  Ralph Menendez is a 50 y.o. woman who requested that I evaluate her for her new diagnosis of left breast cancer. The patient states that she was undergoing her routine mammogram on 2021 when she was alerted to an abnormality in the left breast.  She underwent additional imaging and ultimately a core biopsy, which confirmed an invasive breast cancer. The patient states that she herself has not noticed any abnormal masses in either breast.  She denies any change in the appearance of her breasts or the skin of her breasts. She denies bilateral nipple discharge. She has no other systemic complaints and otherwise feels well today.      Pertinent Family History: Her mother was diagnosed with breast cancer at age 64 and found to carry a BRCA mutation (currently alive at age 77). She has 3 maternal aunts, Kylie Cabrera, Dora Eckert and Benson. Kylie Cabrera was diagnosed with breast cancer around age 50 and also carries a BRCA mutation (currently alive at age 64). Disha's daughter, Thea Degroot, was diagnosed with breast cancer at age 28.   Kary Myrick, and Thea Degroot are BRCA negative.       Kassandra Mike underwent genetic testing in 2019 through [address] [place] [address] which was negative for a pathogenic mutation in BRCA 1 & 2 (scanned into media).       GYNECOLOGIC HISTORY:  Menarche at age 8    She delivered her first child at age 18, and did not breastfeed  Premenopausal  No hysterectomy  Oral contraceptive use: no and is not currently taking  Hormone use: no and is not currently taking     Past Medical History:   Diagnosis Date    Anxiety     DM type 2 with diabetic dyslipidemia [address] [address]     Family history of BRCA gene positive     mom was BRCA positive, pt has never been tested   [place] Family history of breast cancer in female     GERD (gastroesophageal reflux disease)     History of tobacco abuse     Hyperlipidemia     Hypertension     Hypomagnesemia     dx feb , on PPI.  started on replacement    Low grade squamous intraepith lesion on cytologic smear cervix (lgsil)     2018    Malignant neoplasm of overlapping sites of left breast in female, estrogen receptor negative ([address]     breast left    SUNDAY on CPAP      Past Surgical History:   Procedure Laterality Date    CERVICAL CERCLAGE       SECTION      US BREAST NEEDLE BIOPSY LEFT Left 2021    US BREAST NEEDLE BIOPSY LEFT 2021 [address] [facility] ULTRASOUND    US GUIDED NEEDLE LOC OF LEFT BREAST Left 2021    US GUIDED NEEDLE LOC OF LEFT BREAST 2021 [address] [facility] ULTR[facility]    WISDOM TOOTH EXTRACTION       Current Outpatient Medications   Medication Instructions    AGAMATRIX ULTRA-THIN LANCETS MISC USE AS DIRECTED 4 TIMES DAILY    aspirin 81 mg, Oral, DAILY    atorvastatin (LIPITOR) 80 MG tablet TAKE 1 TABLET BY MOUTH ONE TIME A DAY    blood glucose test strips (AGAMATRIX PRESTO TEST) strip Check sugars /qid    fenofibrate (TRICOR) 145 MG tablet TAKE 1 TABLET BY MOUTH ONE TIME A DAY    hydroCHLOROthiazide (HYDRODIURIL) 25 MG tablet TAKE 1 TABLET BY MOUTH ONE TIME A DAY    Insulin Pen Needle 32G X 4 MM MISC 1 each, Does not apply, 3 TIMES DAILY    insulin regular human (HUMULIN R U-500 KWIKPEN) 500 UNIT/ML SOPN concentrated injection pen INJECT 100 UNITS BEFORE BREAKFAST, 90 UNITS BEFORE LUNCH, AND 90 UNITS BEFORE SUPPER    Jardiance 25 mg, Oral, DAILY    lisinopril (PRINIVIL;ZESTRIL) 40 MG tablet TAKE 1 TABLET BY MOUTH ONE TIME A DAY    magnesium oxide (MAG-OX) 400 (241.3 Mg) MG TABS tablet TAKE 1 TABLET BY MOUTH 2 TIMES A DAY    medroxyPROGESTERone (DEPO-PROVERA) 150 mg, ONCE    metFORMIN (GLUCOPHAGE-XR) 500 MG extended release tablet TAKE 1 TABLET BY MOUTH 2 TIMES A DAY    omeprazole (PRILOSEC) 20 mg, Oral, 2 TIMES DAILY     No Known Allergies       Social History     Socioeconomic History    Marital status: Single     Spouse name: Not on file    Number of children: Not on file    Years of education: Not on file    Highest education level: Not on file   Occupational History    Not on file   Tobacco Use    Smoking status: Former Smoker     Packs/day: 1.00     Years: 18.00     Pack years: 18.00     Types: Cigarettes     Quit date: 9/1/2010     Years since quitting: 10.8    Smokeless tobacco: Never Used   Vaping Use    Vaping Use: Never used   Substance and Sexual Activity    Alcohol use: No    Drug use: No    Sexual activity: Never   Other Topics Concern    Not on file   Social History Narrative    Not on file     Social Determinants of Health     Financial Resource Strain:     Difficulty of Paying Living Expenses:    Food Insecurity:     Worried About Running Out of Food in the Last Year:     [address] in the Last Year:    Transportation Needs:     Lack of Transportation (Medical):      Lack of Transportation (Non-Medical):    Physical Activity:     Days of Exercise per Week:     Minutes of Exercise per Session:    Stress:     Feeling of Stress :    Social Connections:     Frequency of Communication with Friends and Family:     Frequency of Social Gatherings with Friends and Family:     Attends Yarsanism Services:     Active Member of Clubs or Organizations:     Attends Club or Organization Meetings:     Marital Status:    Intimate Partner Violence:     Fear of Current or Ex-Partner:     Emotionally Abused:     Physically Abused:     Sexually Abused:      Family History   Problem Relation Age of Onset    Cancer Mother 62        breast    Breast Cancer Mother 64        BRCA +    High Blood Pressure Father     Diabetes Father     Heart Disease Father     Cancer Other 32        breast    Cancer Maternal Aunt 50        breast    Breast Cancer Maternal Aunt     Heart Disease Paternal Uncle     Stroke Maternal Grandfather     Heart Disease Paternal Aunt     Heart Disease Paternal Uncle     Heart Disease Paternal Uncle     Heart Disease Paternal Uncle     Breast Cancer Maternal Cousin      REVIEW OF SYSTEMS:   Constitutional: Negative for unexpected weight change. Eyes: Negative for visual disturbance. Respiratory: Negative for cough and shortness of breath.    Cardiovascular: Negative for chest pain and palpitations. Gastrointestinal: Negative for abdominal pain. Musculoskeletal: Negative for arthralgias and myalgias. Neurological: Negative for headaches. Hematological: Negative for adenopathy. Does not bruise/bleed easily. Psychiatric/Behavioral: Negative for dysphoric mood. The patient is nervous/anxious (breast issue).         PHYSICAL EXAMINATION:   Vitals: /80 (Site: Right Upper Arm, Position: Sitting, Cuff Size: Medium Adult)   Pulse 87   Resp 18   Ht 5' 2\" (1.575 m)   Wt 295 lb 6.4 oz (134 kg)   SpO2 97%   BMI 54.03 kg/m²   General: Well-developed, well-nourished, in no apparent distress. Eyes:  Conjunctivae appear normal. The sclerae are not injected and show no jaundice. Nose, Mouth and Throat:  Patient is wearing a mask. Neck: Supple, without thyromegaly or adenopathy. Respiratory: Normal respiratory effort. Cardiovascular: Regular rate and rhythm. No lower extremity edema. Musculoskeletal: Normal gait and range of motion in all 4 extremities. Psychiatric: Alert and oriented x 3. Appropriate affect and behavior for today's visit. Skin: No visible concerning rashes, lesions, nodules or other skin changes. Lymphatic System:  No concerning cervical, supraclavicular or axillary lymphadenopathy. Breast Exam:  The breasts are normal in contour. Bra size XL.    Right Breast: Examination of the right breast in the upright and supine positions reveal no obvious masses, skin changes, dimpling, or retraction. The nipple and areola are without erosion, edema or ulceration. There is no obvious nipple discharge.  There is no concerning axillary adenopathy.     Left Breast: Examination of the left breast in the upright and supine positions reveal no obvious masses, skin changes, dimpling, or retraction. The nipple and areola are without erosion, edema or ulceration. There is no obvious nipple discharge. There is no concerning axillary adenopathy.     IMAGING: The breast imaging performed from May and June 20201 - mammography demonstrates a new 1.8 cm nodule in the left breast at 9:00 and a stable intramammary lymph node identified in the right upper outer breast.  Subsequent left breast ultrasound demonstrates a 1.3 x 1.4 cm mass at 9:00 11 CFN. She was given a BI-RADS 4. Breast density is described as fibroglandular densities.     PATHOLOGY: The left breast biopsy pathology from 6/7/2021 with her today as well. This demonstrated an invasive ductal carcinoma, grade 3, ER negative, VT negative, HER2 negative     IMPRESSION/RECOMMENDATION:  Cancer Staging  Malignant neoplasm of overlapping sites of left breast in female, estrogen receptor negative ([facility] [address]  Staging form: Breast, AJCC 8th Edition  - Clinical stage from 6/9/2021: Stage IB (cT1c, cN0, cM0, G3, ER-, VT-, HER2-) - Signed by Riya Vernon MD on 6/9/2021    - left radiofrequency identification tag localized partial mastectomy, left sentinel lymph node biopsy, port-a-catheter placement    The patient was counseled at length about the risks of melvi Covid-19 during their perioperative period and any recovery window from their procedure.  The patient was made aware that melvi Covid-19  may worsen their prognosis for recovering from their procedure  and lend to a higher morbidity and/or mortality risk.  All material risks, benefits, and reasonable alternatives including postponing the procedure were discussed.  The patient does wish to proceed with the procedure at this time.     Alma Delia Small MD 7/6/2021 7:26 AM

## 2021-07-08 ENCOUNTER — TELEPHONE (OUTPATIENT)
Dept: SURGERY | Age: 46
End: 2021-07-08

## 2021-07-08 NOTE — TELEPHONE ENCOUNTER
Called and spoke to patient concerning her medical leave paperwork.  We will fill out and she will pick them up at her follow up appointment on 7/14/21 at 9:45 am.

## 2021-07-12 RX ORDER — EMPAGLIFLOZIN 25 MG/1
TABLET, FILM COATED ORAL
Qty: 90 TABLET | Refills: 1 | Status: SHIPPED | OUTPATIENT
Start: 2021-07-12 | End: 2022-01-18 | Stop reason: SDUPTHER

## 2021-07-12 RX ORDER — METFORMIN HYDROCHLORIDE 500 MG/1
TABLET, EXTENDED RELEASE ORAL
Qty: 180 TABLET | Refills: 1 | Status: SHIPPED | OUTPATIENT
Start: 2021-07-12 | End: 2022-01-18 | Stop reason: SDUPTHER

## 2021-07-12 NOTE — TELEPHONE ENCOUNTER
Medication:   Requested Prescriptions     Pending Prescriptions Disp Refills    JARDIANCE 25 MG tablet [Pharmacy Med Name: Wendelin Stain 25MG TABS] 90 tablet 1     Sig: TAKE 1 TABLET BY MOUTH ONE TIME A DAY    metFORMIN (GLUCOPHAGE-XR) 500 MG extended release tablet [Pharmacy Med Name: METFORMIN HCL ER 500MG TB24] 180 tablet 1     Sig: TAKE 1 TABLET BY MOUTH 2 TIMES A DAY       Last Filled:      Patient Phone Number: [phone] (home) [phone] (work)    Last appt: 4/16/2021   Next appt: 8/16/2021    Last Labs DM:   Lab Results   Component Value Date    LABA1C 7.5 04/27/2021

## 2021-07-13 ENCOUNTER — PATIENT MESSAGE (OUTPATIENT)
Dept: ENDOCRINOLOGY | Age: 46
End: 2021-07-13

## 2021-07-13 DIAGNOSIS — Z79.4 INSULIN DEPENDENT TYPE 2 DIABETES MELLITUS, CONTROLLED (HCC): ICD-10-CM

## 2021-07-13 DIAGNOSIS — E11.9 INSULIN DEPENDENT TYPE 2 DIABETES MELLITUS, CONTROLLED (HCC): ICD-10-CM

## 2021-07-13 RX ORDER — INSULIN HUMAN 500 [IU]/ML
INJECTION, SOLUTION SUBCUTANEOUS
Qty: 30 PEN | Refills: 3 | Status: SHIPPED | OUTPATIENT
Start: 2021-07-13 | End: 2022-02-18 | Stop reason: SDUPTHER

## 2021-07-14 ENCOUNTER — OFFICE VISIT (OUTPATIENT)
Dept: SURGERY | Age: 46
End: 2021-07-14

## 2021-07-14 VITALS
DIASTOLIC BLOOD PRESSURE: 76 MMHG | WEIGHT: 292 LBS | OXYGEN SATURATION: 98 % | SYSTOLIC BLOOD PRESSURE: 128 MMHG | BODY MASS INDEX: 53.73 KG/M2 | HEART RATE: 80 BPM | RESPIRATION RATE: 18 BRPM | HEIGHT: 62 IN

## 2021-07-14 DIAGNOSIS — C50.812 MALIGNANT NEOPLASM OF OVERLAPPING SITES OF LEFT BREAST IN FEMALE, ESTROGEN RECEPTOR NEGATIVE (HCC): Primary | ICD-10-CM

## 2021-07-14 DIAGNOSIS — Z17.1 MALIGNANT NEOPLASM OF OVERLAPPING SITES OF LEFT BREAST IN FEMALE, ESTROGEN RECEPTOR NEGATIVE (HCC): Primary | ICD-10-CM

## 2021-07-14 PROCEDURE — 99024 POSTOP FOLLOW-UP VISIT: CPT | Performed by: SURGERY

## 2021-07-14 RX ORDER — SULFAMETHOXAZOLE AND TRIMETHOPRIM 800; 160 MG/1; MG/1
1 TABLET ORAL 2 TIMES DAILY
Qty: 20 TABLET | Refills: 0 | Status: SHIPPED | OUTPATIENT
Start: 2021-07-14 | End: 2021-07-21 | Stop reason: SDUPTHER

## 2021-07-14 NOTE — PROGRESS NOTES
07/14/21     CHIEF COMPLAINT:  Post-operative visit. HISTORY OF PRESENT ILLNESS:  Marino Fry is a 50 y.o. woman who presented to my office for evaluation of her new diagnosis of left breast cancer. The patient was undergoing her routine mammogram on 5/25/2021 when she was alerted to an abnormality. She underwent additional imaging and ultimately a core biopsy, which confirmed an invasive breast cancer, measuring approximately 1.4 cm on imaging. She presented to discuss further management. Based on the clinical and imaging findings, I did feel that she was an acceptable candidate for attempted breast conservation, for which she was highly motivated. She underwent a left partial mastectomy and sentinel lymph node biopsy as well as right port-a-catheter placement on 7/6/2021 and returns today for her post-operative visit. The patient states that she has done well post-operatively. She denies any problems with the incisions. She denies any redness around the incisions or drainage from the wounds. She has no other systemic complaints and has not had any fevers. PHYSICAL EXAMINATION:     BREAST EXAMINATION: On examination, the incisions are clean, dry, and intact and healing well. The right breast incision has 2-3 cm of erythema surrounding the incision. There is no drainage from the incision. The other 2 incisions have no sign of any infection or drainage from the wounds.       PATHOLOGY:    7/6/2021 left partial mastectomy and sentinel lymph node biopsy: invasive ductal carcinoma, 21 mm, grade 3, negative margins, ER negative, WA negative, HER2 negative, 0 of 2 lymph nodes with evidence of metastatic involvement      IMPRESSION/RECOMMENDATION:    Cancer Staging  Malignant neoplasm of overlapping sites of left breast in female, estrogen receptor negative ([facility] [address]  Staging form: Breast, AJCC 8th Edition  - Clinical stage from 6/9/2021: Stage IB (cT1c, cN0, cM0, G3, ER-, WA-, HER2-) - Signed by Deondre Bonilla MD Amanda on 6/9/2021  - Pathologic stage from 7/14/2021: Stage IIA (pT2, pN0(sn), cM0, G3, ER-, MA-, HER2-) - Signed by Aaron Luciano MD on 7/14/2021    S/p left partial mastectomy and sentinel lymph node biopsy on 7/6/2021    Ayush Vaughn is a 50 y.o. woman who is now status-post left partial mastectomy and sentinel lymph node biopsy for a 2.1 cm invasive ductal carcinoma. I reviewed the pathology with her today and explained that I do consider her surgical therapy to be complete as we were able to achieve negative margins and her sentinel lymph node was negative. She was given a copy of her pathology report. I have prescribed her oral bactrim for a possible cellulitis of the right breast.  I would like to see her back in 1 week to reassess this area. She knows to call sooner if the redness worsens. We also discussed adjuvant therapy. We reviewed that radiation therapy is recommended in patients undergoing breast conservation therapy to reduce the risk of local recurrence. I will arrange for a radiation oncology consultation to discuss this further. With regards to systemic therapy, since she has a hormone negative, HER2 negative breast cancer, systemic therapy is limited to chemotherapy. We briefly discussed chemotherapy and some potential side effects including hair loss, fatigue, and nausea. I will arrange for a medical oncology consultation to discuss this further. I would like to see her back in six months, for a clinical breast exam, and then annually with her mammograms. In the interim, I encouraged her to resume self examinations on a monthly basis and to alert me of any changes. I answered all of her questions thoroughly, and she does seem pleased with this plan of approach. I encouraged her to contact me in the interim if any new questions or concerns arise.     Summary:  - referral to radiation oncology and medical oncology  - f/u in 1 week for wound check, I have prescribed bactrim for this  - f/u in 6 months for clinical exam  - she will be due for bilateral mammogams on 5/26/2022    Negrita Zhao MD, MD

## 2021-07-14 NOTE — Clinical Note
Ann Jackson is recovering well from surgery.  I have referred her to radiation oncology and medical oncology for the next steps in her treatment. Kendrick Ford will plan to see her annually with her mammogram going forward.      [place]

## 2021-07-21 ENCOUNTER — OFFICE VISIT (OUTPATIENT)
Dept: SURGERY | Age: 46
End: 2021-07-21

## 2021-07-21 VITALS
SYSTOLIC BLOOD PRESSURE: 124 MMHG | BODY MASS INDEX: 53.73 KG/M2 | WEIGHT: 292 LBS | RESPIRATION RATE: 18 BRPM | DIASTOLIC BLOOD PRESSURE: 72 MMHG | OXYGEN SATURATION: 99 % | HEIGHT: 62 IN | HEART RATE: 82 BPM

## 2021-07-21 DIAGNOSIS — N61.0 CELLULITIS OF BREAST: Primary | ICD-10-CM

## 2021-07-21 PROCEDURE — 99024 POSTOP FOLLOW-UP VISIT: CPT | Performed by: SURGERY

## 2021-07-21 RX ORDER — SULFAMETHOXAZOLE AND TRIMETHOPRIM 800; 160 MG/1; MG/1
1 TABLET ORAL 2 TIMES DAILY
Qty: 20 TABLET | Refills: 0 | Status: SHIPPED | OUTPATIENT
Start: 2021-07-21 | End: 2021-07-31

## 2021-07-28 ENCOUNTER — OFFICE VISIT (OUTPATIENT)
Dept: SURGERY | Age: 46
End: 2021-07-28

## 2021-07-28 ENCOUNTER — HOSPITAL ENCOUNTER (OUTPATIENT)
Dept: NON INVASIVE DIAGNOSTICS | Age: 46
Discharge: HOME | End: 2021-07-28
Payer: COMMERCIAL

## 2021-07-28 VITALS
SYSTOLIC BLOOD PRESSURE: 128 MMHG | HEIGHT: 62 IN | OXYGEN SATURATION: 96 % | WEIGHT: 292.1 LBS | HEART RATE: 97 BPM | BODY MASS INDEX: 53.75 KG/M2 | RESPIRATION RATE: 18 BRPM | DIASTOLIC BLOOD PRESSURE: 76 MMHG

## 2021-07-28 DIAGNOSIS — N61.0 CELLULITIS OF BREAST: Primary | ICD-10-CM

## 2021-07-28 DIAGNOSIS — Z51.11 ENCOUNTER FOR ANTINEOPLASTIC CHEMOTHERAPY: ICD-10-CM

## 2021-07-28 DIAGNOSIS — C50.812 MALIGNANT NEOPLASM OF OVERLAPPING SITES OF LEFT BREAST IN FEMALE, ESTROGEN RECEPTOR NEGATIVE (HCC): ICD-10-CM

## 2021-07-28 DIAGNOSIS — Z17.1 MALIGNANT NEOPLASM OF OVERLAPPING SITES OF LEFT BREAST IN FEMALE, ESTROGEN RECEPTOR NEGATIVE (HCC): ICD-10-CM

## 2021-07-28 DIAGNOSIS — C50.812 MALIGNANT NEOPLASM OF OVERLAPPING SITES OF LEFT FEMALE BREAST, UNSPECIFIED ESTROGEN RECEPTOR STATUS (HCC): ICD-10-CM

## 2021-07-28 DIAGNOSIS — Z51.11 MAINTENANCE CHEMOTHERAPY: ICD-10-CM

## 2021-07-28 LAB
LV EF: 58 %
LVEF MODALITY: NORMAL

## 2021-07-28 PROCEDURE — 93306 TTE W/DOPPLER COMPLETE: CPT

## 2021-07-28 PROCEDURE — 93356 MYOCRD STRAIN IMG SPCKL TRCK: CPT

## 2021-07-28 PROCEDURE — 99024 POSTOP FOLLOW-UP VISIT: CPT | Performed by: SURGERY

## 2021-07-28 RX ORDER — PROMETHAZINE HYDROCHLORIDE 25 MG/1
TABLET ORAL
COMMUNITY
Start: 2021-07-19 | End: 2022-02-14

## 2021-07-28 RX ORDER — ONDANSETRON 4 MG/1
1 TABLET, FILM COATED ORAL
COMMUNITY
Start: 2021-07-19 | End: 2022-02-14

## 2021-07-28 NOTE — Clinical Note
Her incision looked much better today. Seems like she should be able to start chemo next week as planned.

## 2021-07-29 NOTE — PROGRESS NOTES
07/28/21     CHIEF COMPLAINT:  Post-operative cellulitis     HISTORY OF PRESENT ILLNESS:  Nathaly Browne is a 50 y.o. woman who is status-post left partial mastectomy and sentinel lymph node biopsy as well as right port-a-catheter placement on 7/6/2021 for left breast cancer. She has developed cellulitis around the left breast incision and presents today for a recheck of this area. She has been taking oral Bactrim twice a day as prescribed and has noted the redness has essentially resolved around the incision. She denies any drainage from the incision. She has no other systemic complaints and has not had any fevers. PHYSICAL EXAMINATION:     BREAST EXAMINATION: On examination, the right port and left axillary incisions are clean, dry, and intact and healing well. There is no sign of any infection or drainage from the wounds. The erythema around the left breast incision has essentially resolved. There is no drainage from the wound. There is minimal induration around the incision. IMPRESSION/RECOMMENDATION:    Cancer Staging  Malignant neoplasm of overlapping sites of left breast in female, estrogen receptor negative ([facility] [address]  Staging form: Breast, AJCC 8th Edition  - Clinical stage from 6/9/2021: Stage IB (cT1c, cN0, cM0, G3, ER-, NV-, HER2-) - Signed by Jerica Calvert MD on 6/9/2021  - Pathologic stage from 7/14/2021: Stage IIA (pT2, pN0(sn), cM0, G3, ER-, NV-, HER2-) - Signed by Jerica Calvert MD on 7/14/2021    Nathaly Browne is a 50 y.o. woman who is now status-post left partial mastectomy and sentinel lymph node biopsy for a left breast cancer. She has developed cellulitis of the left breast which is resolving nicely. I explained that the induration will take some time to fully resolve. I have instructed her to complete her course of Bactrim and to notify me if she has any recurrent symptoms. She has plans to start chemotherapy next week.   I encouraged her to contact me prior to this if any new questions or concerns arise. She states that she confirmed with her family members that they do harbor a BRCA mutation (unknown if BRCA1 or BRCA2) which she was tested for and is negative. I will plan to see her for routine follow up in 6 months. In the interim, I encouraged her to resume self examinations on a monthly basis and to alert me of any changes. I answered all of her questions thoroughly, and she does seem pleased with this plan of approach. I encouraged her to contact me in the interim if any new questions or concerns arise.     Summary:   - continue Bactrim  - f/u in 6 months for clinical exam  - she will be due for bilateral mammogams on 5/26/2022    Jesus Vanegas MD

## 2021-07-31 DIAGNOSIS — E78.5 DM TYPE 2 WITH DIABETIC DYSLIPIDEMIA (HCC): ICD-10-CM

## 2021-07-31 DIAGNOSIS — E11.69 DM TYPE 2 WITH DIABETIC DYSLIPIDEMIA (HCC): ICD-10-CM

## 2021-08-02 DIAGNOSIS — E78.49 OTHER HYPERLIPIDEMIA: ICD-10-CM

## 2021-08-02 DIAGNOSIS — K21.9 GASTROESOPHAGEAL REFLUX DISEASE WITHOUT ESOPHAGITIS: ICD-10-CM

## 2021-08-02 RX ORDER — ASPIRIN 81 MG/1
TABLET ORAL
Qty: 100 TABLET | Refills: 3 | Status: SHIPPED | OUTPATIENT
Start: 2021-08-02 | End: 2021-11-01

## 2021-08-02 RX ORDER — ATORVASTATIN CALCIUM 80 MG/1
TABLET, FILM COATED ORAL
Qty: 90 TABLET | Refills: 0 | Status: SHIPPED | OUTPATIENT
Start: 2021-08-02 | End: 2021-11-01 | Stop reason: SDUPTHER

## 2021-08-02 NOTE — TELEPHONE ENCOUNTER
Medication:   Requested Prescriptions     Pending Prescriptions Disp Refills    ASPIRIN ADULT LOW STRENGTH 81 MG EC tablet [Pharmacy Med Name: ASPIRIN ADULT LOW STRENGTH 81 TBEC] 100 tablet 3     Sig: TAKE 1 TABLET BY MOUTH ONE TIME A DAY       Last Filled:      Patient Phone Number: [phone] (home) [phone] (work)    Last appt: Visit date not found   Next appt:08/16/2021  Last Labs DM:   Lab Results   Component Value Date    LABA1C 7.5 04/27/2021     Last Lipid:   Lab Results   Component Value Date    CHOL 177 05/19/2021    TRIG 377 05/19/2021    HDL 29 05/19/2021    HDL 31 02/27/2012    LDLCALC see below 05/19/2021     Last PSA: No results found for: PSA  Last Thyroid:   Lab Results   Component Value Date    TSH 3.94 02/27/2012

## 2021-09-07 DIAGNOSIS — I10 ESSENTIAL HYPERTENSION: ICD-10-CM

## 2021-09-08 RX ORDER — HYDROCHLOROTHIAZIDE 25 MG/1
25 TABLET ORAL DAILY
Qty: 90 TABLET | Refills: 0 | Status: SHIPPED | OUTPATIENT
Start: 2021-09-08 | End: 2021-12-01 | Stop reason: SDUPTHER

## 2021-09-08 NOTE — TELEPHONE ENCOUNTER
Medication:   Requested Prescriptions     Pending Prescriptions Disp Refills    hydroCHLOROthiazide (HYDRODIURIL) 25 MG tablet 90 tablet 0     Sig: Take 1 tablet by mouth daily     Last Filled:  6/7/21    Last appt: 11/20/2020   Next appt: Visit date not found    Last Lipid:   Lab Results   Component Value Date    CHOL 177 05/19/2021    TRIG 377 05/19/2021    HDL 29 05/19/2021    HDL 31 02/27/2012    LDLCALC see below 05/19/2021

## 2021-10-29 ENCOUNTER — VIRTUAL VISIT (OUTPATIENT)
Dept: ENDOCRINOLOGY | Age: 46
End: 2021-10-29
Payer: COMMERCIAL

## 2021-10-29 DIAGNOSIS — I10 ESSENTIAL HYPERTENSION: ICD-10-CM

## 2021-10-29 DIAGNOSIS — E11.65 UNCONTROLLED TYPE 2 DIABETES MELLITUS WITH HYPERGLYCEMIA (HCC): Primary | ICD-10-CM

## 2021-10-29 PROCEDURE — 99214 OFFICE O/P EST MOD 30 MIN: CPT | Performed by: INTERNAL MEDICINE

## 2021-10-29 PROCEDURE — 3051F HG A1C>EQUAL 7.0%<8.0%: CPT | Performed by: INTERNAL MEDICINE

## 2021-10-29 RX ORDER — AMITRIPTYLINE HYDROCHLORIDE 50 MG/1
TABLET, FILM COATED ORAL
COMMUNITY
Start: 2021-10-14

## 2021-10-29 NOTE — PROGRESS NOTES
Seen as f/u patient for diabetes        Pursuant to the emergency declaration under the [facility] waiver authority and the [employer] Act, this Virtual  Visit was conducted, with patient's consent, to reduce the patient's risk of exposure to COVID-19 and provide continuity of care for an established patient. Patient was at home. Provider was at home. No one else was involved  Services were provided through a video synchronous discussion virtually to substitute for in-person clinic visit.       Interim:     stable  Dx with breast cancer  Going through chemotherapy  Has steroids with it  Eating less    Diagnosed with Type 2 diabetes mellitus 4-5 yrs ago  Known diabetic complications: None    Current diabetic medications   U-500 pen 115  But doing 80 units if eats less  Taking BID   SSI 5 for 50 >150     Metformin ER  500mg BID  jardiance 25mg    Previous  lantus 110 units HS  Novolog 25 TID + SSI 5 for 50>150  Usually takes 35-40  januvia 100mg  Was on victoza in the past for 2 months  Bydureon    Moderate, uncontrolled    Insulin started 3/13    Intolerance to diabetes medications: yes - Metformin     Last A1c 7.4%<-----7.5%<------ 6.9%<-----7%<------6.6%<------6.6%<----- 6.8%<-----6.3 on 3/17<----6.3 on 12/16<----6.1 on 3/15  <---- 8.5 On 12/13<---- 9.6<---- 8.9 on 5/13<---10.8<---10.9    Prior visit with dietician: Yes   Current diet: on average, 3 meals per day 45gm CHO  Current exercise: No exercise for one month  Current monitoring regimen: home blood tests occ , mainly in evening    Has brought blood glucose log/meter:yes  Home blood sugar records:100s  Any episodes of hypoglycemia? occ    No Hx of CAD , PVD, CVA    Hyperlipidemia: pravastatin 40mg fenofibrate 145   on 10/14-switched to lipitor 80mg LDL 73 on 2/16  LDL 96  HDL 29 on 3/18     LDL 82 on 2/19  Tolerating , no aches  LDL 76 on 3/20  Last eye exam: 3/21  Last foot exam:5/19  Last microalbumin to creatinine ratio: M/C nl on 5/19  ,   35 on 5/13 34 on 9/13  nl on 3/12---> 33.6 on 11/20    HTN: Stable, tolerating Lisinopril 40mg HCTZ 25mg    4/14  CGMS Report   CGMS insertion date 4/29/14 CGMS data collection was performed on 4/29/14 - 5/3/14. Patient provided written diet, activities and medical management for specified period. MAD 3.6 %   Average reading 252 mg/dL   Std Dev 49 mg/dL   % of time <70 mg/dL 0 %   % of time >140 mg/dL 100 %   Number of hypoglycemia episodes noted: 0   CGMS showed daytime and nocturnal hyperglycemia   Impression:   Daytime and nocturnal hyperglycemia . No documentation of U500 injections. See scanned i-Pro download   Recommendation:   Advised patient to reduce carbohydrate intake to no more than 30 grams with large meals. Add Invokana or [place]. Increase Humulin U500 by 5-10% or change Humulin U500 to be delivered via V-Go 20 and IC of 1:6    Review of Systems    Constitutional: Negative for weight loss and malaise/fatigue. Negative for fever   Eyes: Negative for blurred vision. Negative for double vision, photophobia and pain. Respiratory: Negative for cough and sputum production. Cardiovascular: Negative for chest pain, palpitations and leg swelling. Gastrointestinal: Negative for nausea, vomiting and abdominal pain. Genitourinary: Negative for dysuria, urgency and frequency. Musculoskeletal: Negative for back pain. No joint pain  Skin: Negative for itching and rash. Neurological: Negative for dizziness. Negative for tingling, tremors, focal weakness and headaches.  +neuropathy  Endo/Heme/Allergies: see HPI        PHYSICAL EXAMINATION:  [ INSTRUCTIONS:  \"[x]\" Indicates a positive item  \"[]\" Indicates a negative item  -- DELETE ALL ITEMS NOT EXAMINED]  Vital Signs: (As obtained by patient/caregiver or practitioner observation)    Blood pressure-  Heart rate-    Respiratory rate-    Temperature-  Pulse oximetry-     Constitutional Appears well-developed and well-nourished No apparent distress        Mental status  Alert and awake  Oriented to person/place/time  Able to follow commands      Eyes:  EOM    [x]  Normal    Sclera  [x]  Normal           Discharge [x]  None visible      HENT:   [x] Normocephalic, atraumatic. [x] Mouth/Throat: Mucous membranes are moist.     External Ears [x] Normal      Neck: [x] No visualized mass     Pulmonary/Chest: [x] Respiratory effort normal.  [x] No visualized signs of difficulty breathing or respiratory distress             Musculoskeletal:   [x] Normal gait with no signs of ataxia         [x] Normal range of motion of neck              Neurological:        [x] No Facial Asymmetry (Cranial nerve 7 motor function) (limited exam to video visit)          [x] No gaze palsy                Skin:        [x] No significant exanthematous lesions or discoloration noted on facial skin                 Psychiatric:       [x] Normal Affect [x] No Hallucinations            Other pertinent observable physical exam findings-     Due to this being a TeleHealth encounter, evaluation of the following organ systems is limited: Vitals/Constitutional/EENT/Resp/CV/GI//MS/Neuro/Skin/Heme-Lymph-Imm. Services were provided through a video synchronous discussion virtually to substitute for in-person clinic visit.      5/19  Skeletal foot exam is normal, no skin lesions, toenails are normal, 10 g monofilament is 10/10, Dry feet    Lab Reviewed   No components found for: CHLPL  Lab Results   Component Value Date    TRIG 377 (H) 05/19/2021    TRIG 287 (H) 03/02/2020    TRIG 278 (H) 02/27/2019     Lab Results   Component Value Date    HDL 29 (L) 05/19/2021    HDL 29 (L) 03/02/2020    HDL 32 (L) 02/27/2019     Lab Results   Component Value Date    LDLCALC see below 05/19/2021    LDLCALC 76 03/02/2020    LDLCALC 82 02/27/2019     Lab Results   Component Value Date    LABVLDL see below 05/19/2021 LABVLDL 56 02/27/2019    LABVLDL see below 03/06/2018     Lab Results   Component Value Date    LABA1C 7.5 04/27/2021       Assessment:     Rosibel Lyon is a 50 y.o. female with :    1.T2DM:Longstanding, insulin resistant,fairly controlled. Given A1c and insulin requirements ,  switched to U-500 insulin. Added GLP agonist, did not help. On SGLT-2 inhibitor, A1c high, needs self monitoring . Make insulin BID as eating less  Reviewed log, will adjust dose. Lower dose as eating less  2.HTN;At goal per patient  3. HLD:LDL at goal, on lipitor/ fenofibrate  4. Obesity:Recommend aggressive life style and discussed bariatric option  5. SUNDAY: using Cpap machine more now  6. Breast Cancer: Had surgery, on chemotherapy, will have XRT    Plan:       U-500  120/120    80 units if eats less   SSI 5 for 50>150    jardiance 25mg    Metformin ER 500mg BID   Discussed in detail use of U-500. Discussed side effects of hypoglcyemia   Advise to check blood sugar 2 times a day   Patient to send blood sugar log for titration. Advise to exercise regularly. Advise to low simple carbohydrate and protein with each  meal diet. Diabetes Care: recommend yearly eye exam, foot exam and urine microalbumin to   creatinine ratio.      -Hyperlipidemia, LDL goal is <100 mg/dl   -Hypertension:Continue current  -Daily ASA:Not indicated  -Smoking status:Non smoker    She is on U-500 insulin which has a narrow therapeutic index and high risk of complications including severe hypoglycemia

## 2021-11-01 DIAGNOSIS — E11.69 DM TYPE 2 WITH DIABETIC DYSLIPIDEMIA (HCC): ICD-10-CM

## 2021-11-01 DIAGNOSIS — E78.5 DM TYPE 2 WITH DIABETIC DYSLIPIDEMIA (HCC): ICD-10-CM

## 2021-11-01 DIAGNOSIS — E78.49 OTHER HYPERLIPIDEMIA: ICD-10-CM

## 2021-11-01 RX ORDER — ASPIRIN 81 MG/1
TABLET ORAL
Qty: 100 TABLET | Refills: 3 | Status: SHIPPED | OUTPATIENT
Start: 2021-11-01

## 2021-11-01 RX ORDER — ATORVASTATIN CALCIUM 80 MG/1
TABLET, FILM COATED ORAL
Qty: 90 TABLET | Refills: 0 | Status: SHIPPED | OUTPATIENT
Start: 2021-11-01 | End: 2022-02-03 | Stop reason: SDUPTHER

## 2021-11-04 LAB
AVERAGE GLUCOSE: NORMAL
HBA1C MFR BLD: 6.7 %

## 2021-11-15 ENCOUNTER — OFFICE VISIT (OUTPATIENT)
Dept: PRIMARY CARE CLINIC | Age: 46
End: 2021-11-15
Payer: COMMERCIAL

## 2021-11-15 VITALS
DIASTOLIC BLOOD PRESSURE: 82 MMHG | WEIGHT: 264 LBS | SYSTOLIC BLOOD PRESSURE: 122 MMHG | TEMPERATURE: 97.3 F | BODY MASS INDEX: 48.29 KG/M2 | HEART RATE: 94 BPM | OXYGEN SATURATION: 99 %

## 2021-11-15 DIAGNOSIS — Z99.89 OSA ON CPAP: ICD-10-CM

## 2021-11-15 DIAGNOSIS — E78.49 OTHER HYPERLIPIDEMIA: ICD-10-CM

## 2021-11-15 DIAGNOSIS — Z79.4 INSULIN DEPENDENT TYPE 2 DIABETES MELLITUS, CONTROLLED (HCC): ICD-10-CM

## 2021-11-15 DIAGNOSIS — Z12.11 SCREEN FOR COLON CANCER: ICD-10-CM

## 2021-11-15 DIAGNOSIS — K21.9 GASTROESOPHAGEAL REFLUX DISEASE WITHOUT ESOPHAGITIS: ICD-10-CM

## 2021-11-15 DIAGNOSIS — I10 ESSENTIAL HYPERTENSION, BENIGN: ICD-10-CM

## 2021-11-15 DIAGNOSIS — G47.33 OSA ON CPAP: ICD-10-CM

## 2021-11-15 DIAGNOSIS — Z11.4 SCREENING FOR HIV (HUMAN IMMUNODEFICIENCY VIRUS): ICD-10-CM

## 2021-11-15 DIAGNOSIS — E78.5 DM TYPE 2 WITH DIABETIC DYSLIPIDEMIA (HCC): Primary | ICD-10-CM

## 2021-11-15 DIAGNOSIS — E11.9 INSULIN DEPENDENT TYPE 2 DIABETES MELLITUS, CONTROLLED (HCC): ICD-10-CM

## 2021-11-15 DIAGNOSIS — Z11.59 ENCOUNTER FOR HEPATITIS C SCREENING TEST FOR LOW RISK PATIENT: ICD-10-CM

## 2021-11-15 DIAGNOSIS — C50.812 MALIGNANT NEOPLASM OF OVERLAPPING SITES OF LEFT BREAST IN FEMALE, ESTROGEN RECEPTOR NEGATIVE (HCC): ICD-10-CM

## 2021-11-15 DIAGNOSIS — Z17.1 MALIGNANT NEOPLASM OF OVERLAPPING SITES OF LEFT BREAST IN FEMALE, ESTROGEN RECEPTOR NEGATIVE (HCC): ICD-10-CM

## 2021-11-15 DIAGNOSIS — E83.42 HYPOMAGNESEMIA: ICD-10-CM

## 2021-11-15 DIAGNOSIS — E11.69 DM TYPE 2 WITH DIABETIC DYSLIPIDEMIA (HCC): Primary | ICD-10-CM

## 2021-11-15 PROCEDURE — 99214 OFFICE O/P EST MOD 30 MIN: CPT | Performed by: FAMILY MEDICINE

## 2021-11-15 PROCEDURE — 90471 IMMUNIZATION ADMIN: CPT | Performed by: FAMILY MEDICINE

## 2021-11-15 PROCEDURE — 90674 CCIIV4 VAC NO PRSV 0.5 ML IM: CPT | Performed by: FAMILY MEDICINE

## 2021-11-15 PROCEDURE — 3051F HG A1C>EQUAL 7.0%<8.0%: CPT | Performed by: FAMILY MEDICINE

## 2021-11-15 ASSESSMENT — PATIENT HEALTH QUESTIONNAIRE - PHQ9
3. TROUBLE FALLING OR STAYING ASLEEP: 3
6. FEELING BAD ABOUT YOURSELF - OR THAT YOU ARE A FAILURE OR HAVE LET YOURSELF OR YOUR FAMILY DOWN: 1
1. LITTLE INTEREST OR PLEASURE IN DOING THINGS: 1
SUM OF ALL RESPONSES TO PHQ QUESTIONS 1-9: 11
SUM OF ALL RESPONSES TO PHQ QUESTIONS 1-9: 1
4. FEELING TIRED OR HAVING LITTLE ENERGY: 3
2. FEELING DOWN, DEPRESSED OR HOPELESS: 1
SUM OF ALL RESPONSES TO PHQ QUESTIONS 1-9: 11
SUM OF ALL RESPONSES TO PHQ QUESTIONS 1-9: 1
SUM OF ALL RESPONSES TO PHQ QUESTIONS 1-9: 1
8. MOVING OR SPEAKING SO SLOWLY THAT OTHER PEOPLE COULD HAVE NOTICED. OR THE OPPOSITE, BEING SO FIGETY OR RESTLESS THAT YOU HAVE BEEN MOVING AROUND A LOT MORE THAN USUAL: 0
SUM OF ALL RESPONSES TO PHQ9 QUESTIONS 1 & 2: 2
9. THOUGHTS THAT YOU WOULD BE BETTER OFF DEAD, OR OF HURTING YOURSELF: 0
7. TROUBLE CONCENTRATING ON THINGS, SUCH AS READING THE NEWSPAPER OR WATCHING TELEVISION: 1
SUM OF ALL RESPONSES TO PHQ QUESTIONS 1-9: 11
1. LITTLE INTEREST OR PLEASURE IN DOING THINGS: 1
10. IF YOU CHECKED OFF ANY PROBLEMS, HOW DIFFICULT HAVE THESE PROBLEMS MADE IT FOR YOU TO DO YOUR WORK, TAKE CARE OF THINGS AT HOME, OR GET ALONG WITH OTHER PEOPLE: 1
5. POOR APPETITE OR OVEREATING: 1

## 2021-11-15 ASSESSMENT — ANXIETY QUESTIONNAIRES
7. FEELING AFRAID AS IF SOMETHING AWFUL MIGHT HAPPEN: 0
GAD7 TOTAL SCORE: 4
1. FEELING NERVOUS, ANXIOUS, OR ON EDGE: 1
2. NOT BEING ABLE TO STOP OR CONTROL WORRYING: 1
3. WORRYING TOO MUCH ABOUT DIFFERENT THINGS: 1
6. BECOMING EASILY ANNOYED OR IRRITABLE: 0
5. BEING SO RESTLESS THAT IT IS HARD TO SIT STILL: 0
IF YOU CHECKED OFF ANY PROBLEMS ON THIS QUESTIONNAIRE, HOW DIFFICULT HAVE THESE PROBLEMS MADE IT FOR YOU TO DO YOUR WORK, TAKE CARE OF THINGS AT HOME, OR GET ALONG WITH OTHER PEOPLE: SOMEWHAT DIFFICULT
4. TROUBLE RELAXING: 1

## 2021-11-15 NOTE — PROGRESS NOTES
PROGRESS NOTE     Nick Patel MD  [address] [facility]                                  [address], [address], [address] [facility] [address]         Phone: [phone]    Date of Service:  11/15/2021     Patient ID: Aiyana Andres is a 50 y.o. female      Assessment / Plan:       1. DM type 2 with diabetic dyslipidemia ([facility] [address]  Recent A1c =6.7 on 11/4/21. Will abstract into chart  - insulin managed by endocrine, dr Efraín Spaulding. Pt states her U-500. As stated below he recently changed her start sliding scale insulin, as she is often not eating with her chemotherapy. She knows to check her sugars often, and there is no hypoglycemia at this point. Pt to continue metformin, jardiance     --pt on ASA, statin and ACEI     Requesting diabetic eye exam records again.  ([facility] many times, and never received    Checking kidney function       2. Hyperlipidemia  Well controlled on meds. Continue (tricor 145, lipitor 80mg).       3. Hypertension  at goal is <130/80. Chelsea Kevin Continue meds. (HCTZ 25, lisinopril 40mg)     Checking BMP     4. SUNDAY on CPAP  Well controlled. Continue CPAP.          5. GERD (gastroesophageal reflux disease)/hypomagnesium  Well controlled, continue ppi       6. Malignant neoplasm of overlapping sites of left breast in female, estrogen receptor negative ([facility] [address]  Summarized below a status post unilateral mastectomy, and is currently in the middle chemotherapy. Patient finished chemotherapy and then do radiation. She states her prognosis is very good. HM:    HIV and hep C screening ordered today.     6/12/19 normal pap:       colon cancer screening: Referred to GI, but stated okay to wait until finishes with chemotherapy and radiation for breast cancer.     Flu vaccine given today with VIS form.       Subjective:     CC:  Breast cancer left breast (estrogen receptor neg),  DM2, HTN, HLD, SUNDAY, GERD, hypomagnesium    HPI    40-year-old white female here for reevaluation of the above. She underwent a left partial mastectomy and sentinel lymph node biopsy as well as right port-a-catheter placement on 7/6/2021. Was referred to radiation oncology and medical oncology, and told to f/u with Dr Tammy Prater, breast surgeon in January 2022. Bilateral mammograms due on 5/26/22. She has 2 more treatments of chemotherapy and then has to do radiation. Lost 30lbs due to chemo. Is having trouble with appetite. Also having some signs of neuropathy due to the chemotherapy. States overall tolerating it well without significant anxiety or depression or severe side effects. Saw Dr Julienne Heath via VV last month:  Managing her concentrated insulin. Pt on jardiance and metformin as well. No signs of hypoglycemia. She is on SSI now b/c not eating. Wears cpap machine distantly and feels that it is managing her symptoms. She states her GERD is well controlled.         Lab Results   Component Value Date    LABA1C 7.5 04/27/2021    LABA1C 7.4 11/20/2020    LABA1C 7.7 05/29/2020     Lab Results   Component Value Date    LABMICR 2.90 (H) 11/20/2020    CREATININE 0.7 06/30/2021     Lab Results   Component Value Date    ALT 23 06/30/2021    AST 23 06/30/2021     Lab Results   Component Value Date    CHOL 177 05/19/2021    TRIG 377 (H) 05/19/2021    HDL 29 (L) 05/19/2021    LDLCALC see below 05/19/2021    LDLDIRECT 93 05/19/2021          Lab Results   Component Value Date    MG 1.60 06/30/2021         ROS:  Constitutional:  Negative fever or fatigue + appetite change due to chemotherapy  Eyes:  Negative for eye pain or visual changes  Resp:  Negative for SOB, chest tightness, cough  Cardiovascular: Negative for CP, palpitations, LOMBARDO, orthopnea, PND, LE edema  Gastrointestinal: Negative for abd pain, melena, BRBPR, N/V/D  Endocrine:  Negative for polydipsia and polyuria  :  Negative for dysuria, flank pain or urinary frequency  Musculoskeletal:  Negative for back pain or myalgias  Neuro:  Negative for dizziness or lightheadedness  Psych: negative for depression or anxiety      Vitals:    11/15/21 1548   BP: 122/82   Site: Left Upper Arm   Position: Sitting   Pulse: 94   Temp: 97.3 °F (36.3 °C)   TempSrc: Temporal   SpO2: 99%   Weight: 264 lb (119.7 kg)       Outpatient Medications Marked as Taking for the 11/15/21 encounter (Office Visit) with Queenie Ryder MD   Medication Sig Dispense Refill    ASPIRIN ADULT LOW STRENGTH 81 MG EC tablet TAKE 1 TABLET BY MOUTH ONE TIME A  tablet 3    atorvastatin (LIPITOR) 80 MG tablet ` 90 tablet 0    amitriptyline (ELAVIL) 50 MG tablet TAKE 1 TABLET BY MOUTH DAILY      hydroCHLOROthiazide (HYDRODIURIL) 25 MG tablet Take 1 tablet by mouth daily 90 tablet 0    AgaMatrix Ultra-Thin Lancets MISC Check qac and qhs blood sugars 400 each 3    magnesium oxide (MAG-OX) 400 (241.3 Mg) MG TABS tablet TAKE 1 TABLET BY MOUTH 2 TIMES A  tablet 1    promethazine (PHENERGAN) 25 MG tablet TAKE 1 TABLET BY MOUTH EVERY 6 HOURS AS NEEDED FOR NAUSEA OR VOMITING      ondansetron (ZOFRAN) 4 MG tablet Take 1 tablet by mouth      insulin regular human (HUMULIN R U-500 KWIKPEN) 500 UNIT/ML SOPN concentrated injection pen INJECT 140 UNITS BEFORE BREAKFAST, 140 UNITS BEFORE LUNCH,  UNITS BEFORE SUPPER 30 pen 3    JARDIANCE 25 MG tablet TAKE 1 TABLET BY MOUTH ONE TIME A DAY 90 tablet 1    metFORMIN (GLUCOPHAGE-XR) 500 MG extended release tablet TAKE 1 TABLET BY MOUTH 2 TIMES A  tablet 1    lisinopril (PRINIVIL;ZESTRIL) 40 MG tablet TAKE 1 TABLET BY MOUTH ONE TIME A DAY 90 tablet 1    fenofibrate (TRICOR) 145 MG tablet TAKE 1 TABLET BY MOUTH ONE TIME A DAY 90 tablet 1    omeprazole (PRILOSEC) 20 MG delayed release capsule TAKE 1 CAPSULE BY MOUTH 2 TIMES A  capsule 1    Insulin Pen Needle 32G X 4 MM MISC 1 each by Does not apply route 3 times daily 300 each 3    blood glucose test strips (AGAMATRIX PRESTO TEST) strip Check sugars /qid 400 strip 3             Objective:   Constitutional:   · Reviewed vitals above  · +wearing scarf due to hair loss  HENT:  · Normal external nose without lesions  · +lots of dental decay  Neck:  · Symmetric and without masses  · No thyromegaly  Resp:  · Normal effort  · Clear to auscultation bilaterally without rhonchi, wheezing or crackles  Cardiovascular:  · On auscultation, normal S1 and S2 without murmurs, rubs or gallops  · No bruits of bilateral carotids and no JVD  Gastrointestinal:  · Nontender, nondistended, and no masses  · No hepatosplenomegaly  Musculoskeletal:  · Normal Gait  · All extremities without clubbing, cyanosis or edema  Skin:  · No rashes on inspection  · No areas of increased heat or induration on palpation  Psych:  · Normal mood and affect  · Normal insight and judgement

## 2021-11-30 DIAGNOSIS — I10 ESSENTIAL HYPERTENSION: ICD-10-CM

## 2021-11-30 RX ORDER — HYDROCHLOROTHIAZIDE 25 MG/1
25 TABLET ORAL DAILY
Qty: 90 TABLET | Refills: 0 | OUTPATIENT
Start: 2021-11-30

## 2021-12-01 ENCOUNTER — TELEPHONE (OUTPATIENT)
Dept: PRIMARY CARE CLINIC | Age: 46
End: 2021-12-01

## 2021-12-01 DIAGNOSIS — I10 ESSENTIAL HYPERTENSION: ICD-10-CM

## 2021-12-01 RX ORDER — HYDROCHLOROTHIAZIDE 25 MG/1
25 TABLET ORAL DAILY
Qty: 90 TABLET | Refills: 0 | Status: SHIPPED | OUTPATIENT
Start: 2021-12-01 | End: 2022-03-07

## 2021-12-01 RX ORDER — HYDROCHLOROTHIAZIDE 25 MG/1
25 TABLET ORAL DAILY
Qty: 90 TABLET | Refills: 0 | Status: CANCELLED | OUTPATIENT
Start: 2021-12-01

## 2021-12-01 NOTE — TELEPHONE ENCOUNTER
Refills have been requested for the following medications:         hydroCHLOROthiazide (HYDRODIURIL) 25 MG tablet Shaggy Noriega MD]     Preferred pharmacy: [facility], [address] [place] [address] [phone] - F [phone]

## 2021-12-15 RX ORDER — BLOOD SUGAR DIAGNOSTIC
STRIP MISCELLANEOUS
Qty: 400 STRIP | Refills: 3 | Status: SHIPPED | OUTPATIENT
Start: 2021-12-15

## 2022-01-06 RX ORDER — MAGNESIUM OXIDE 400 MG/1
TABLET ORAL
Qty: 180 TABLET | Refills: 1 | Status: SHIPPED | OUTPATIENT
Start: 2022-01-06 | End: 2022-07-05 | Stop reason: SDUPTHER

## 2022-01-10 RX ORDER — METFORMIN HYDROCHLORIDE 500 MG/1
TABLET, EXTENDED RELEASE ORAL
Qty: 180 TABLET | Refills: 1 | OUTPATIENT
Start: 2022-01-10

## 2022-01-10 RX ORDER — EMPAGLIFLOZIN 25 MG/1
TABLET, FILM COATED ORAL
Qty: 90 TABLET | Refills: 1 | OUTPATIENT
Start: 2022-01-10

## 2022-01-10 NOTE — TELEPHONE ENCOUNTER
Medication:   Requested Prescriptions     Pending Prescriptions Disp Refills    empagliflozin (JARDIANCE) 25 MG tablet 90 tablet 1    metFORMIN (GLUCOPHAGE-XR) 500 MG extended release tablet 180 tablet 1       Last Filled:      Patient Phone Number: [phone] (home) [phone] (work)    Last appt: 10/29/2021   Next appt: Visit date not found    Last Labs DM:   Lab Results   Component Value Date    LABA1C 7.5 04/27/2021

## 2022-01-12 ENCOUNTER — PATIENT MESSAGE (OUTPATIENT)
Dept: ENDOCRINOLOGY | Age: 47
End: 2022-01-12

## 2022-01-12 DIAGNOSIS — E11.9 INSULIN DEPENDENT TYPE 2 DIABETES MELLITUS, CONTROLLED (HCC): ICD-10-CM

## 2022-01-12 DIAGNOSIS — Z79.4 INSULIN DEPENDENT TYPE 2 DIABETES MELLITUS, CONTROLLED (HCC): ICD-10-CM

## 2022-01-18 RX ORDER — EMPAGLIFLOZIN 25 MG/1
TABLET, FILM COATED ORAL
Qty: 90 TABLET | Refills: 0 | Status: SHIPPED | OUTPATIENT
Start: 2022-01-18 | End: 2022-02-18 | Stop reason: SDUPTHER

## 2022-01-18 RX ORDER — METFORMIN HYDROCHLORIDE 500 MG/1
TABLET, EXTENDED RELEASE ORAL
Qty: 180 TABLET | Refills: 0 | Status: SHIPPED | OUTPATIENT
Start: 2022-01-18 | End: 2022-02-18 | Stop reason: SDUPTHER

## 2022-01-18 NOTE — TELEPHONE ENCOUNTER
From: Karri Barrera  To: Dr. Zechariah Kenyon: 1/12/2022 8:59 AM EST  Subject: Medication refill denial    I have left 2 voicemails with no response on why my medication refills were denied. Jardiance and Metformin. I have now 5 days worth left.  Please advise

## 2022-02-08 ENCOUNTER — TELEPHONE (OUTPATIENT)
Dept: PHARMACY | Facility: CLINIC | Age: 47
End: 2022-02-08

## 2022-02-08 NOTE — TELEPHONE ENCOUNTER
2022 Annual Pharmacist Visit    Called patient to schedule 2022 yearly pharmacist appointment to discuss medications for Diabetes Management Program.      No answer. Left VM on Mobile TAD: Please call back at [phone] Option #3.      Twan Kovacs, [address]   Department, toll free: [phone] Option #3

## 2022-02-09 NOTE — TELEPHONE ENCOUNTER
For [place] in place:  No   Recommendation Provided To: Patient/Caregiver: 1 via Telephone   Intervention Detail: Scheduled Appointment   Gap Closed?: Yes    Intervention Accepted By: Patient/Caregiver: 1   Time Spent (min): 5

## 2022-02-14 ENCOUNTER — SCHEDULED TELEPHONE ENCOUNTER (OUTPATIENT)
Dept: PHARMACY | Facility: CLINIC | Age: 47
End: 2022-02-14

## 2022-02-14 DIAGNOSIS — I10 ESSENTIAL HYPERTENSION: ICD-10-CM

## 2022-02-14 RX ORDER — LISINOPRIL 40 MG/1
TABLET ORAL
Qty: 90 TABLET | Refills: 0 | Status: SHIPPED | OUTPATIENT
Start: 2022-02-14

## 2022-02-14 RX ORDER — LORATADINE 10 MG/1
10 TABLET ORAL 2 TIMES DAILY
COMMUNITY

## 2022-02-14 NOTE — TELEPHONE ENCOUNTER
Nitesh Bentley MD: patient is currently enrolled in [facility] Diabetes [facility] - Be Well with Diabetes  · Pt needs refill Rx of lisinopril (due and no refills remaining on previous Rx)  · See 2/14/22 pharmacy visit for details     Order pending for provider convenience, please review/modify & sign if in agreement. Thank you!   Leigh Sanz, PharmD, [facility]  Department, toll free: [phone], option 7

## 2022-02-14 NOTE — TELEPHONE ENCOUNTER
POPULATION HEALTH CLINICAL PHARMACY REVIEW - Be Well with Diabetes    Linda Sebastian is a 48 y.o. female enrolled in the [facility] Employee Diabetes Program. Patient provided Rachna Burnett with verbal consent to remain in the program for this year.     Insurance through the following employer: [facility]    Medications:  Medication Sig    atorvastatin (LIPITOR) 80 MG tablet Take 1 tablet by mouth daily `    empagliflozin (JARDIANCE) 25 MG tablet TAKE 1 TABLET BY MOUTH ONE TIME A DAY    metFORMIN (GLUCOPHAGE-XR) 500 MG extended release tablet TAKE 1 TABLET BY MOUTH 2 TIMES A DAY    fenofibrate (TRICOR) 145 MG tablet Take 1 tablet by mouth daily    omeprazole (PRILOSEC) 20 MG delayed release capsule Take 1 capsule by mouth 2 times daily    magnesium oxide (MAG-OX) 400 MG tablet TAKE 1 TABLET BY MOUTH 2 TIMES A DAY    blood glucose test strips (AGAMATRIX PRESTO TEST) strip USE TO TEST BLOOD SUGARS FOUR TIMES DAILY    hydroCHLOROthiazide (HYDRODIURIL) 25 MG tablet Take 1 tablet by mouth daily    ASPIRIN ADULT LOW STRENGTH 81 MG EC tablet TAKE 1 TABLET BY MOUTH ONE TIME A DAY    amitriptyline (ELAVIL) 50 MG tablet TAKE 1 TABLET BY MOUTH DAILY    AgaMatrix Ultra-Thin Lancets MISC Check qac and qhs blood sugars    magnesium oxide (MAG-OX) 400 (241.3 Mg) MG TABS tablet TAKE 1 TABLET BY MOUTH 2 TIMES A DAY  - duplicate    promethazine (PHENERGAN) 25 MG tablet TAKE 1 TABLET BY MOUTH EVERY 6 HOURS AS NEEDED FOR NAUSEA OR VOMITING  - was getting while on chemotherapy; has not needed, would like removed from list    ondansetron (ZOFRAN) 4 MG tablet Take 1 tablet by mouth  - was getting while on chemotherapy; has not needed, would like removed from list  - last radiation today    insulin regular human (HUMULIN R U-500 KWIKPEN) 500 UNIT/ML SOPN concentrated injection pen INJECT 140 UNITS BEFORE BREAKFAST, 140 UNITS BEFORE LUNCH,  UNITS BEFORE SUPPER  - current dose as above (back to regular eating habits/appetite), has sliding scale    lisinopril (PRINIVIL;ZESTRIL) 40 MG tablet TAKE 1 TABLET BY MOUTH ONE TIME A DAY    Insulin Pen Needle 32G X 4 MM MISC 1 each by Does not apply route 3 times daily    medroxyPROGESTERone (DEPO-PROVERA) 150 MG/ML injection Inject 150 mg into the muscle once. (Patient not taking: Reported on 11/15/2021)  - stopped, would like removed from list      OTC allergy medicine BID - loratadine 10 mg BID (says was taking once daily and increased to BID while on chemotherapy). Tried to stop but then had worsened allergies. BID working best for her, no ADRs (dry mouth constipation). Per LexiComp higher doses of 10 mg BID have been used in urticaria. Current Pharmacy: [facility]  Current testing supplies/frequency: Agamatrix Presto, Rx for testing 4 times per day; working well, adequate supplies  Pen needles/syringes: on Leader pen needles, 32G x 4mm qty 300 - size OK    SMBGs: pretty much back to what they were before. Had not been eating for a while but normal now. 110-200 depending on what she eats. No regular issues with hypoglycemia. Can feel when going low.     Allergies:  No Known Allergies   Vitals/Labs:  BP Readings from Last 3 Encounters:   11/15/21 122/82   07/28/21 128/76   07/21/21 124/72     Lab Results   Component Value Date    LABMICR 2.90 (H) 11/20/2020     Lab Results   Component Value Date    LABA1C 7.5 04/27/2021    LABA1C 7.4 11/20/2020    LABA1C 7.7 05/29/2020     A1c 6.7% 11/4/21 (see 11/15/21 scanned into media tab) - input result    Lab Results   Component Value Date    CHOL 177 05/19/2021    TRIG 377 (H) 05/19/2021    HDL 29 (L) 05/19/2021    LDLCALC see below 05/19/2021    LDLDIRECT 93 05/19/2021     ALT   Date Value Ref Range Status   06/30/2021 23 10 - 40 U/L Final     AST   Date Value Ref Range Status   06/30/2021 23 15 - 37 U/L Final     The 10-year ASCVD risk score (Nyla Cruz, et al., 2013) is: 4.9%    Values used to calculate the score:      Age: 48 years      Sex: Female      Is Non- : No      Diabetic: Yes      Tobacco smoker: No      Systolic Blood Pressure: 314 mmHg      Is BP treated: Yes      HDL Cholesterol: 29 mg/dL      Total Cholesterol: 177 mg/dL     Lab Results   Component Value Date    CREATININE 0.7 2021     CrCl cannot be calculated (Patient's most recent lab result is older than the maximum 120 days allowed. ). Lab Results   Component Value Date    LABGLOM >60 2021    LABGLOM >60 2021    LABGLOM >60 2020    LABGLOM >60 2019       Immunizations:  Immunization History   Administered Date(s) Administered    COVID-19, Maryann Wange, Primary or Immunocompromised, PF, 100mcg/0.5mL 2021, 2021    Influenza Vaccine, unspecified formulation 10/10/2016    Influenza Virus Vaccine 2017, 2018, 2018, 2019, 2020    Influenza, MDCK Quadv, IM, PF (Flucelvax 2 yrs and older) 11/15/2021    Pneumococcal Polysaccharide ([id number]) 2016    Tdap (Boostrix, Adacel) 2012      Social History:  Social History     Tobacco Use    Smoking status: Former Smoker     Packs/day: 1.00     Years: 18.00     Pack years: 18.00     Types: Cigarettes     Quit date: 2010     Years since quittin.4    Smokeless tobacco: Never Used   Substance Use Topics    Alcohol use: No     ASSESSMENT:  Initial Program Requirements (Y indicates has completed for the year, N indicates needs to be completed by 2022): No - Provider Visit for DM (1st)  No - A1c (1st)     Ongoing Program Requirements (Y indicates has completed for the year, N indicates needs to be completed by 2022):   No - Provider Visit for DM (2nd)  Yes - ACC/diabetes educator visit (if A1c over 8%)  No - A1c (2nd)  No - Lipid panel  No - Urine microalbumin  Yes - Pneumococcal vaccination: up to date  No - Influenza vaccination for 2022  Yes - Medication adherence over 70% - PDC n/a since is on insulin  Yes - On statin or contraindication(s) atorvastatin  Yes - On ACEi/ARB or contraindication(s) lisinopril     Formulary Medication Review:  Non-formulary or medications with cost-effective alternatives: none identified. Current medications eligible for copay waiver, up to $600, through [address]:  - aspirin (with prescription), atorvastatin, Jardiance, fenofibrate, hydrochlorothiazide, Humulin R U500 insulin, lisinopril, metformin  - Agamatrix blood sugar testing supplies, generic insulin pen needles     Diabetes Care:   - Glycemic Goal: <7.0%. Is at blood glucose goal. Current regimen metformin  mg BID, Jardiance 25 mg daily, Humulin R U500 140 units TID. F/b endocrinology (Dr. Kaveh Trujillo). Recent chemotherapy/radiation (completing radiation today) - had decreased appetite while receiving chemotherapy (so was doing insulin BID during this time) but back to previous appetite now. · Medication adherence: appears adherent per refill history  · Medication history/therapy optimization: unable to tolerate higher dose of metformin; has tried Victoza and Bydureon in the past (per endo note GLP did not help)    Other Considerations:  - Blood Pressure Goal: BP less than 140/90 mmHg due to history of DM: Is at blood pressure goal. Gap in refill history (7/13/21, 10/5/21 x 90 ds) - pt states has a few left but will be needing soon. - Lipids: Patient has a 10-yr ASCVD risk of less than 20% with DM and is therefore a candidate for moderate- to high-intensity statin therapy based on updated guidelines. Is prescribed high intensity statin (atovastatin 80 mg) - appears adherent; is also on fenofibrate - h/o TGs over 500 mg/dL.   - Smoking status: former smoker    PLAN:  - Consideration(s) for provider:   · Refill Rx for lisinopril (no RR) - see refill encounter to Erasto Benavides MD   - DM program gaps identified:   · Initial requirements: provider visit for diabetes (1st) and A1c (1st)   · Ongoing requirements: provider visit for diabetes (2nd), diabetes education/care coordination (if A1c becomes over 8%), A1c (2nd), Lipid panel, Urine microalbumin, Influenza vaccination for [id number] and Medication adherence over 70%   - Education to patient: as above and:   · Continue to follow with endo for management while on U500 insulin, monitor for hypoglycemia. Pt states overdue for endo visit but has had hard time scheduling.  Will try routing chart to office staff  · CGM coverage if desired  - Follow up: PCP for identified gaps or as scheduled below and Endocrinologist for identified gaps or as scheduled below  - Upcoming appointments:   Future Appointments   Date Time Provider Jean-Paul Luong   2/14/2022  9:30 AM SCHEDULE, [facility] Rx None   8/5/2022  2:00 PM [address] [place] [address] Dr VALERIA MITCHELL [place] Alfonso Ramirez, PharmD, [facility]  Department, toll free: [phone]

## 2022-02-15 NOTE — TELEPHONE ENCOUNTER
Endocrinology staff - can you please contact patient to assist with scheduling follow up visit with Dr. Chun Mckenzie for her diabetes? Thank you! Helen Pinto, SenaD, [facility]  Department, toll free: [phone]   ======================================================================  Rx for lisinopril refill signed by provider - thank you! Will route chart to endocrinology office to see if they can assist with scheduling appt with endocrinology.

## 2022-02-15 NOTE — TELEPHONE ENCOUNTER
Noted Rx signed by provider - thank you! Will close this encounter, see 2/14/22 pharmacy visit for continuous care.

## 2022-02-16 NOTE — TELEPHONE ENCOUNTER
Noted pt has endocrinology visit scheduled for 2/18/22 - thank you! [employer] message sent to patient.     For Boby Gifford in place:  No   Recommendation Provided To: Provider: 2 via Note to Provider   Intervention Detail: Refill(s) Provided and Scheduled Appointment   Gap Closed?: Yes    Intervention Accepted By: Provider: 2   Time Spent (min): 45

## 2022-02-18 ENCOUNTER — TELEMEDICINE (OUTPATIENT)
Dept: ENDOCRINOLOGY | Age: 47
End: 2022-02-18
Payer: COMMERCIAL

## 2022-02-18 ENCOUNTER — TELEPHONE (OUTPATIENT)
Dept: ENDOCRINOLOGY | Age: 47
End: 2022-02-18

## 2022-02-18 DIAGNOSIS — I10 ESSENTIAL HYPERTENSION: ICD-10-CM

## 2022-02-18 DIAGNOSIS — E11.9 INSULIN DEPENDENT TYPE 2 DIABETES MELLITUS, CONTROLLED (HCC): Primary | ICD-10-CM

## 2022-02-18 DIAGNOSIS — Z79.4 INSULIN DEPENDENT TYPE 2 DIABETES MELLITUS, CONTROLLED (HCC): Primary | ICD-10-CM

## 2022-02-18 DIAGNOSIS — E78.49 OTHER HYPERLIPIDEMIA: ICD-10-CM

## 2022-02-18 PROCEDURE — 99214 OFFICE O/P EST MOD 30 MIN: CPT | Performed by: INTERNAL MEDICINE

## 2022-02-18 RX ORDER — EMPAGLIFLOZIN 25 MG/1
TABLET, FILM COATED ORAL
Qty: 90 TABLET | Refills: 1 | Status: SHIPPED | OUTPATIENT
Start: 2022-02-18 | End: 2022-10-14 | Stop reason: SDUPTHER

## 2022-02-18 RX ORDER — INSULIN HUMAN 500 [IU]/ML
INJECTION, SOLUTION SUBCUTANEOUS
Qty: 24 EACH | Refills: 2 | Status: SHIPPED | OUTPATIENT
Start: 2022-02-18

## 2022-02-18 RX ORDER — METFORMIN HYDROCHLORIDE 500 MG/1
TABLET, EXTENDED RELEASE ORAL
Qty: 180 TABLET | Refills: 1 | Status: SHIPPED | OUTPATIENT
Start: 2022-02-18 | End: 2022-10-21 | Stop reason: SDUPTHER

## 2022-02-18 NOTE — PROGRESS NOTES
Seen as f/u patient for diabetes      Patient was evaluated through a synchronous (real-time) audio-video  encounter. The patient (or guardian if applicable) is aware that this is a billable service, which includes applicable co-pays. This Virtual Visit was  conducted with patient's (and/or legal guardian's) consent. The visit was conducted pursuant to the emergency declaration under the [address] [place] [address] and the [employer] Act. Patient identification was verified,  and a caregiver was present when appropriate. The patient was located in a state where the provider was licensed to provide care.     Interim:     stable  Dx with breast cancer  went through chemotherapy XRT  Has steroids with it  Working from home  Reports difficulty making appt/phone issue    Diagnosed with Type 2 diabetes mellitus 4-5 yrs ago  Known diabetic complications: None    Current diabetic medications   U-500 pen 120/120  But taking 100 TID     SSI 5 for 50 >150     Metformin ER  500mg BID  jardiance 25mg    Previous  lantus 110 units HS  Novolog 25 TID + SSI 5 for 50>150  Usually takes 35-40  januvia 100mg  Was on victoza in the past for 2 months  Bydureon    Moderate, uncontrolled    Insulin started 3/13    Intolerance to diabetes medications: yes - Metformin     Last A1c 7.4%<-----7.5%<------ 6.9%<-----7%<------6.6%<------6.6%<----- 6.8%<-----6.3 on 3/17<----6.3 on 12/16<----6.1 on 3/15  <---- 8.5 On 12/13<---- 9.6<---- 8.9 on 5/13<---10.8<---10.9    Prior visit with dietician: Yes   Current diet: on average, 3 meals per day 45gm CHO  Current exercise: No exercise for one month  Current monitoring regimen: home blood tests occ , mainly in evening    Has brought blood glucose log/meter:yes  Home blood sugar records:112-180  Any episodes of hypoglycemia? occ    No Hx of CAD , PVD, CVA    Hyperlipidemia: pravastatin 40mg fenofibrate 145  on 10/14-switched to lipitor 80mg LDL 73 on 2/16  LDL 96  HDL 29 on 3/18     LDL 82 on 2/19  Tolerating , no aches  LDL 76 on 3/20  LDL 93 on 5/21  Last eye exam: 3/21  Last foot exam:5/19  Last microalbumin to creatinine ratio: M/C nl on 5/19  ,   35 on 5/13 34 on 9/13  nl on 3/12---> 33.6 on 11/20----> 11/21    HTN: Stable, tolerating Lisinopril 40mg HCTZ 25mg    4/14  CGMS Report   CGMS insertion date 4/29/14 CGMS data collection was performed on 4/29/14 - 5/3/14. Patient provided written diet, activities and medical management for specified period. MAD 3.6 %   Average reading 252 mg/dL   Std Dev 49 mg/dL   % of time <70 mg/dL 0 %   % of time >140 mg/dL 100 %   Number of hypoglycemia episodes noted: 0   CGMS showed daytime and nocturnal hyperglycemia   Impression:   Daytime and nocturnal hyperglycemia . No documentation of U500 injections. See scanned i-Pro download   Recommendation:   Advised patient to reduce carbohydrate intake to no more than 30 grams with large meals. Add Invokana or Rockne Custard. Increase Humulin U500 by 5-10% or change Humulin U500 to be delivered via V-Go 20 and IC of 1:6    Review of Systems    Constitutional: Negative for weight loss and malaise/fatigue. Negative for fever   Eyes: Negative for blurred vision. Negative for double vision, photophobia and pain. Respiratory: Negative for cough and sputum production. Cardiovascular: Negative for chest pain, palpitations and leg swelling. Gastrointestinal: Negative for nausea, vomiting and abdominal pain. Genitourinary: Negative for dysuria, urgency and frequency. Musculoskeletal: Negative for back pain. No joint pain  Skin: Negative for itching and rash. Neurological: Negative for dizziness. Negative for tingling, tremors, focal weakness and headaches.  +neuropathy  Endo/Heme/Allergies: see HPI        PHYSICAL EXAMINATION:  [ INSTRUCTIONS:  \"[x]\" Indicates a positive item  \"[]\" Indicates a negative item  -- DELETE ALL ITEMS NOT EXAMINED]  Vital Signs: (As obtained by patient/caregiver or practitioner observation)    Blood pressure-  Heart rate-    Respiratory rate-    Temperature-  Pulse oximetry-     Constitutional Appears well-developed and well-nourished No apparent distress        Mental status  Alert and awake  Oriented to person/place/time  Able to follow commands      Eyes:  EOM    [x]  Normal    Sclera  [x]  Normal           Discharge [x]  None visible      HENT:   [x] Normocephalic, atraumatic. [x] Mouth/Throat: Mucous membranes are moist.     External Ears [x] Normal      Neck: [x] No visualized mass     Pulmonary/Chest: [x] Respiratory effort normal.  [x] No visualized signs of difficulty breathing or respiratory distress             Musculoskeletal:   [x] Normal gait with no signs of ataxia         [x] Normal range of motion of neck              Neurological:        [x] No Facial Asymmetry (Cranial nerve 7 motor function) (limited exam to video visit)          [x] No gaze palsy                Skin:        [x] No significant exanthematous lesions or discoloration noted on facial skin                 Psychiatric:       [x] Normal Affect [x] No Hallucinations            Other pertinent observable physical exam findings-     Due to this being a TeleHealth encounter, evaluation of the following organ systems is limited: Vitals/Constitutional/EENT/Resp/CV/GI//MS/Neuro/Skin/Heme-Lymph-Imm. Services were provided through a video synchronous discussion virtually to substitute for in-person clinic visit.      5/19  Skeletal foot exam is normal, no skin lesions, toenails are normal, 10 g monofilament is 10/10, Dry feet    Lab Reviewed   No components found for: CHLPL  Lab Results   Component Value Date    TRIG 377 (H) 05/19/2021    TRIG 287 (H) 03/02/2020    TRIG 278 (H) 02/27/2019     Lab Results   Component Value Date    HDL 29 (L) 05/19/2021    HDL 29 (L) 03/02/2020    HDL 32 (L) 02/27/2019     Lab Results Component Value Date    LDLCALC see below 05/19/2021 [address] 76 03/02/2020 [address] 82 02/27/2019     Lab Results   Component Value Date    LABVLDL see below 05/19/2021    LABVLDL 56 02/27/2019    LABVLDL see below 03/06/2018     Lab Results   Component Value Date    LABA1C 6.7 11/04/2021       Assessment:     Lee Choi is a 48 y.o. female with :    1.T2DM:Longstanding, insulin resistant,fairly controlled. Given A1c and insulin requirements ,  switched to U-500 insulin. Added GLP agonist, did not help. On SGLT-2 inhibitor, check A1c , needs self monitoring . Reviewed log, some fasting high, may need higher dose with supper. Fasting high could be due to snack  2.HTN;At goal per patient  3. HLD:LDL at goal, on lipitor/ fenofibrate  4. Obesity:Recommend aggressive life style and discussed bariatric option  5. SUNDAY: using Cpap machine more now  6. Breast Cancer: Had surgery, s/p chemotherapy,  XRT    Plan:       U-500  Change to 100/100/100   SSI 5 for 50>150    jardiance 25mg    Metformin ER 500mg BID   Discussed in detail use of U-500. Discussed side effects of hypoglcyemia   Advise to check blood sugar 2 times a day   Patient to send blood sugar log for titration. Advise to exercise regularly. Advise to low simple carbohydrate and protein with each  meal diet. Diabetes Care: recommend yearly eye exam, foot exam and urine microalbumin to   creatinine ratio.      -Hyperlipidemia, LDL goal is <100 mg/dl   -Hypertension:Continue current  -Daily ASA:Not indicated  -Smoking status:Non smoker    She is on U-500 insulin which has a narrow therapeutic index and high risk of complications including severe hypoglycemia

## 2022-03-04 DIAGNOSIS — I10 ESSENTIAL HYPERTENSION: ICD-10-CM

## 2022-03-07 DIAGNOSIS — E11.9 INSULIN DEPENDENT TYPE 2 DIABETES MELLITUS, CONTROLLED (HCC): ICD-10-CM

## 2022-03-07 DIAGNOSIS — E78.49 OTHER HYPERLIPIDEMIA: ICD-10-CM

## 2022-03-07 DIAGNOSIS — I10 ESSENTIAL HYPERTENSION: ICD-10-CM

## 2022-03-07 DIAGNOSIS — Z79.4 INSULIN DEPENDENT TYPE 2 DIABETES MELLITUS, CONTROLLED (HCC): ICD-10-CM

## 2022-03-07 RX ORDER — HYDROCHLOROTHIAZIDE 25 MG/1
TABLET ORAL
Qty: 90 TABLET | Refills: 0 | Status: SHIPPED | OUTPATIENT
Start: 2022-03-07 | End: 2022-06-13 | Stop reason: SDUPTHER

## 2022-03-08 LAB
ESTIMATED AVERAGE GLUCOSE: 151.3 MG/DL
HBA1C MFR BLD: 6.9 %

## 2022-04-07 LAB
CHOLESTEROL, TOTAL: 197 MG/DL (ref 0–199)
ESTIMATED AVERAGE GLUCOSE: 157.1 MG/DL
GLUCOSE BLD-MCNC: 139 MG/DL (ref 70–99)
HBA1C MFR BLD: 7.1 %
HDLC SERPL-MCNC: 36 MG/DL (ref 40–60)
LDL CHOLESTEROL CALCULATED: 105 MG/DL
TRIGL SERPL-MCNC: 282 MG/DL (ref 0–150)

## 2022-04-08 DIAGNOSIS — Z79.4 INSULIN DEPENDENT TYPE 2 DIABETES MELLITUS, CONTROLLED (HCC): ICD-10-CM

## 2022-04-08 DIAGNOSIS — E11.9 INSULIN DEPENDENT TYPE 2 DIABETES MELLITUS, CONTROLLED (HCC): ICD-10-CM

## 2022-04-08 RX ORDER — PEN NEEDLE, DIABETIC 32GX 5/32"
NEEDLE, DISPOSABLE MISCELLANEOUS
Qty: 100 EACH | Refills: 2 | Status: SHIPPED | OUTPATIENT
Start: 2022-04-08 | End: 2022-09-15

## 2022-04-18 RX ORDER — EMPAGLIFLOZIN 25 MG/1
TABLET, FILM COATED ORAL
Qty: 90 TABLET | Refills: 1 | OUTPATIENT
Start: 2022-04-18

## 2022-05-05 DIAGNOSIS — E78.49 OTHER HYPERLIPIDEMIA: ICD-10-CM

## 2022-05-05 NOTE — PATIENT INSTRUCTIONS
Call central scheduling for mammogram:   [phone]      Schedule follow up appointment with Dr Galina Arizmendi in 6 months
5

## 2022-05-09 DIAGNOSIS — E78.49 OTHER HYPERLIPIDEMIA: ICD-10-CM

## 2022-05-09 RX ORDER — ATORVASTATIN CALCIUM 80 MG/1
80 TABLET, FILM COATED ORAL DAILY
Qty: 90 TABLET | Refills: 0 | OUTPATIENT
Start: 2022-05-09

## 2022-05-09 RX ORDER — ATORVASTATIN CALCIUM 80 MG/1
80 TABLET, FILM COATED ORAL DAILY
Qty: 30 TABLET | Refills: 0 | Status: SHIPPED | OUTPATIENT
Start: 2022-05-09 | End: 2022-06-13 | Stop reason: SDUPTHER

## 2022-05-16 ENCOUNTER — TELEMEDICINE (OUTPATIENT)
Dept: PRIMARY CARE CLINIC | Age: 47
End: 2022-05-16
Payer: COMMERCIAL

## 2022-05-16 DIAGNOSIS — Z12.11 COLON CANCER SCREENING: ICD-10-CM

## 2022-05-16 DIAGNOSIS — E78.49 OTHER HYPERLIPIDEMIA: ICD-10-CM

## 2022-05-16 DIAGNOSIS — I10 PRIMARY HYPERTENSION: Primary | ICD-10-CM

## 2022-05-16 PROCEDURE — 99214 OFFICE O/P EST MOD 30 MIN: CPT | Performed by: FAMILY MEDICINE

## 2022-05-16 RX ORDER — POLYMYXIN B SULFATE AND TRIMETHOPRIM 1; 10000 MG/ML; [USP'U]/ML
1 SOLUTION OPHTHALMIC EVERY 4 HOURS
Qty: 1 EACH | Refills: 0 | Status: SHIPPED | OUTPATIENT
Start: 2022-05-16 | End: 2022-05-26

## 2022-05-16 SDOH — ECONOMIC STABILITY: FOOD INSECURITY: WITHIN THE PAST 12 MONTHS, YOU WORRIED THAT YOUR FOOD WOULD RUN OUT BEFORE YOU GOT MONEY TO BUY MORE.: NEVER TRUE

## 2022-05-16 SDOH — ECONOMIC STABILITY: FOOD INSECURITY: WITHIN THE PAST 12 MONTHS, THE FOOD YOU BOUGHT JUST DIDN'T LAST AND YOU DIDN'T HAVE MONEY TO GET MORE.: NEVER TRUE

## 2022-05-16 ASSESSMENT — SOCIAL DETERMINANTS OF HEALTH (SDOH): HOW HARD IS IT FOR YOU TO PAY FOR THE VERY BASICS LIKE FOOD, HOUSING, MEDICAL CARE, AND HEATING?: NOT HARD AT ALL

## 2022-05-16 NOTE — PROGRESS NOTES
PROGRESS NOTE     Jeff Pennington MD  [address] [facility]                                  [address] [facility], [address] [place], [place] [address] [address]         Phone: [phone]      Name:  Camryn Corcoran  :  1975  Date:  2022    ASSESSMENT/PLAN:    1. DM type 2 with diabetic dyslipidemia ([facility] [address] [facility]  Recent A1c =7.1.    -U-500  insulin managed by endocrine, dr Malachi Zepeda. Reviewed his recent consult note from 22. .      Pt to continue metformin, jardiance     --pt on ASA, statin and ACEI     Requested diabetic eye exam records  ([facility]) many times, and never received. Pt is adamant she is still up to date and gets it done every fall.     Checking CMP        2. Hyperlipidemia  Not completely controlled, but on max dose of lipitor. Pt will \"think about\" starting zetia in future.   Continue (tricor 145, lipitor 80mg).       3. Hypertension  at goal is <130/80. Heddy  Continue meds. (HCTZ 25, lisinopril 40mg)      Checking CMP     4. SUNDAY on CPAP  Well controlled. Continue CPAP.          5. GERD (gastroesophageal reflux disease)/hypomagnesium  Well controlled, continue ppi        6. Malignant neoplasm of overlapping sites of left breast in female, estrogen receptor negative ([facility]  Summarized below a status post unilateral mastectomy, and  finished chemotherapy and radiation. Has follow-up with breast surgeon in August, and will review repeat mammogram at that time.           HM:        19 normal pap:       Declining colonoscopy.   Ordering Cologuard instead as is agreeable to doing this.     Pt to schedule nurse visit for prevnar 20 and Diabetic foot exam (as can't do virtually)    Pt states had COVID booster at [address]:    CC:  Breast cancer left breast (estrogen receptor neg),  DM2, HTN, HLD, SUNDAY, GERD, hypomagnesium     HPI  49-year-old white female here on virtual visit for follow-up of the above chronic medical conditions. Patient is happy to announce that she finished her chemotherapy in December 2021 and her radiation in February 2022. Her hair is starting to grow back and she is starting to have some more energy. She has a follow-up mammogram and appointment with her breast surgeon, Dr. Mirta Clark in August 2022. Last saw her endocrinologist, Dr. Victoriano Martines, 2/18/2022. According to note no changes were made. Patient denies any hypoglycemia. States most of her sugars are at goal.    Patient wears a CPAP 6 hours a night, and denies any symptoms of uncontrolled sleep apnea, see review of systems below. Acid reflux is well controlled as long as she takes her medication. Patient states she tries to make good food choices, but only does so about 50% of the time.   Not currently getting good exercise.             Lab Results   Component Value Date    LABA1C 7.1 04/07/2022    LABA1C 6.9 03/07/2022    LABA1C 6.7 11/04/2021     Lab Results   Component Value Date    LABMICR 2.90 (H) 11/20/2020    CREATININE 0.7 06/30/2021     Lab Results   Component Value Date    ALT 23 06/30/2021    AST 23 06/30/2021     Lab Results   Component Value Date    CHOL 197 04/07/2022    TRIG 282 (H) 04/07/2022    HDL 36 (L) 04/07/2022    LDLCALC 105 (H) 04/07/2022    LDLDIRECT 93 05/19/2021        Lab Results   Component Value Date    MG 1.60 06/30/2021          ROS:  Constitutional:  Negative fever or fatigue , appetite change  Eyes:  Negative for eye pain or visual changes  Resp:  Negative for SOB, chest tightness, cough  Cardiovascular: Negative for CP, palpitations, LOMBARDO, orthopnea, PND, LE edema  Gastrointestinal: Negative for abd pain, melena, BRBPR, N/V/D  Endocrine:  Negative for polydipsia and polyuria  :  Negative for dysuria, flank pain or urinary frequency  Musculoskeletal:  Negative for back pain or myalgias  Neuro:  Negative for dizziness or lightheadedness  Psych: negative for depression or anxiety         Patient-Reported Vitals 5/16/2022   Patient-Reported Weight 285LBS   Patient-Reported Height -   Patient-Reported Systolic 087   Patient-Reported Diastolic 78   Patient-Reported Pulse -   Patient-Reported Temperature -   Patient-Reported SpO2 -        Outpatient Medications Marked as Taking for the 5/16/22 encounter (Telemedicine) with Leon Tomas MD   Medication Sig Dispense Refill    atorvastatin (LIPITOR) 80 MG tablet Take 1 tablet by mouth daily ` 30 tablet 0    TRUEPLUS PEN NEEDLES 32G X 4 MM MISC USE THREE TIMES A  each 2    hydroCHLOROthiazide (HYDRODIURIL) 25 MG tablet TAKE 1 TABLET BY MOUTH ONCE DAILY 90 tablet 0    metFORMIN (GLUCOPHAGE-XR) 500 MG extended release tablet TAKE 1 TABLET BY MOUTH 2 TIMES A  tablet 1    empagliflozin (JARDIANCE) 25 MG tablet TAKE 1 TABLET BY MOUTH ONE TIME A DAY 90 tablet 1    insulin regular human (HUMULIN R U-500 KWIKPEN) 500 UNIT/ML SOPN concentrated injection pen INJECT 100- 120 units before meals 24 each 2    loratadine (CLARITIN) 10 MG tablet Take 10 mg by mouth 2 times daily      lisinopril (PRINIVIL;ZESTRIL) 40 MG tablet TAKE 1 TABLET BY MOUTH ONE TIME A DAY 90 tablet 0    fenofibrate (TRICOR) 145 MG tablet Take 1 tablet by mouth daily 90 tablet 1    omeprazole (PRILOSEC) 20 MG delayed release capsule Take 1 capsule by mouth 2 times daily 180 capsule 1    magnesium oxide (MAG-OX) 400 MG tablet TAKE 1 TABLET BY MOUTH 2 TIMES A  tablet 1    ASPIRIN ADULT LOW STRENGTH 81 MG EC tablet TAKE 1 TABLET BY MOUTH ONE TIME A  tablet 3    amitriptyline (ELAVIL) 50 MG tablet TAKE 1 TABLET BY MOUTH DAILY         Physical Exam:    Constitutional:   · Reviewed vitals above  · Well Nourished, well developed, no distress       HENT:  · No trouble breathing through nose  Resp:  · Normal effort  · No visualized signs of difficulty breathing or respiratory distress  Skin:  · No rashes on inspection of facial skin  Psych:  · Normal mood and affect  · Normal insight and judgement                  Orville Nascimento  is a 48 y.o.  female being evaluated by a Virtual Visit (video visit) encounter to address concerns as mentioned above. A caregiver was present when appropriate. Due to this being a TeleHealth encounter (During BQIBV-72 public health emergency), evaluation of the following organ systems was limited: Vitals/Constitutional/EENT/Resp/CV/GI//MS/Neuro/Skin/Heme-Lymph-Imm. Pursuant to the emergency declaration under the [facility], [address] [place] [address] authority and the [employer] Act, this Virtual Visit was conducted with patient's (and/or legal guardian's) consent, to reduce the patient's risk of exposure to COVID-19 and provide necessary medical care. The patient (and/or legal guardian) has also been advised to contact this office for worsening conditions or problems, and seek emergency medical treatment and/or call 911 if deemed necessary. Patient identification was verified at the start of the visit: Yes    Services were provided through a video synchronous discussion virtually to substitute for in-person clinic visit. Patient was located at home and provider was located in office or at home. EMR Dragon/transcription disclaimer:  Much of this encounter note is electronic transcription/translation of spoken language to printed texts. The electronic translation of spoken language may be erroneous, or at times, nonsensical words or phrases may be inadvertently transcribed. Although I have reviewed the note for such errors, some may still exist.       An electronic signature was used to authenticate this note.     --Braulio Borrero MD

## 2022-06-03 ENCOUNTER — TELEMEDICINE (OUTPATIENT)
Dept: ENDOCRINOLOGY | Age: 47
End: 2022-06-03
Payer: COMMERCIAL

## 2022-06-03 DIAGNOSIS — E11.9 INSULIN DEPENDENT TYPE 2 DIABETES MELLITUS, CONTROLLED (HCC): Primary | ICD-10-CM

## 2022-06-03 DIAGNOSIS — Z79.4 INSULIN DEPENDENT TYPE 2 DIABETES MELLITUS, CONTROLLED (HCC): Primary | ICD-10-CM

## 2022-06-03 DIAGNOSIS — E78.49 OTHER HYPERLIPIDEMIA: ICD-10-CM

## 2022-06-03 PROCEDURE — 3051F HG A1C>EQUAL 7.0%<8.0%: CPT | Performed by: INTERNAL MEDICINE

## 2022-06-03 PROCEDURE — 99214 OFFICE O/P EST MOD 30 MIN: CPT | Performed by: INTERNAL MEDICINE

## 2022-06-03 NOTE — PROGRESS NOTES
Seen as f/u patient for diabetes      Patient was evaluated through a synchronous (real-time) audio-video  encounter. The patient (or guardian if applicable) is aware that this is a billable service, which includes applicable co-pays. This Virtual Visit was  conducted with patient's (and/or legal guardian's) consent. The visit was conducted pursuant to the emergency declaration under the [address] [place] [address] and the [employer] Act. Patient identification was verified,  and a caregiver was present when appropriate. The patient was located in a state where the provider was licensed to provide care.     Interim:     Stable  No issues      Dx with breast cancer  went through chemotherapy XRT  Has steroids with it  Working from home    Diagnosed with Type 2 diabetes mellitus 4-5 yrs ago  Known diabetic complications: None    Current diabetic medications   U-500 pen 100 TID     SSI 5 for 50 >150     Metformin ER  500mg BID  jardiance 25mg    Previous  lantus 110 units HS  Novolog 25 TID + SSI 5 for 50>150  Usually takes 35-40  januvia 100mg  Was on victoza in the past for 2 months  Bydureon    Moderate, uncontrolled    Insulin started 3/13    Intolerance to diabetes medications: yes - Metformin     Last A1c 7.1%<---- 7.4%<-----7.5%<------ 6.9%<-----7%<------6.6%<------6.6%<----- 6.8%<-----6.3 on 3/17<----6.3 on 12/16<----6.1 on 3/15  <---- 8.5 On 12/13<---- 9.6<---- 8.9 on 5/13<---10.8<---10.9    Prior visit with dietician: Yes   Current diet: on average, 3 meals per day 45gm CHO  Current exercise: No exercise for one month  Current monitoring regimen: home blood tests occ , mainly in evening    Has brought blood glucose log/meter:yes  Home blood sugar records:130-181  Any episodes of hypoglycemia? occ    No Hx of CAD , PVD, CVA    Hyperlipidemia: pravastatin 40mg fenofibrate 145   on 10/14-switched to lipitor 80mg LDL 73 on 2/16  LDL 96  HDL 29 on 3/18     LDL 82 on 2/19  Tolerating , no aches  LDL 76 on 3/20  LDL 93 on 5/21       Last eye exam: 3/21  Last foot exam:5/19  Last microalbumin to creatinine ratio: M/C nl on 5/19  ,   35 on 5/13 34 on 9/13  nl on 3/12---> 33.6 on 11/20----> 11/21    HTN: Stable, tolerating Lisinopril 40mg HCTZ 25mg    4/14  CGMS Report   CGMS insertion date 4/29/14 CGMS data collection was performed on 4/29/14 - 5/3/14. Patient provided written diet, activities and medical management for specified period. MAD 3.6 %   Average reading 252 mg/dL   Std Dev 49 mg/dL   % of time <70 mg/dL 0 %   % of time >140 mg/dL 100 %   Number of hypoglycemia episodes noted: 0   CGMS showed daytime and nocturnal hyperglycemia   Impression:   Daytime and nocturnal hyperglycemia . No documentation of U500 injections. See scanned i-Pro download   Recommendation:   Advised patient to reduce carbohydrate intake to no more than 30 grams with large meals. Add Invokana or [place]. Increase Humulin U500 by 5-10% or change Humulin U500 to be delivered via V-Go 20 and IC of 1:6    Review of Systems    Constitutional: Negative for weight loss and malaise/fatigue. Negative for fever   Eyes: Negative for blurred vision. Negative for double vision, photophobia and pain. Respiratory: Negative for cough and sputum production. Cardiovascular: Negative for chest pain, palpitations and leg swelling. Gastrointestinal: Negative for nausea, vomiting and abdominal pain. Genitourinary: Negative for dysuria, urgency and frequency. Musculoskeletal: Negative for back pain. No joint pain  Skin: Negative for itching and rash. Neurological: Negative for dizziness. Negative for tingling, tremors, focal weakness and headaches.  +neuropathy  Endo/Heme/Allergies: see HPI        PHYSICAL EXAMINATION:  [ INSTRUCTIONS:  \"[x]\" Indicates a positive item  \"[]\" Indicates a negative item  -- DELETE ALL ITEMS NOT EXAMINED]  Vital Signs: (As obtained by patient/caregiver or practitioner observation)    Blood pressure-  Heart rate-    Respiratory rate-    Temperature-  Pulse oximetry-     Constitutional Appears well-developed and well-nourished No apparent distress        Mental status  Alert and awake  Oriented to person/place/time  Able to follow commands      Eyes:  EOM    [x]  Normal    Sclera  [x]  Normal           Discharge [x]  None visible      HENT:   [x] Normocephalic, atraumatic. [x] Mouth/Throat: Mucous membranes are moist.     External Ears [x] Normal      Neck: [x] No visualized mass     Pulmonary/Chest: [x] Respiratory effort normal.  [x] No visualized signs of difficulty breathing or respiratory distress             Musculoskeletal:   [x] Normal gait with no signs of ataxia         [x] Normal range of motion of neck              Neurological:        [x] No Facial Asymmetry (Cranial nerve 7 motor function) (limited exam to video visit)          [x] No gaze palsy                Skin:        [x] No significant exanthematous lesions or discoloration noted on facial skin                 Psychiatric:       [x] Normal Affect [x] No Hallucinations            Other pertinent observable physical exam findings-     Due to this being a TeleHealth encounter, evaluation of the following organ systems is limited: Vitals/Constitutional/EENT/Resp/CV/GI//MS/Neuro/Skin/Heme-Lymph-Imm. Services were provided through a video synchronous discussion virtually to substitute for in-person clinic visit.      5/19  Skeletal foot exam is normal, no skin lesions, toenails are normal, 10 g monofilament is 10/10, Dry feet    Lab Reviewed   No components found for: CHLPL  Lab Results   Component Value Date    TRIG 282 (H) 04/07/2022    TRIG 377 (H) 05/19/2021    TRIG 287 (H) 03/02/2020     Lab Results   Component Value Date    HDL 36 (L) 04/07/2022    HDL 29 (L) 05/19/2021    HDL 29 (L) 03/02/2020     Lab Results   Component Value Date    LDLCALC 105 (H) 04/07/2022    [address] see below 05/19/2021    [address] 03/02/2020     Lab Results   Component Value Date    LABVLDL see below 05/19/2021    LABVLDL 56 02/27/2019    LABVLDL see below 03/06/2018     Lab Results   Component Value Date    LABA1C 7.1 04/07/2022       Assessment:     Jany Dallas is a 48 y.o. female with :    1.T2DM:Longstanding, insulin resistant,fairly controlled. Given A1c and insulin requirements ,  switched to U-500 insulin. Added GLP agonist, did not help. On SGLT-2 inhibitor, check A1c , needs self monitoring . Reviewed log, fairly controlled, A1c slightly high, adjust dose  2. HTN;At goal per patient  3. HLD:LDL near goal, on lipitor/ fenofibrate  4. Obesity:Recommend aggressive life style and discussed bariatric option  5. SUNDAY: using Cpap machine more now  6. Breast Cancer: Had surgery, s/p chemotherapy,  XRT    Plan:       U-500  Change to 105/105/105   SSI 5 for 50>150    jardiance 25mg    Metformin ER 500mg BID   Discussed in detail use of U-500. Discussed side effects of hypoglcyemia   Advise to check blood sugar 2 times a day   Patient to send blood sugar log for titration. Advise to exercise regularly. Advise to low simple carbohydrate and protein with each  meal diet. Diabetes Care: recommend yearly eye exam, foot exam and urine microalbumin to   creatinine ratio.      -Hyperlipidemia, LDL goal is <100 mg/dl   -Hypertension:Continue current  -Daily ASA:Not indicated  -Smoking status:Non smoker    She is on U-500 insulin which has a narrow therapeutic index and high risk of complications including severe hypoglycemia

## 2022-06-08 LAB — NONINV COLON CA DNA+OCC BLD SCRN STL QL: NEGATIVE

## 2022-06-13 DIAGNOSIS — I10 ESSENTIAL HYPERTENSION: ICD-10-CM

## 2022-06-13 DIAGNOSIS — E78.49 OTHER HYPERLIPIDEMIA: ICD-10-CM

## 2022-06-13 RX ORDER — HYDROCHLOROTHIAZIDE 25 MG/1
25 TABLET ORAL DAILY
Qty: 90 TABLET | Refills: 0 | Status: SHIPPED | OUTPATIENT
Start: 2022-06-13 | End: 2022-10-07 | Stop reason: SDUPTHER

## 2022-06-13 RX ORDER — ATORVASTATIN CALCIUM 80 MG/1
80 TABLET, FILM COATED ORAL DAILY
Qty: 90 TABLET | Refills: 0 | Status: SHIPPED | OUTPATIENT
Start: 2022-06-13 | End: 2022-09-14 | Stop reason: SDUPTHER

## 2022-06-13 NOTE — TELEPHONE ENCOUNTER
Refill Request       Last Seen: Last Seen Department: 5/16/2022  Last Seen by PCP: 5/16/2022    Last Written: 03/07/2022  #90 w no refills     Next Appointment:   Future Appointments   Date Time Provider Jean-Paul Luong   8/5/2022  2:00 PM ELIASZ EG STEPHEN HERRERA EG STE[facility]             Requested Prescriptions     Pending Prescriptions Disp Refills    hydroCHLOROthiazide (HYDRODIURIL) 25 MG tablet 90 tablet 0

## 2022-06-13 NOTE — TELEPHONE ENCOUNTER
Refill Request     Last Seen: Last Seen Department: 5/16/2022  Last Seen by PCP: Visit date not found    Last Written:  05/09/2022  #30 w/ no refill     Next Appointment:   Future Appointments   Date Time Provider Jean-Paul Luong   8/5/2022  2:00 PM JAVIER HERRERA EG STEPHEN [place]           Requested Prescriptions     Pending Prescriptions Disp Refills    atorvastatin (LIPITOR) 80 MG tablet 30 tablet 0     Sig: Take 1 tablet by mouth daily `

## 2022-07-05 DIAGNOSIS — K21.9 GASTROESOPHAGEAL REFLUX DISEASE WITHOUT ESOPHAGITIS: ICD-10-CM

## 2022-07-05 DIAGNOSIS — E78.49 OTHER HYPERLIPIDEMIA: ICD-10-CM

## 2022-07-05 RX ORDER — FENOFIBRATE 145 MG/1
145 TABLET, COATED ORAL DAILY
Qty: 90 TABLET | Refills: 1 | OUTPATIENT
Start: 2022-07-05

## 2022-07-05 RX ORDER — OMEPRAZOLE 20 MG/1
20 CAPSULE, DELAYED RELEASE ORAL 2 TIMES DAILY
Qty: 180 CAPSULE | Refills: 1 | OUTPATIENT
Start: 2022-07-05

## 2022-07-05 RX ORDER — MAGNESIUM OXIDE 400 MG/1
400 TABLET ORAL 2 TIMES DAILY
Qty: 180 TABLET | Refills: 1 | Status: SHIPPED | OUTPATIENT
Start: 2022-07-05

## 2022-07-05 NOTE — TELEPHONE ENCOUNTER
Refill Request     Last Seen: Last Seen Department: 5/16/2022  Last Seen by PCP: 5/16/2022    Last Written: 01/06/22 #180 with 1      Next Appointment:   Future Appointments   Date Time Provider Jean-Paul Luong   8/5/2022  2:00 PM JAVIER [place]         Requested Prescriptions     Pending Prescriptions Disp Refills    magnesium oxide (MAG-OX) 400 MG tablet 180 tablet 1     Sig: Take 1 tablet by mouth 2 times daily

## 2022-07-11 DIAGNOSIS — E78.49 OTHER HYPERLIPIDEMIA: ICD-10-CM

## 2022-07-11 RX ORDER — FENOFIBRATE 145 MG/1
145 TABLET, COATED ORAL DAILY
Qty: 90 TABLET | Refills: 0 | Status: SHIPPED | OUTPATIENT
Start: 2022-07-11 | End: 2022-10-07 | Stop reason: SDUPTHER

## 2022-07-18 DIAGNOSIS — K21.9 GASTROESOPHAGEAL REFLUX DISEASE WITHOUT ESOPHAGITIS: ICD-10-CM

## 2022-07-18 RX ORDER — OMEPRAZOLE 20 MG/1
20 CAPSULE, DELAYED RELEASE ORAL 2 TIMES DAILY
Qty: 180 CAPSULE | Refills: 1 | Status: SHIPPED | OUTPATIENT
Start: 2022-07-18

## 2022-08-12 ENCOUNTER — HOSPITAL ENCOUNTER (OUTPATIENT)
Dept: WOMENS IMAGING | Age: 47
Discharge: HOME | End: 2022-08-12
Payer: COMMERCIAL

## 2022-08-12 DIAGNOSIS — I10 PRIMARY HYPERTENSION: ICD-10-CM

## 2022-08-12 DIAGNOSIS — E78.49 OTHER HYPERLIPIDEMIA: ICD-10-CM

## 2022-08-12 DIAGNOSIS — Z79.4 INSULIN DEPENDENT TYPE 2 DIABETES MELLITUS, CONTROLLED (HCC): ICD-10-CM

## 2022-08-12 DIAGNOSIS — C50.812 MALIGNANT NEOPLASM OF OVERLAPPING SITES OF LEFT FEMALE BREAST, UNSPECIFIED ESTROGEN RECEPTOR STATUS (HCC): ICD-10-CM

## 2022-08-12 DIAGNOSIS — E11.9 INSULIN DEPENDENT TYPE 2 DIABETES MELLITUS, CONTROLLED (HCC): ICD-10-CM

## 2022-08-12 LAB
A/G RATIO: 1.5 (ref 1.1–2.2)
ALBUMIN SERPL-MCNC: 4.5 G/DL (ref 3.4–5)
ALP BLD-CCNC: 73 U/L (ref 40–129)
ALT SERPL-CCNC: 49 U/L (ref 10–40)
ANION GAP SERPL CALCULATED.3IONS-SCNC: 17 MMOL/L (ref 3–16)
AST SERPL-CCNC: 45 U/L (ref 15–37)
BILIRUB SERPL-MCNC: <0.2 MG/DL (ref 0–1)
BUN BLDV-MCNC: 20 MG/DL (ref 7–20)
CALCIUM SERPL-MCNC: 10.6 MG/DL (ref 8.3–10.6)
CHLORIDE BLD-SCNC: 96 MMOL/L (ref 99–110)
CO2: 24 MMOL/L (ref 21–32)
CREAT SERPL-MCNC: 0.9 MG/DL (ref 0.6–1.1)
CREATININE URINE: 97.9 MG/DL (ref 28–259)
GFR AFRICAN AMERICAN: >60
GFR NON-AFRICAN AMERICAN: >60
GLUCOSE BLD-MCNC: 155 MG/DL (ref 70–99)
MICROALBUMIN UR-MCNC: <1.2 MG/DL
MICROALBUMIN/CREAT UR-RTO: NORMAL MG/G (ref 0–30)
POTASSIUM SERPL-SCNC: 4.7 MMOL/L (ref 3.5–5.1)
SODIUM BLD-SCNC: 137 MMOL/L (ref 136–145)
TOTAL PROTEIN: 7.6 G/DL (ref 6.4–8.2)

## 2022-08-12 PROCEDURE — G0279 TOMOSYNTHESIS, MAMMO: HCPCS

## 2022-08-13 LAB
ESTIMATED AVERAGE GLUCOSE: 151.3 MG/DL
HBA1C MFR BLD: 6.9 %

## 2022-08-31 RX ORDER — LANCETS 33 GAUGE
EACH MISCELLANEOUS
Qty: 400 EACH | Refills: 3 | Status: SHIPPED | OUTPATIENT
Start: 2022-08-31

## 2022-08-31 NOTE — TELEPHONE ENCOUNTER
Refill Request       Last Seen: Last Seen Department: 5/16/2022  Last Seen by PCP: 5/16/2022    Last Written: 8/20/21, #400, 3 REFILLS    Next Appointment:   Future Appointments   Date Time Provider Jean-Paul Luong   8/18/2023  1:30 PM JAVIER HERRERA EG STEPHEN [place]         Requested Prescriptions     Pending Prescriptions Disp Refills    AgaMatrix Ultra-Thin Lancets MISC [Pharmacy Med Name: AGAMATRIX ULTRA-THIN LANCETS  MISC] 400 each 3     Sig: CHECK BLOOD SUGARS BEFORE EVERY MEAL AND AT BEDTIME

## 2022-09-14 DIAGNOSIS — E78.49 OTHER HYPERLIPIDEMIA: ICD-10-CM

## 2022-09-15 DIAGNOSIS — Z79.4 INSULIN DEPENDENT TYPE 2 DIABETES MELLITUS, CONTROLLED (HCC): ICD-10-CM

## 2022-09-15 DIAGNOSIS — E11.9 INSULIN DEPENDENT TYPE 2 DIABETES MELLITUS, CONTROLLED (HCC): ICD-10-CM

## 2022-09-15 RX ORDER — ATORVASTATIN CALCIUM 80 MG/1
80 TABLET, FILM COATED ORAL DAILY
Qty: 90 TABLET | Refills: 0 | Status: SHIPPED | OUTPATIENT
Start: 2022-09-15

## 2022-09-15 RX ORDER — PEN NEEDLE, DIABETIC 32GX 5/32"
NEEDLE, DISPOSABLE MISCELLANEOUS
Qty: 100 EACH | Refills: 2 | Status: SHIPPED | OUTPATIENT
Start: 2022-09-15

## 2022-10-07 DIAGNOSIS — E78.49 OTHER HYPERLIPIDEMIA: ICD-10-CM

## 2022-10-07 DIAGNOSIS — I10 ESSENTIAL HYPERTENSION: ICD-10-CM

## 2022-10-07 NOTE — TELEPHONE ENCOUNTER
Refill Request       Last Seen: Last Seen Department: 5/16/2022  Last Seen by PCP: 5/16/2022    Last Written: 6/13    Next Appointment:   Future Appointments   Date Time Provider Jean-Paul Luong   10/27/2022  3:00 PM Juana Sandoval MD [facility]   1/9/2023  3:20 PM MD Randa Pelayo [facility]   8/18/2023  1:30 PM [facility] EG WC [facility]MO [facility] EG [facility]       Future appointment scheduled      Requested Prescriptions     Pending Prescriptions Disp Refills    hydroCHLOROthiazide (HYDRODIURIL) 25 MG tablet 90 tablet 0     Sig: Take 1 tablet by mouth daily

## 2022-10-07 NOTE — TELEPHONE ENCOUNTER
Medication:   Requested Prescriptions     Pending Prescriptions Disp Refills    fenofibrate (TRICOR) 145 MG tablet 90 tablet 0     Sig: Take 1 tablet by mouth daily        Last Filled:      Patient Phone Number: [phone] (home) [phone] (work)    Last appt: 11/20/2020   Next appt: Visit date not found    Last OARRS:   RX Monitoring 4/12/2021   Attestation -   Periodic Controlled Substance Monitoring No signs of potential drug abuse or diversion identified. Home

## 2022-10-09 RX ORDER — HYDROCHLOROTHIAZIDE 25 MG/1
25 TABLET ORAL DAILY
Qty: 90 TABLET | Refills: 0 | Status: SHIPPED | OUTPATIENT
Start: 2022-10-09

## 2022-10-09 RX ORDER — FENOFIBRATE 145 MG/1
145 TABLET, COATED ORAL DAILY
Qty: 90 TABLET | Refills: 0 | Status: SHIPPED | OUTPATIENT
Start: 2022-10-09

## 2022-10-14 NOTE — TELEPHONE ENCOUNTER
Medication:   Requested Prescriptions     Pending Prescriptions Disp Refills    empagliflozin (JARDIANCE) 25 MG tablet 90 tablet 1     Sig: TAKE 1 TABLET BY MOUTH ONE TIME A DAY       Last Filled:      Patient Phone Number: [phone] (home) [phone] (work)    Last appt: 6/3/2022   Next appt: 1/9/2023    Last Labs DM:   Lab Results   Component Value Date/Time    LABA1C 6.9 08/12/2022 03:31 PM

## 2022-10-21 DIAGNOSIS — Z79.4 INSULIN DEPENDENT TYPE 2 DIABETES MELLITUS, CONTROLLED (HCC): Primary | ICD-10-CM

## 2022-10-21 DIAGNOSIS — E11.9 INSULIN DEPENDENT TYPE 2 DIABETES MELLITUS, CONTROLLED (HCC): Primary | ICD-10-CM

## 2022-10-21 RX ORDER — METFORMIN HYDROCHLORIDE 500 MG/1
TABLET, EXTENDED RELEASE ORAL
Qty: 180 TABLET | Refills: 1 | Status: SHIPPED | OUTPATIENT
Start: 2022-10-21

## 2022-10-21 NOTE — TELEPHONE ENCOUNTER
Medication:   Requested Prescriptions     Pending Prescriptions Disp Refills    metFORMIN (GLUCOPHAGE-XR) 500 MG extended release tablet 180 tablet 1     Sig: TAKE 1 TABLET BY MOUTH 2 TIMES A DAY       Last Filled:      Patient Phone Number: [phone] (home) [phone] (work)    Last appt: 6/3/2022   Next appt: 1/9/2023    Last Labs DM:   Lab Results   Component Value Date/Time    LABA1C 6.9 08/12/2022 03:31 PM

## 2022-10-27 ENCOUNTER — OFFICE VISIT (OUTPATIENT)
Dept: PRIMARY CARE CLINIC | Age: 47
End: 2022-10-27
Payer: COMMERCIAL

## 2022-10-27 VITALS
HEART RATE: 120 BPM | TEMPERATURE: 96.6 F | WEIGHT: 293 LBS | SYSTOLIC BLOOD PRESSURE: 132 MMHG | DIASTOLIC BLOOD PRESSURE: 78 MMHG | HEIGHT: 62 IN | OXYGEN SATURATION: 98 % | BODY MASS INDEX: 53.92 KG/M2

## 2022-10-27 DIAGNOSIS — I10 PRIMARY HYPERTENSION: ICD-10-CM

## 2022-10-27 DIAGNOSIS — Z99.89 OSA ON CPAP: ICD-10-CM

## 2022-10-27 DIAGNOSIS — E78.49 OTHER HYPERLIPIDEMIA: ICD-10-CM

## 2022-10-27 DIAGNOSIS — Z79.4 INSULIN DEPENDENT TYPE 2 DIABETES MELLITUS, CONTROLLED (HCC): ICD-10-CM

## 2022-10-27 DIAGNOSIS — G47.33 OSA ON CPAP: ICD-10-CM

## 2022-10-27 DIAGNOSIS — T75.3XXA MOTION SICKNESS, INITIAL ENCOUNTER: ICD-10-CM

## 2022-10-27 DIAGNOSIS — C50.812 MALIGNANT NEOPLASM OF OVERLAPPING SITES OF LEFT BREAST IN FEMALE, ESTROGEN RECEPTOR NEGATIVE (HCC): Primary | ICD-10-CM

## 2022-10-27 DIAGNOSIS — E83.42 HYPOMAGNESEMIA: ICD-10-CM

## 2022-10-27 DIAGNOSIS — K21.9 GASTROESOPHAGEAL REFLUX DISEASE WITHOUT ESOPHAGITIS: ICD-10-CM

## 2022-10-27 DIAGNOSIS — Z17.1 MALIGNANT NEOPLASM OF OVERLAPPING SITES OF LEFT BREAST IN FEMALE, ESTROGEN RECEPTOR NEGATIVE (HCC): Primary | ICD-10-CM

## 2022-10-27 DIAGNOSIS — E11.9 INSULIN DEPENDENT TYPE 2 DIABETES MELLITUS, CONTROLLED (HCC): ICD-10-CM

## 2022-10-27 DIAGNOSIS — F40.243 FLYING PHOBIA: ICD-10-CM

## 2022-10-27 PROCEDURE — 3078F DIAST BP <80 MM HG: CPT | Performed by: FAMILY MEDICINE

## 2022-10-27 PROCEDURE — 3074F SYST BP LT 130 MM HG: CPT | Performed by: FAMILY MEDICINE

## 2022-10-27 PROCEDURE — 99214 OFFICE O/P EST MOD 30 MIN: CPT | Performed by: FAMILY MEDICINE

## 2022-10-27 PROCEDURE — 3044F HG A1C LEVEL LT 7.0%: CPT | Performed by: FAMILY MEDICINE

## 2022-10-27 PROCEDURE — 90471 IMMUNIZATION ADMIN: CPT | Performed by: FAMILY MEDICINE

## 2022-10-27 PROCEDURE — 90715 TDAP VACCINE 7 YRS/> IM: CPT | Performed by: FAMILY MEDICINE

## 2022-10-27 RX ORDER — DIAZEPAM 2 MG/1
TABLET ORAL
Qty: 5 TABLET | Refills: 0 | Status: SHIPPED | OUTPATIENT
Start: 2022-10-27 | End: 2022-11-17

## 2022-10-27 RX ORDER — MECLIZINE HYDROCHLORIDE 25 MG/1
25 TABLET ORAL 3 TIMES DAILY PRN
Qty: 30 TABLET | Refills: 0 | Status: SHIPPED | OUTPATIENT
Start: 2022-10-27 | End: 2022-11-06

## 2022-10-27 ASSESSMENT — PATIENT HEALTH QUESTIONNAIRE - PHQ9
2. FEELING DOWN, DEPRESSED OR HOPELESS: 0
SUM OF ALL RESPONSES TO PHQ QUESTIONS 1-9: 0
1. LITTLE INTEREST OR PLEASURE IN DOING THINGS: 0
SUM OF ALL RESPONSES TO PHQ QUESTIONS 1-9: 0
SUM OF ALL RESPONSES TO PHQ QUESTIONS 1-9: 0
SUM OF ALL RESPONSES TO PHQ9 QUESTIONS 1 & 2: 0
SUM OF ALL RESPONSES TO PHQ QUESTIONS 1-9: 0

## 2022-10-27 ASSESSMENT — ANXIETY QUESTIONNAIRES
IF YOU CHECKED OFF ANY PROBLEMS ON THIS QUESTIONNAIRE, HOW DIFFICULT HAVE THESE PROBLEMS MADE IT FOR YOU TO DO YOUR WORK, TAKE CARE OF THINGS AT HOME, OR GET ALONG WITH OTHER PEOPLE: SOMEWHAT DIFFICULT
6. BECOMING EASILY ANNOYED OR IRRITABLE: 0
5. BEING SO RESTLESS THAT IT IS HARD TO SIT STILL: 0
GAD7 TOTAL SCORE: 3
3. WORRYING TOO MUCH ABOUT DIFFERENT THINGS: 1
4. TROUBLE RELAXING: 0
7. FEELING AFRAID AS IF SOMETHING AWFUL MIGHT HAPPEN: 1
2. NOT BEING ABLE TO STOP OR CONTROL WORRYING: 1
1. FEELING NERVOUS, ANXIOUS, OR ON EDGE: 0

## 2022-10-27 NOTE — PROGRESS NOTES
Lab preformed in R arm and sent number of tubes below to be processed. 1 attempt made and stopped bleeding by applying band aide and guaze.      1 Red    0  purple    0 blue    0 Urine

## 2022-10-27 NOTE — PROGRESS NOTES
PROGRESS NOTE     Corwin Easton MD  [address] [facility]                                  [address] [facility], [address], [address] [place] [address]         Phone: [phone]    Date of Service:  10/27/2022     Patient ID: Roberta Fajardo is a 48 y.o. female      Assessment / Plan:       1. DM type 2 with diabetic dyslipidemia ([facility]  Recent A1c =6.9 on 8/12/22.    - insulin managed by endocrine, dr Marcella Devine. Pt states her U-500. Pt to continue metformin, jardiance     --pt on ASA, statin and ACEI     Requesting diabetic eye exam records again.  ([facility]) many times, and never received    Recent kidney function is WNls       2. Hyperlipidemia  Well controlled on meds. Continue (tricor 145, lipitor 80mg). 3. Hypertension  at goal is <130/80. Les Pimple Continue meds. (HCTZ 25, lisinopril 40mg)      4. SUNDAY on CPAP  Well controlled. Continue CPAP. 5. GERD (gastroesophageal reflux disease)/hypomagnesium  Well controlled, continue ppi. Checking magnesium level to r/o malabsorption       6. Malignant neoplasm of overlapping sites of left breast in female, estrogen receptor negative ([facility] [address]  Summarized below a status post unilateral mastectomy, and is currently in the middle chemotherapy. Patient finished chemotherapy and then do radiation. She states her prognosis is very good. HM:     6/12/19 normal pap with neg HPV:      Cologuard neg 6/2/22    Flu vaccine and COVID booster recently received at [facility] per pt    Tdap vaccine given today with VIS form. Subjective:     CC:  Breast cancer left breast (estrogen receptor neg),  DM2, HTN, HLD, SUNDAY, GERD, hypomagnesium    HPI    40-year-old white female here for reevaluation of the above. She underwent a left partial mastectomy and sentinel lymph node biopsy as well as right port-a-catheter placement on 7/6/2021.   She since has since completed radiation and chemo treatment. She still follows up with heme/onc, radiation oncology and breast surgeon. Patient states her appetite is back now that she has finished chemotherapy, her hair is growing back as well. Saw Dr Luis Sanabria via [facility] [address] June 2022: Managing her concentrated insulin. Pt on jardiance and metformin as well. No signs of hypoglycemia. Patient states she tries to follow a diabetic diet, does not currently get much exercise. Wears cpap machine  and feels that it is managing her symptoms. She states her GERD is well controlled.         Lab Results   Component Value Date    LABA1C 6.9 08/12/2022    LABA1C 7.1 04/07/2022    LABA1C 6.9 03/07/2022     Lab Results   Component Value Date    LABMICR <1.20 08/12/2022    CREATININE 0.9 08/12/2022     Lab Results   Component Value Date    ALT 49 (H) 08/12/2022    AST 45 (H) 08/12/2022     Lab Results   Component Value Date    CHOL 197 04/07/2022    TRIG 282 (H) 04/07/2022    HDL 36 (L) 04/07/2022    LDLCALC 105 (H) 04/07/2022    LDLDIRECT 93 05/19/2021          Lab Results   Component Value Date/Time    MG 1.60 06/30/2021 08:47 AM         ROS:  Constitutional:  Negative fever or fatigue   Eyes:  Negative for eye pain or visual changes  Resp:  Negative for SOB, chest tightness, cough  Cardiovascular: Negative for CP, palpitations, LOMBARDO, orthopnea, PND, LE edema  Gastrointestinal: Negative for abd pain, melena, BRBPR, N/V/D  Endocrine:  Negative for polydipsia and polyuria  :  Negative for dysuria, flank pain or urinary frequency  Musculoskeletal:  Negative for back pain or myalgias  Neuro:  Negative for dizziness or lightheadedness  Psych: negative for depression or anxiety      Vitals:    10/27/22 1453   BP: 132/78   Site: Left Upper Arm   Position: Sitting   Cuff Size: Large Adult   Pulse: (!) 120   Temp: (!) 96.6 °F (35.9 °C)   TempSrc: Temporal   SpO2: 98%   Weight: 293 lb (132.9 kg)   Height: 5' 2\" (1.575 m)       Outpatient Medications Marked as Taking for the 10/27/22 encounter (Office Visit) with Felecia Lopes MD   Medication Sig Dispense Refill    metFORMIN (GLUCOPHAGE-XR) 500 MG extended release tablet TAKE 1 TABLET BY MOUTH 2 TIMES A  tablet 1    empagliflozin (JARDIANCE) 25 MG tablet TAKE 1 TABLET BY MOUTH ONE TIME A DAY 90 tablet 1    fenofibrate (TRICOR) 145 MG tablet Take 1 tablet by mouth daily 90 tablet 0    hydroCHLOROthiazide (HYDRODIURIL) 25 MG tablet Take 1 tablet by mouth daily 90 tablet 0    atorvastatin (LIPITOR) 80 MG tablet Take 1 tablet by mouth daily ` 90 tablet 0    Insulin Pen Needle (TRUEPLUS PEN NEEDLES) 32G X 4 MM MISC USE THREE TIMES A  each 2    AgaMatrix Ultra-Thin Lancets MISC CHECK BLOOD SUGARS BEFORE EVERY MEAL AND AT BEDTIME 400 each 3    omeprazole (PRILOSEC) 20 MG delayed release capsule Take 1 capsule by mouth in the morning and 1 capsule before bedtime.  180 capsule 1    magnesium oxide (MAG-OX) 400 MG tablet Take 1 tablet by mouth 2 times daily 180 tablet 1    insulin regular human (HUMULIN R U-500 KWIKPEN) 500 UNIT/ML SOPN concentrated injection pen INJECT 100- 120 units before meals 24 each 2    loratadine (CLARITIN) 10 MG tablet Take 10 mg by mouth 2 times daily      lisinopril (PRINIVIL;ZESTRIL) 40 MG tablet TAKE 1 TABLET BY MOUTH ONE TIME A DAY 90 tablet 0    blood glucose test strips (AGAMATRIX PRESTO TEST) strip USE TO TEST BLOOD SUGARS FOUR TIMES DAILY 400 strip 3    ASPIRIN ADULT LOW STRENGTH 81 MG EC tablet TAKE 1 TABLET BY MOUTH ONE TIME A  tablet 3    amitriptyline (ELAVIL) 50 MG tablet TAKE 1 TABLET BY MOUTH DAILY               Objective:   Constitutional:   Reviewed vitals above  Well Nourished, well developed, no distress       HENT:  Normal external nose without lesions  Lots of dental decay  Normal nasal mucosa without swelling or erythema  Neck:  Symmetric and without masses  No thyromegaly  Resp:  Normal effort  Clear to auscultation bilaterally without rhonchi, wheezing or crackles  Cardiovascular: On auscultation, normal S1 and S2 without murmurs, rubs or gallops  No bruits of bilateral carotids and no JVD  Gastrointestinal:  Nontender, nondistended, and no masses  No hepatosplenomegaly  Musculoskeletal:  Normal Gait  All extremities without clubbing, cyanosis or edema  Skin:  No rashes on inspection  No areas of increased heat or induration on palpation  Psych:  Normal mood and affect  Normal insight and judgement      Diabetic foot exam:   Left Foot:   Visual Exam: normal   Pulse DP: 2+ (normal)   Filament test: normal sensation     Right Foot:   Visual Exam: normal   Pulse DP: 2+ (normal)   Filament test: normal sensation         EMR Dragon/transcription disclaimer:  Much of this encounter note is electronic transcription/translation of spoken language to printed texts. The electronic translation of spoken language may be erroneous, or at times, nonsensical words or phrases may be inadvertently transcribed.   Although I have reviewed the note for such errors, some may still exist.

## 2022-10-28 LAB — MAGNESIUM: 1.8 MG/DL (ref 1.8–2.4)

## 2022-12-04 DIAGNOSIS — E78.5 DM TYPE 2 WITH DIABETIC DYSLIPIDEMIA (HCC): ICD-10-CM

## 2022-12-04 DIAGNOSIS — E11.69 DM TYPE 2 WITH DIABETIC DYSLIPIDEMIA (HCC): ICD-10-CM

## 2022-12-05 RX ORDER — ASPIRIN 81 MG/1
TABLET, COATED ORAL
Qty: 100 TABLET | Refills: 3 | Status: SHIPPED | OUTPATIENT
Start: 2022-12-05

## 2022-12-05 NOTE — TELEPHONE ENCOUNTER
NOV- 01/2023        Requested Prescriptions     Pending Prescriptions Disp Refills    ASPIRIN LOW DOSE 81 MG EC tablet [Pharmacy Med Name: ASPIRIN LOW DOSE 81MG TBEC] 100 tablet 3     Sig: TAKE 1 TABLET BY MOUTH ONE TIME A DAY

## 2022-12-18 DIAGNOSIS — E78.49 OTHER HYPERLIPIDEMIA: ICD-10-CM

## 2022-12-18 DIAGNOSIS — I10 ESSENTIAL HYPERTENSION: ICD-10-CM

## 2022-12-19 RX ORDER — LISINOPRIL 40 MG/1
TABLET ORAL
Qty: 90 TABLET | Refills: 0 | Status: SHIPPED | OUTPATIENT
Start: 2022-12-19

## 2022-12-19 RX ORDER — ATORVASTATIN CALCIUM 80 MG/1
TABLET, FILM COATED ORAL
Qty: 90 TABLET | Refills: 0 | Status: SHIPPED | OUTPATIENT
Start: 2022-12-19

## 2022-12-19 NOTE — TELEPHONE ENCOUNTER
Refill Request       Last Seen: Last Seen Department: 10/27/2022  Last Seen by PCP: 10/27/2022    Last Written: 9/15, 2/14    Next Appointment: due 4/23  Future Appointments   Date Time Provider Jean-Paul Luong   1/9/2023  3:20 PM MD Randa Alonso   8/18/2023  1:30 PM [facility] EG WC M[facility]MO [facility] EG WC French Rad             Requested Prescriptions     Pending Prescriptions Disp Refills    atorvastatin (LIPITOR) 80 MG tablet [Pharmacy Med Name: ATORVASTATIN CALCIUM 80MG TABS] 90 tablet 0     Sig: TAKE 1 TABLET BY MOUTH ONCE DAILY    lisinopril (PRINIVIL;ZESTRIL) 40 MG tablet [Pharmacy Med Name: LISINOPRIL 40MG TABS] 90 tablet 0     Sig: TAKE 1 TABLET BY MOUTH ONE TIME A DAY

## 2023-01-06 DIAGNOSIS — I10 ESSENTIAL HYPERTENSION: ICD-10-CM

## 2023-01-06 DIAGNOSIS — E78.49 OTHER HYPERLIPIDEMIA: ICD-10-CM

## 2023-01-06 RX ORDER — FENOFIBRATE 145 MG/1
145 TABLET, COATED ORAL DAILY
Qty: 90 TABLET | Refills: 1 | Status: SHIPPED | OUTPATIENT
Start: 2023-01-06

## 2023-01-06 RX ORDER — HYDROCHLOROTHIAZIDE 25 MG/1
25 TABLET ORAL DAILY
Qty: 90 TABLET | Refills: 0 | Status: SHIPPED | OUTPATIENT
Start: 2023-01-06

## 2023-01-06 NOTE — TELEPHONE ENCOUNTER
Refill Request       Last Seen: Last Seen Department: 10/27/2022  Last Seen by PCP: 10/27/2022    Last Written: 10/9/2022 #90 with no refills     Next Appointment:   Future Appointments   Date Time Provider Jean-Paul Luong   1/9/2023  3:20 PM MD Randa Mills   8/18/2023  1:30 PM [facility] EG WC MAMMO [facility] EG WC Oliver Rad             Requested Prescriptions     Pending Prescriptions Disp Refills    hydroCHLOROthiazide (HYDRODIURIL) 25 MG tablet 90 tablet 0     Sig: Take 1 tablet by mouth daily

## 2023-01-09 ENCOUNTER — OFFICE VISIT (OUTPATIENT)
Dept: ENDOCRINOLOGY | Age: 48
End: 2023-01-09
Payer: COMMERCIAL

## 2023-01-09 VITALS
SYSTOLIC BLOOD PRESSURE: 123 MMHG | TEMPERATURE: 98 F | DIASTOLIC BLOOD PRESSURE: 71 MMHG | HEART RATE: 106 BPM | WEIGHT: 293 LBS | HEIGHT: 62 IN | RESPIRATION RATE: 14 BRPM | BODY MASS INDEX: 53.92 KG/M2

## 2023-01-09 DIAGNOSIS — E78.5 DM TYPE 2 WITH DIABETIC DYSLIPIDEMIA (HCC): Primary | ICD-10-CM

## 2023-01-09 DIAGNOSIS — E11.69 DM TYPE 2 WITH DIABETIC DYSLIPIDEMIA (HCC): Primary | ICD-10-CM

## 2023-01-09 DIAGNOSIS — E11.9 INSULIN DEPENDENT TYPE 2 DIABETES MELLITUS, CONTROLLED (HCC): ICD-10-CM

## 2023-01-09 DIAGNOSIS — Z79.4 INSULIN DEPENDENT TYPE 2 DIABETES MELLITUS, CONTROLLED (HCC): ICD-10-CM

## 2023-01-09 LAB — HBA1C MFR BLD: 6.9 %

## 2023-01-09 PROCEDURE — 3044F HG A1C LEVEL LT 7.0%: CPT | Performed by: INTERNAL MEDICINE

## 2023-01-09 PROCEDURE — 3074F SYST BP LT 130 MM HG: CPT | Performed by: INTERNAL MEDICINE

## 2023-01-09 PROCEDURE — 83036 HEMOGLOBIN GLYCOSYLATED A1C: CPT | Performed by: INTERNAL MEDICINE

## 2023-01-09 PROCEDURE — 3078F DIAST BP <80 MM HG: CPT | Performed by: INTERNAL MEDICINE

## 2023-01-09 PROCEDURE — 99214 OFFICE O/P EST MOD 30 MIN: CPT | Performed by: INTERNAL MEDICINE

## 2023-01-09 RX ORDER — INSULIN HUMAN 500 [IU]/ML
INJECTION, SOLUTION SUBCUTANEOUS
Qty: 24 EACH | Refills: 2 | Status: SHIPPED | OUTPATIENT
Start: 2023-01-09

## 2023-01-09 RX ORDER — METFORMIN HYDROCHLORIDE 500 MG/1
TABLET, EXTENDED RELEASE ORAL
Qty: 180 TABLET | Refills: 1 | Status: SHIPPED | OUTPATIENT
Start: 2023-01-09

## 2023-01-09 NOTE — PROGRESS NOTES
Seen as f/u patient for diabetes      Interim:     Stable  No issues  Working from home    Dx with breast cancer  went through chemotherapy XRT  Has steroids with it  Working from home    Diagnosed with Type 2 diabetes mellitus 4-5 yrs ago  Known diabetic complications: None    Current diabetic medications   U-500 pen 120 TID     SSI 5 for 50 >150     Metformin ER  500mg BID  jardiance 25mg    Previous  lantus 110 units HS  Novolog 25 TID + SSI 5 for 50>150  Usually takes 35-40  januvia 100mg  Was on victoza in the past for 2 months  Bydureon    Moderate, uncontrolled    Insulin started 3/13    Intolerance to diabetes medications: yes - Metformin     Last A1c 6.9%<--- 6.9%<---7.1%<---- 7.4%<-----7.5%<------ 6.9%<-----7%<------6.6%<------6.6%<----- 6.8%<-----6.3 on 3/17<----6.3 on 12/16<----6.1 on 3/15  <---- 8.5 On 12/13<---- 9.6<---- 8.9 on 5/13<---10.8<---10.9    Prior visit with dietician: Yes   Current diet: on average, 3 meals per day 45gm CHO  Current exercise: No exercise for one month  Current monitoring regimen: home blood tests occ , mainly in evening    Has brought blood glucose log/meter:yes  Home blood sugar records:  Any episodes of hypoglycemia? occ    No Hx of CAD , PVD, CVA    Hyperlipidemia: pravastatin 40mg fenofibrate 145   on 10/14-switched to lipitor 80mg LDL 73 on 2/16  LDL 96  HDL 29 on 3/18     LDL 82 on 2/19  Tolerating , no aches  LDL 76 on 3/20  LDL 93 on 5/21       Last eye exam: due  Last foot exam:1/23  Last microalbumin to creatinine ratio: M/C nl on 5/19  ,   35 on 5/13 34 on 9/13  nl on 3/12---> 33.6 on 11/20----> 11/21 ---> 8/22    HTN: Stable, tolerating Lisinopril 40mg HCTZ 25mg    4/14  CGMS Report   CGMS insertion date 4/29/14 CGMS data collection was performed on 4/29/14 - 5/3/14. Patient provided written diet, activities and medical management for specified period.    MAD 3.6 %   Average reading 252 mg/dL   Std Dev 49 mg/dL   % of time <70 mg/dL 0 %   % of time >140 mg/dL 100 %   Number of hypoglycemia episodes noted: 0   CGMS showed daytime and nocturnal hyperglycemia   Impression:   Daytime and nocturnal hyperglycemia . No documentation of U500 injections. See scanned i-Pro download   Recommendation:   Advised patient to reduce carbohydrate intake to no more than 30 grams with large meals. Add Invokana or [place]. Increase Humulin U500 by 5-10% or change Humulin U500 to be delivered via V-Go 20 and IC of 1:6    Works from home    Review of Systems    Scanned, reviewed    Vitals:    01/09/23 1523   BP: 123/71   Pulse: (!) 106   Resp: 14   Temp: 98 °F (36.7 °C)       Constitutional: Well-developed, appears stated age, cooperative, in no acute distress  H/E/N/M/T:atraumatic, normocephalic, external ears, nose, lips normal without lesions  Eyes: Lids, lashes, conjunctivae and sclerae normal, No proptosis, no redness  Neck: supple, symmetrical, no swelling  Skin: No obvious rashes or lesions present.   Skin and hair texture normal  Psychiatric: Judgement and Insight:  judgement and insight appear normal  Neuro: Normal without focal findings, speech is normal normal, speech is spontaneous  Chest: No labored breathing, no chest deformity, no stridor  Musculoskeletal: No joint deformity, swelling      1/23  Skeletal foot exam is normal, no skin lesions, toenails are normal, 10 g monofilament is 10/10, Dry feet    Lab Reviewed   No components found for: CHLPL  Lab Results   Component Value Date    TRIG 282 (H) 04/07/2022    TRIG 377 (H) 05/19/2021    TRIG 287 (H) 03/02/2020     Lab Results   Component Value Date    HDL 36 (L) 04/07/2022    HDL 29 (L) 05/19/2021    HDL 29 (L) 03/02/2020     Lab Results   Component Value Date    LDLCALC 105 (H) 04/07/2022    LDLCALC see below 05/19/2021    LDLCALC 76 03/02/2020     Lab Results   Component Value Date    LABVLDL see below 05/19/2021    LABVLDL 56 02/27/2019    LABVLDL see below 03/06/2018     Lab Results   Component Value Date    LABA1C 6.9 08/12/2022       Assessment:     Laurie Celaya is a 50 y.o. female with :    1.T2DM:Longstanding, insulin resistant,fairly controlled. Given A1c and insulin requirements ,  switched to U-500 insulin. Added GLP agonist, did not help. On SGLT-2 inhibitor  Reviewed log, fairly controlled, A1c at goal  2. HTN;At goal per patient  3. HLD:LDL near goal, on lipitor/ fenofibrate  4. Obesity:Recommend aggressive life style and discussed bariatric option  5. SUNDAY: using Cpap machine more now  6. Breast Cancer: Had surgery, s/p chemotherapy,  XRT    Plan:       U-500  120/120/120   SSI 5 for 50>150    jardiance 25mg    Metformin ER 500mg BID   Discussed in detail use of U-500. Discussed side effects of hypoglcyemia   Advise to check blood sugar 2 times a day   Patient to send blood sugar log for titration. Advise to exercise regularly. Advise to low simple carbohydrate and protein with each  meal diet. Diabetes Care: recommend yearly eye exam, foot exam and urine microalbumin to   creatinine ratio.      -Hyperlipidemia, LDL goal is <100 mg/dl   -Hypertension:Continue current  -Daily ASA:Not indicated  -Smoking status:Non smoker    She is on U-500 insulin which has a narrow therapeutic index and high risk of complications including severe hypoglycemia

## 2023-01-12 ENCOUNTER — TELEPHONE (OUTPATIENT)
Dept: PHARMACY | Facility: CLINIC | Age: 48
End: 2023-01-12

## 2023-01-12 NOTE — TELEPHONE ENCOUNTER
2023 Annual Pharmacist Visit  **Patient is [facility]**    Called patient to schedule 2023 yearly pharmacist appointment to discuss medications for Diabetes Management Program.      No answer. Left VM on / TAD: Please call back at [phone] Option #3.      Naty Andres    Department, toll free: [phone] Option #3

## 2023-01-16 NOTE — TELEPHONE ENCOUNTER
Spoke to patient and appointment scheduled for 1/19/23 at 10:30AM      Aracelis Vincent CPhT.    [address]   Toll free: [phone] Option #3      For Pharmacy Admin Tracking Only    Program: [address]:  No  Recommendation Provided To: Patient/Caregiver: 1 via Telephone  Intervention Detail: Scheduled Appointment  Intervention Accepted By: Patient/Caregiver: 1  Gap Closed?: Yes   Time Spent (min): 10

## 2023-01-19 ENCOUNTER — TELEPHONE (OUTPATIENT)
Dept: PHARMACY | Facility: CLINIC | Age: 48
End: 2023-01-19

## 2023-01-19 DIAGNOSIS — I10 ESSENTIAL HYPERTENSION: ICD-10-CM

## 2023-01-19 RX ORDER — LISINOPRIL 20 MG/1
20 TABLET ORAL DAILY
Qty: 90 TABLET | Refills: 1 | OUTPATIENT
Start: 2023-01-19

## 2023-01-19 NOTE — TELEPHONE ENCOUNTER
As per other encounter, provider office requests appt be scheduled prior to any refill requests being approved, so request for lisinopril 20mg tablet declined at this time. PCP asks patient to schedule appt for April.     Mychart message to patient.    =======================================================   For Pharmacy Admin Tracking Only    Program: [address]:  No  Recommendation Provided To: Provider: 1 via Note to Provider  Intervention Detail: Refill(s) Provided  Intervention Accepted By: Provider: 0  Gap Closed?: Yes   Time Spent (min): 45

## 2023-01-19 NOTE — TELEPHONE ENCOUNTER
Cherise Berg MD,  Your patient is currently enrolled in [facility] Diabetes Program.   Please assist, orders pended for your signature/modification if you agree:  Update rx: lisinopril 20mg tab (to avoid splitting and past due refill appearance) - sts has been taking 20mg daily \"for years\", which is supported by refill history since at least 2019  (eligible for copay waiver to $0 for patient through [employer])    Thank you,  [facility] Oswald, PharmD, [facility]  Department, toll free: [phone], option 3

## 2023-01-19 NOTE — TELEPHONE ENCOUNTER
Kendra Earlariel changed her lisinopril dosage? Additionally, our office requires that patient has a follow up appointment prior to medication refill. She currently does not have a follow up appointment scheduled.   Since she just saw endocrinology, scheduling with myself in April would be fine

## 2023-01-19 NOTE — TELEPHONE ENCOUNTER
[facility] HEALTH CLINICAL PHARMACY REVIEW - [facility] with Diabetes    Valerie Thompson is a 50 y.o. female enrolled in the [address] [place] [address] Diabetes Program. Patient provided Pearly Smoker with verbal consent to remain in the program for this year. Patient enrolled 2018.     Insurance through the following employer: [employer]    Medications:  Current Outpatient Medications   Medication Instructions    AgaMatrix Ultra-Thin Lancets MISC CHECK BLOOD SUGARS BEFORE EVERY MEAL AND AT BEDTIME    amitriptyline (ELAVIL) 50 MG tablet TAKE 1 TABLET BY MOUTH DAILY    ASPIRIN LOW DOSE 81 MG EC tablet TAKE 1 TABLET BY MOUTH ONE TIME A DAY    atorvastatin (LIPITOR) 80 MG tablet TAKE 1 TABLET BY MOUTH ONCE DAILY    blood glucose test strips (AGAMATRIX PRESTO TEST) strip USE TO TEST BLOOD SUGARS FOUR TIMES DAILY    empagliflozin (JARDIANCE) 25 MG tablet TAKE 1 TABLET BY MOUTH ONE TIME A DAY    fenofibrate (TRICOR) 145 mg, Oral, DAILY    hydroCHLOROthiazide (HYDRODIURIL) 25 mg, Oral, DAILY    Insulin Pen Needle (TRUEPLUS PEN NEEDLES) 32G X 4 MM MISC    *Leader / $0 USE THREE TIMES A DAY    insulin regular human (HUMULIN R U-500 KWIKPEN) 500 UNIT/ML SOPN concentrated injection pen INJECT 100- 120 units before meals    lisinopril (PRINIVIL;ZESTRIL) 40 MG tablet TAKE 1 TABLET BY MOUTH ONE TIME A DAY    loratadine (CLARITIN) 10 mg, Oral, 2 TIMES DAILY    magnesium oxide (MAG-OX) 400 mg, Oral, 2 TIMES DAILY    metFORMIN (GLUCOPHAGE-XR) 500 MG extended release tablet TAKE 1 TABLET BY MOUTH 2 TIMES A DAY    omeprazole (PRILOSEC) 20 mg, Oral, 2 TIMES DAILY     Current Pharmacy: [facility]  Current testing supplies/frequency: Agamatrix Presto    Allergies:  No Known Allergies     Vitals/Labs:  BP Readings from Last 3 Encounters:   01/09/23 123/71   10/27/22 132/78   11/15/21 122/82     Lab Results   Component Value Date    LABMICR <1.20 08/12/2022     Lab Results   Component Value Date LABA1C 6.9 2023    LABA1C 6.9 2022    LABA1C 7.1 2022     Lab Results   Component Value Date    CHOL 197 2022    TRIG 282 (H) 2022    HDL 36 (L) 2022    LDLCALC 105 (H) 2022    LDLDIRECT 93 2021     ALT   Date Value Ref Range Status   2022 49 (H) 10 - 40 U/L Final     AST   Date Value Ref Range Status   2022 45 (H) 15 - 37 U/L Final     The 10-year ASCVD risk score (Lashell PATTERSON, et al., 2019) is: 4.6%    Values used to calculate the score:      Age: 50 years      Sex: Female      Is Non- : No      Diabetic: Yes      Tobacco smoker: No      Systolic Blood Pressure: 235 mmHg      Is BP treated: Yes      HDL Cholesterol: 36 mg/dL      Total Cholesterol: 197 mg/dL     Lab Results   Component Value Date    CREATININE 0.9 2022     Estimated Creatinine Clearance: 101 mL/min (based on SCr of 0.9 mg/dL). Immunizations:  Immunization History   Administered Date(s) Administered    COVID-19, MODERNA BLUE border, Primary or Immunocompromised, (age 12y+), IM, 100 mcg/0.5mL 2021, 2021    Influenza Vaccine, unspecified formulation 10/10/2016    Influenza Virus Vaccine 2017, 2018, 2018, 2019, 2020    Influenza, FLUCELVAX, (age 10 mo+), MDCK, PF, 0.5mL 11/15/2021    Pneumococcal Polysaccharide ([id number]) 2016    Tdap (Boostrix, Adacel) 2012, 10/27/2022      Social History:  Social History     Tobacco Use    Smoking status: Former     Packs/day: 1.00     Years: 18.00     Pack years: 18.00     Types: Cigarettes     Quit date: 2010     Years since quittin.3    Smokeless tobacco: Never   Substance Use Topics    Alcohol use: No     ASSESSMENT:  Initial Program Requirements (Y indicates has completed for the year, N indicates needs to be completed by 2023):   Yes - Provider Visit for DM (1st)  Yes- A1c (1st)      Ongoing Program Requirements (Y indicates has completed for the year, N indicates needs to be completed by 12/31/2023): No - Provider Visit for DM (2nd)  N/A - ACC/diabetes educator visit (if A1c over 8%)  No - A1c (2nd)  No - Lipid panel  No - Urine microalbumin  No - Pneumococcal vaccination: Dara Tadeo may be given  No - Influenza vaccination for Fall 2023  No - Medication adherence over 70%  Yes - On statin or contraindication(s)  - prescribed atorvastatin  Yes - On ACEi/ARB or contraindication(s)  - prescribed lisinopril      Formulary Medication Review:  Non-formulary or medications with cost-effective alternatives:  n/a. Current medications eligible for copay waiver, up to $600, through [address]:  - aspirin (with prescription), atorvastatin, fenofibrate, hydrochlorothiazide, Humulin R, Jardiance, lisinopril, metformin  - Agamatrix     Diabetes Care:   - Glycemic Goal: <7.0% and directed by provider. Is at blood glucose goal. Type 2 DM under acceptable control as evidenced by A1c stable @7. F/b endocrinology - currently taking Humulin R U-500 120 TID plus sliding scale, metformin ER 500mg BID and Jardiance 25mg daily.  - Home blood sugar records:  Tests @BID (no noted test strip refills, only lancets, in past year in [place] - patient states she had a large surplus and is down to about a box and a half)  - Any episodes of hypoglycemia? No, lowest 99  - Eye exam current (within one year): no - states she typically does, but had a conflict arise last year and plans to reschedule soon  - Foot exam current (within one year): yes  - Reduce Pill [place]: Synjardy XR not available in 12.5-500mg tabs (Synjardy is, but likely avoid since patient with previous metformin intolerances)  - Medications/Classes already tried/failed: could not tolerate higher doses of metformin; prev trial of GLP1, but no improvement per endo notes  - Daily aspirin?  Yes - no ASCVD, rx by endo, denies bruising/bleeding  - Medication compliance: compliant all of the time    Other Considerations:  - Blood Pressure Goal: BP less than 130/80 mmHg due to history of DM: Is at blood pressure goal. States has been taking lisinopril 20mg (HALF of 40mg tab) for years. - Lipids: taking high-intensity statin (atorva 80mg)  - Smoking status:  former    PLAN:  - Consideration(s) for provider:   Update lisinopril rx to 20mg tablet to avoid splitting and past due refill appearance  Patient would appreciate, sts Mychart best way to notify her re rx  See refill encounter to PCP  Consider [id number] - office or pharmacy  CGM - briefly discussed the technology and coverage/prior authorization. Patient states she has previously reviewed and discussed with endo - patient doesn't think is necessary at this time.    - DM program gaps identified:   Initial requirements: Requirements met   Ongoing requirements: Provider visit for DM (2nd), A1c (2nd), Lipid panel, Urine microalbumin, Pneumococcal vaccination: [id number], Influenza vaccination for 2100-0401, and Medication adherence over 70%   - Education to patient: Reminded to get yearly retinal exam, Benefit/indication for pneumonia vaccine in patients with diabetes, and Reminder A1c and lipid panel can be completed for free at [facility] screenings     - Upcoming appointments:   Future Appointments   Date Time Provider Jean-Paul Luong   4/11/2023  2:40 PM MD Randa Negrete   8/18/2023  1:30 PM [address] EG WC ADITYA GRIFFINZ EG STEPHEN Akers, PharmD, [facility]  Department, toll free: [phone], option 3

## 2023-01-19 NOTE — TELEPHONE ENCOUNTER
Hi Dr. Tracy, she thought it was you who changed lisinopril to 20mg, and could not recall when or why, just that it's been years she's been taking only half a tablet, which is supported by her refill history since at least 2019. I will let her know to schedule with you in April. Thank you for reviewing.

## 2023-01-20 DIAGNOSIS — Z79.4 INSULIN DEPENDENT TYPE 2 DIABETES MELLITUS, CONTROLLED (HCC): ICD-10-CM

## 2023-01-20 DIAGNOSIS — K21.9 GASTROESOPHAGEAL REFLUX DISEASE WITHOUT ESOPHAGITIS: ICD-10-CM

## 2023-01-20 DIAGNOSIS — E11.9 INSULIN DEPENDENT TYPE 2 DIABETES MELLITUS, CONTROLLED (HCC): ICD-10-CM

## 2023-01-20 RX ORDER — PEN NEEDLE, DIABETIC 32GX 5/32"
NEEDLE, DISPOSABLE MISCELLANEOUS
Qty: 300 EACH | Refills: 3 | Status: SHIPPED | OUTPATIENT
Start: 2023-01-20

## 2023-01-20 NOTE — TELEPHONE ENCOUNTER
Refill Request       Last Seen: Last Seen Department: 10/27/2022  Last Seen by PCP: 10/27/2022    Last Written: magnesium oxide-07/05/2022 #180 wit 0 refills   Omeprazole- 07/18/2022  #180 with refill    Next Appointment:   Future Appointments   Date Time Provider Jean-Paul Luong   4/6/2023  3:30 PM Juana Freeman MD [facility] AND [facility]   4/11/2023  2:40 PM MD Randa Henry [facility]   8/18/2023  1:30 PM [facility] EG WC MAMMO [facility] EG [facility] [place] [facility]             Requested Prescriptions     Pending Prescriptions Disp Refills    magnesium oxide (MAG-OX) 400 MG tablet 180 tablet 1     Sig: Take 1 tablet by mouth 2 times daily    omeprazole (PRILOSEC) 20 MG delayed release capsule 180 capsule 1     Sig: Take 1 capsule by mouth 2 times daily

## 2023-01-20 NOTE — TELEPHONE ENCOUNTER
Medication:   Requested Prescriptions     Pending Prescriptions Disp Refills    Insulin Pen Needle (TRUEPLUS PEN NEEDLES) 32G X 4 MM MISC 300 each 3     Sig: USE THREE TIMES A DAY       Last Filled:      Patient Phone Number: [phone] (home) [phone] (work)    Last appt: 1/9/2023   Next appt: 4/11/2023    Last Labs DM:   Lab Results   Component Value Date/Time    LABA1C 6.9 01/09/2023 03:37 PM

## 2023-01-22 RX ORDER — OMEPRAZOLE 20 MG/1
20 CAPSULE, DELAYED RELEASE ORAL 2 TIMES DAILY
Qty: 180 CAPSULE | Refills: 1 | Status: SHIPPED | OUTPATIENT
Start: 2023-01-22

## 2023-01-22 RX ORDER — MAGNESIUM OXIDE 400 MG/1
400 TABLET ORAL 2 TIMES DAILY
Qty: 180 TABLET | Refills: 1 | Status: SHIPPED | OUTPATIENT
Start: 2023-01-22

## 2023-03-28 DIAGNOSIS — E78.49 OTHER HYPERLIPIDEMIA: ICD-10-CM

## 2023-03-28 RX ORDER — FENOFIBRATE 145 MG/1
TABLET, COATED ORAL
Qty: 90 TABLET | Refills: 0 | OUTPATIENT
Start: 2023-03-28

## 2023-03-28 RX ORDER — ATORVASTATIN CALCIUM 80 MG/1
TABLET, FILM COATED ORAL
Qty: 90 TABLET | Refills: 1 | Status: SHIPPED | OUTPATIENT
Start: 2023-03-28

## 2023-03-28 NOTE — TELEPHONE ENCOUNTER
Refill Request       Last Seen: Last Seen Department: 10/27/2022  Last Seen by PCP: Visit date not found    Last Written: 12/19/2022 #90 WITH 0     Next Appointment:   Future Appointments   Date Time Provider Jean-Paul Luong   4/6/2023  3:30 PM Juana Pimentel MD [facility] AND RES [facility]   4/11/2023  2:40 PM Daniela Smith MD Kenwo Endo [facility]   8/18/2023  1:30 PM [facility] EG WC MAMMO [facility] EG WC [place] [facility]             Requested Prescriptions     Pending Prescriptions Disp Refills    atorvastatin (LIPITOR) 80 MG tablet [Pharmacy Med Name: ATORVASTATIN CALCIUM 80MG TABS] 90 tablet 0     Sig: TAKE ONE TABLET BY MOUTH ONE TIME A DAY

## 2023-04-06 ENCOUNTER — OFFICE VISIT (OUTPATIENT)
Dept: PRIMARY CARE CLINIC | Age: 48
End: 2023-04-06
Payer: COMMERCIAL

## 2023-04-06 VITALS
HEART RATE: 98 BPM | SYSTOLIC BLOOD PRESSURE: 122 MMHG | BODY MASS INDEX: 53.92 KG/M2 | OXYGEN SATURATION: 96 % | WEIGHT: 293 LBS | RESPIRATION RATE: 18 BRPM | DIASTOLIC BLOOD PRESSURE: 72 MMHG | HEIGHT: 62 IN | TEMPERATURE: 97.2 F

## 2023-04-06 DIAGNOSIS — E11.69 DM TYPE 2 WITH DIABETIC DYSLIPIDEMIA (HCC): ICD-10-CM

## 2023-04-06 DIAGNOSIS — C50.812 MALIGNANT NEOPLASM OF OVERLAPPING SITES OF LEFT BREAST IN FEMALE, ESTROGEN RECEPTOR NEGATIVE (HCC): Primary | ICD-10-CM

## 2023-04-06 DIAGNOSIS — E78.49 OTHER HYPERLIPIDEMIA: ICD-10-CM

## 2023-04-06 DIAGNOSIS — E78.5 DM TYPE 2 WITH DIABETIC DYSLIPIDEMIA (HCC): ICD-10-CM

## 2023-04-06 DIAGNOSIS — I10 PRIMARY HYPERTENSION: ICD-10-CM

## 2023-04-06 DIAGNOSIS — Z99.89 OSA ON CPAP: ICD-10-CM

## 2023-04-06 DIAGNOSIS — G47.33 OSA ON CPAP: ICD-10-CM

## 2023-04-06 DIAGNOSIS — E83.42 HYPOMAGNESEMIA: ICD-10-CM

## 2023-04-06 DIAGNOSIS — K21.9 GASTROESOPHAGEAL REFLUX DISEASE WITHOUT ESOPHAGITIS: ICD-10-CM

## 2023-04-06 DIAGNOSIS — E11.9 INSULIN DEPENDENT TYPE 2 DIABETES MELLITUS, CONTROLLED (HCC): ICD-10-CM

## 2023-04-06 DIAGNOSIS — Z79.4 INSULIN DEPENDENT TYPE 2 DIABETES MELLITUS, CONTROLLED (HCC): ICD-10-CM

## 2023-04-06 DIAGNOSIS — Z17.1 MALIGNANT NEOPLASM OF OVERLAPPING SITES OF LEFT BREAST IN FEMALE, ESTROGEN RECEPTOR NEGATIVE (HCC): Primary | ICD-10-CM

## 2023-04-06 PROCEDURE — 99214 OFFICE O/P EST MOD 30 MIN: CPT | Performed by: FAMILY MEDICINE

## 2023-04-06 PROCEDURE — 3044F HG A1C LEVEL LT 7.0%: CPT | Performed by: FAMILY MEDICINE

## 2023-04-06 PROCEDURE — 3078F DIAST BP <80 MM HG: CPT | Performed by: FAMILY MEDICINE

## 2023-04-06 PROCEDURE — 3074F SYST BP LT 130 MM HG: CPT | Performed by: FAMILY MEDICINE

## 2023-04-06 RX ORDER — DULOXETIN HYDROCHLORIDE 30 MG/1
CAPSULE, DELAYED RELEASE ORAL
COMMUNITY
Start: 2023-02-01

## 2023-04-06 RX ORDER — TRAZODONE HYDROCHLORIDE 50 MG/1
50 TABLET ORAL NIGHTLY
Qty: 30 TABLET | Refills: 0 | Status: SHIPPED | OUTPATIENT
Start: 2023-04-06

## 2023-04-06 SDOH — ECONOMIC STABILITY: FOOD INSECURITY: WITHIN THE PAST 12 MONTHS, THE FOOD YOU BOUGHT JUST DIDN'T LAST AND YOU DIDN'T HAVE MONEY TO GET MORE.: NEVER TRUE

## 2023-04-06 SDOH — ECONOMIC STABILITY: HOUSING INSECURITY
IN THE LAST 12 MONTHS, WAS THERE A TIME WHEN YOU DID NOT HAVE A STEADY PLACE TO SLEEP OR SLEPT IN A SHELTER (INCLUDING NOW)?: NO

## 2023-04-06 SDOH — ECONOMIC STABILITY: FOOD INSECURITY: WITHIN THE PAST 12 MONTHS, YOU WORRIED THAT YOUR FOOD WOULD RUN OUT BEFORE YOU GOT MONEY TO BUY MORE.: NEVER TRUE

## 2023-04-06 SDOH — ECONOMIC STABILITY: INCOME INSECURITY: HOW HARD IS IT FOR YOU TO PAY FOR THE VERY BASICS LIKE FOOD, HOUSING, MEDICAL CARE, AND HEATING?: NOT VERY HARD

## 2023-04-06 ASSESSMENT — PATIENT HEALTH QUESTIONNAIRE - PHQ9
6. FEELING BAD ABOUT YOURSELF - OR THAT YOU ARE A FAILURE OR HAVE LET YOURSELF OR YOUR FAMILY DOWN: 0
3. TROUBLE FALLING OR STAYING ASLEEP: 1
10. IF YOU CHECKED OFF ANY PROBLEMS, HOW DIFFICULT HAVE THESE PROBLEMS MADE IT FOR YOU TO DO YOUR WORK, TAKE CARE OF THINGS AT HOME, OR GET ALONG WITH OTHER PEOPLE: 1
SUM OF ALL RESPONSES TO PHQ QUESTIONS 1-9: 4
8. MOVING OR SPEAKING SO SLOWLY THAT OTHER PEOPLE COULD HAVE NOTICED. OR THE OPPOSITE, BEING SO FIGETY OR RESTLESS THAT YOU HAVE BEEN MOVING AROUND A LOT MORE THAN USUAL: 0
SUM OF ALL RESPONSES TO PHQ QUESTIONS 1-9: 4
4. FEELING TIRED OR HAVING LITTLE ENERGY: 1
2. FEELING DOWN, DEPRESSED OR HOPELESS: 1
SUM OF ALL RESPONSES TO PHQ QUESTIONS 1-9: 4
DEPRESSION UNABLE TO ASSESS: FUNCTIONAL CAPACITY MOTIVATION LIMITS ACCURACY
7. TROUBLE CONCENTRATING ON THINGS, SUCH AS READING THE NEWSPAPER OR WATCHING TELEVISION: 0
SUM OF ALL RESPONSES TO PHQ QUESTIONS 1-9: 4
1. LITTLE INTEREST OR PLEASURE IN DOING THINGS: 1
5. POOR APPETITE OR OVEREATING: 0
SUM OF ALL RESPONSES TO PHQ9 QUESTIONS 1 & 2: 2
9. THOUGHTS THAT YOU WOULD BE BETTER OFF DEAD, OR OF HURTING YOURSELF: 0

## 2023-04-06 ASSESSMENT — ANXIETY QUESTIONNAIRES
5. BEING SO RESTLESS THAT IT IS HARD TO SIT STILL: 0
6. BECOMING EASILY ANNOYED OR IRRITABLE: 0
1. FEELING NERVOUS, ANXIOUS, OR ON EDGE: 0
4. TROUBLE RELAXING: 0
GAD7 TOTAL SCORE: 0
7. FEELING AFRAID AS IF SOMETHING AWFUL MIGHT HAPPEN: 0
3. WORRYING TOO MUCH ABOUT DIFFERENT THINGS: 0
2. NOT BEING ABLE TO STOP OR CONTROL WORRYING: 0

## 2023-04-06 NOTE — PROGRESS NOTES
distress       HENT:  Normal external nose without lesions  Lots of dental decay  Normal nasal mucosa without swelling or erythema  Neck:  Symmetric and without masses  No thyromegaly  Resp:  Normal effort  Clear to auscultation bilaterally without rhonchi, wheezing or crackles  Cardiovascular: On auscultation, normal S1 and S2 without murmurs, rubs or gallops  No bruits of bilateral carotids and no JVD  Gastrointestinal:  Nontender, nondistended, and no masses  No hepatosplenomegaly  Musculoskeletal:  Normal Gait  All extremities without clubbing, cyanosis or edema  Skin:  No rashes on inspection  No areas of increased heat or induration on palpation  Psych:  Normal mood and affect  Normal insight and judgement            EMR Dragon/transcription disclaimer:  Much of this encounter note is electronic transcription/translation of spoken language to printed texts. The electronic translation of spoken language may be erroneous, or at times, nonsensical words or phrases may be inadvertently transcribed.   Although I have reviewed the note for such errors, some may still exist.

## 2023-04-11 DIAGNOSIS — I10 ESSENTIAL HYPERTENSION: ICD-10-CM

## 2023-04-11 DIAGNOSIS — E78.5 DM TYPE 2 WITH DIABETIC DYSLIPIDEMIA (HCC): ICD-10-CM

## 2023-04-11 DIAGNOSIS — E78.49 OTHER HYPERLIPIDEMIA: ICD-10-CM

## 2023-04-11 DIAGNOSIS — E11.69 DM TYPE 2 WITH DIABETIC DYSLIPIDEMIA (HCC): ICD-10-CM

## 2023-04-11 DIAGNOSIS — I10 PRIMARY HYPERTENSION: ICD-10-CM

## 2023-04-11 RX ORDER — HYDROCHLOROTHIAZIDE 25 MG/1
25 TABLET ORAL DAILY
Qty: 90 TABLET | Refills: 1 | Status: SHIPPED | OUTPATIENT
Start: 2023-04-11

## 2023-04-12 LAB
ALBUMIN SERPL-MCNC: 4.6 G/DL (ref 3.4–5)
ALBUMIN/GLOB SERPL: 1.6 {RATIO} (ref 1.1–2.2)
ALP SERPL-CCNC: 70 U/L (ref 40–129)
ALT SERPL-CCNC: 63 U/L (ref 10–40)
ANION GAP SERPL CALCULATED.3IONS-SCNC: 14 MMOL/L (ref 3–16)
AST SERPL-CCNC: 79 U/L (ref 15–37)
BILIRUB SERPL-MCNC: <0.2 MG/DL (ref 0–1)
BUN SERPL-MCNC: 16 MG/DL (ref 7–20)
CALCIUM SERPL-MCNC: 10 MG/DL (ref 8.3–10.6)
CHLORIDE SERPL-SCNC: 97 MMOL/L (ref 99–110)
CHOLEST SERPL-MCNC: 299 MG/DL (ref 0–199)
CO2 SERPL-SCNC: 26 MMOL/L (ref 21–32)
CREAT SERPL-MCNC: 0.9 MG/DL (ref 0.6–1.1)
EST. AVERAGE GLUCOSE BLD GHB EST-MCNC: 171.4 MG/DL
GFR SERPLBLD CREATININE-BSD FMLA CKD-EPI: >60 ML/MIN/{1.73_M2}
GLUCOSE SERPL-MCNC: 151 MG/DL (ref 70–99)
HBA1C MFR BLD: 7.6 %
HDLC SERPL-MCNC: 31 MG/DL (ref 40–60)
LDLC SERPL CALC-MCNC: ABNORMAL MG/DL
LDLC SERPL-MCNC: 146 MG/DL
POTASSIUM SERPL-SCNC: 4.4 MMOL/L (ref 3.5–5.1)
PROT SERPL-MCNC: 7.5 G/DL (ref 6.4–8.2)
SODIUM SERPL-SCNC: 137 MMOL/L (ref 136–145)
TRIGL SERPL-MCNC: 808 MG/DL (ref 0–150)
VLDLC SERPL CALC-MCNC: ABNORMAL MG/DL

## 2023-04-19 DIAGNOSIS — Z79.4 INSULIN DEPENDENT TYPE 2 DIABETES MELLITUS, CONTROLLED (HCC): ICD-10-CM

## 2023-04-19 DIAGNOSIS — E11.9 INSULIN DEPENDENT TYPE 2 DIABETES MELLITUS, CONTROLLED (HCC): ICD-10-CM

## 2023-04-19 RX ORDER — INSULIN HUMAN 500 [IU]/ML
INJECTION, SOLUTION SUBCUTANEOUS
Qty: 90 ML | Refills: 2 | Status: SHIPPED | OUTPATIENT
Start: 2023-04-19

## 2023-04-19 NOTE — TELEPHONE ENCOUNTER
Medication:   Requested Prescriptions     Pending Prescriptions Disp Refills    insulin regular human (HUMULIN R U-500 KWIKPEN) 500 UNIT/ML SOPN concentrated injection pen [Pharmacy Med Name: HUMULIN R U-500 KWIKPEN 500 SOPN] 90 mL 2     Sig: INJECT 100-120 UNITS UNDER THE SKIN BEFORE MEALS AS DIRECTED.   UNITS DAILY       Last Filled:      Patient Phone Number: [phone] (home) [phone] (work)    Last appt: 4/11/2023   Next appt: 7/25/2023    Last Labs DM:   Lab Results   Component Value Date/Time    LABA1C 6.9 04/11/2023 04:22 PM

## 2023-04-20 RX ORDER — ROSUVASTATIN CALCIUM 40 MG/1
40 TABLET, COATED ORAL DAILY
Qty: 90 TABLET | Refills: 1 | Status: SHIPPED | OUTPATIENT
Start: 2023-04-20

## 2023-04-28 ENCOUNTER — PATIENT MESSAGE (OUTPATIENT)
Dept: PRIMARY CARE CLINIC | Age: 48
End: 2023-04-28

## 2023-05-01 RX ORDER — TRAZODONE HYDROCHLORIDE 100 MG/1
100 TABLET ORAL NIGHTLY
Qty: 90 TABLET | Refills: 0 | Status: SHIPPED | OUTPATIENT
Start: 2023-05-01

## 2023-05-05 RX ORDER — TRAZODONE HYDROCHLORIDE 50 MG/1
TABLET ORAL
Qty: 30 TABLET | Refills: 0 | OUTPATIENT
Start: 2023-05-05

## 2023-05-18 RX ORDER — TIRZEPATIDE 2.5 MG/.5ML
2.5 INJECTION, SOLUTION SUBCUTANEOUS WEEKLY
Qty: 2 ML | Refills: 0 | Status: SHIPPED | OUTPATIENT
Start: 2023-05-18

## 2023-05-18 RX ORDER — MAGNESIUM OXIDE 400 MG/1
400 TABLET ORAL 2 TIMES DAILY
Qty: 180 TABLET | Refills: 1 | Status: SHIPPED | OUTPATIENT
Start: 2023-05-18

## 2023-05-18 RX ORDER — LANOLIN ALCOHOL/MO/W.PET/CERES
CREAM (GRAM) TOPICAL
Qty: 180 TABLET | Refills: 1 | OUTPATIENT
Start: 2023-05-18

## 2023-05-18 NOTE — TELEPHONE ENCOUNTER
Medication:   Requested Prescriptions     Pending Prescriptions Disp Refills    Tirzepatide (MOUNJARO) 2.5 MG/0.5ML SOPN SC injection 2 mL 0     Sig: Inject 0.5 mLs into the skin once a week       Last Filled:      Patient Phone Number: [phone] (home) [phone] (work)    Last appt: 4/11/2023   Next appt: 7/25/2023    Last Labs DM:   Lab Results   Component Value Date/Time    LABA1C 6.9 04/11/2023 04:22 PM

## 2023-05-18 NOTE — TELEPHONE ENCOUNTER
Refill Request       Last Seen: Last Seen Department: 4/6/2023  Last Seen by PCP: 4/6/2023    Last Written: 01/22/2023 #180 with 1    Next Appointment:   Future Appointments   Date Time Provider Jean-Paul Luong   7/25/2023  1:40 PM MD Randa Watt   8/18/2023  1:30 PM [address] EG WC MAMMO [facility] EG WC [place] [facility]   10/9/2023  3:00 PM Juana Flores MD [facility] AND RES [facility]             Requested Prescriptions     Pending Prescriptions Disp Refills    magnesium oxide (MAG-OX) 400 MG tablet 180 tablet 1     Sig: Take 1 tablet by mouth 2 times daily

## 2023-05-19 RX ORDER — TIRZEPATIDE 5 MG/.5ML
INJECTION, SOLUTION SUBCUTANEOUS
Qty: 6 ML | Refills: 3 | Status: SHIPPED | OUTPATIENT
Start: 2023-05-19

## 2023-05-30 RX ORDER — BLOOD SUGAR DIAGNOSTIC
STRIP MISCELLANEOUS
Qty: 400 STRIP | Refills: 3 | Status: SHIPPED | OUTPATIENT
Start: 2023-05-30

## 2023-06-21 ENCOUNTER — TELEPHONE (OUTPATIENT)
Dept: PRIMARY CARE CLINIC | Age: 48
End: 2023-06-21

## 2023-06-21 NOTE — TELEPHONE ENCOUNTER
Pt wanted an evist yesterday for eye symptoms      Please call and offer visit with resident tomorrow for evaluation.   I would like an inperson eval.

## 2023-06-22 ENCOUNTER — OFFICE VISIT (OUTPATIENT)
Dept: PRIMARY CARE CLINIC | Age: 48
End: 2023-06-22
Payer: COMMERCIAL

## 2023-06-22 VITALS
HEART RATE: 87 BPM | OXYGEN SATURATION: 97 % | DIASTOLIC BLOOD PRESSURE: 78 MMHG | SYSTOLIC BLOOD PRESSURE: 112 MMHG | BODY MASS INDEX: 54.5 KG/M2 | TEMPERATURE: 97.3 F | RESPIRATION RATE: 18 BRPM | WEIGHT: 293 LBS

## 2023-06-22 DIAGNOSIS — R23.4 CRUSTING OF SKIN OF EYELID: Primary | ICD-10-CM

## 2023-06-22 PROCEDURE — 99213 OFFICE O/P EST LOW 20 MIN: CPT

## 2023-06-22 PROCEDURE — 3074F SYST BP LT 130 MM HG: CPT

## 2023-06-22 PROCEDURE — 3078F DIAST BP <80 MM HG: CPT

## 2023-06-22 RX ORDER — HYDROXYZINE HYDROCHLORIDE 25 MG/1
25 TABLET, FILM COATED ORAL EVERY 8 HOURS PRN
Qty: 30 TABLET | Refills: 0 | Status: SHIPPED | OUTPATIENT
Start: 2023-06-22 | End: 2023-07-02

## 2023-06-22 ASSESSMENT — PATIENT HEALTH QUESTIONNAIRE - PHQ9
SUM OF ALL RESPONSES TO PHQ QUESTIONS 1-9: 3
7. TROUBLE CONCENTRATING ON THINGS, SUCH AS READING THE NEWSPAPER OR WATCHING TELEVISION: 0
9. THOUGHTS THAT YOU WOULD BE BETTER OFF DEAD, OR OF HURTING YOURSELF: 0
2. FEELING DOWN, DEPRESSED OR HOPELESS: 0
SUM OF ALL RESPONSES TO PHQ9 QUESTIONS 1 & 2: 0
6. FEELING BAD ABOUT YOURSELF - OR THAT YOU ARE A FAILURE OR HAVE LET YOURSELF OR YOUR FAMILY DOWN: 0
SUM OF ALL RESPONSES TO PHQ QUESTIONS 1-9: 3
5. POOR APPETITE OR OVEREATING: 0
4. FEELING TIRED OR HAVING LITTLE ENERGY: 1
8. MOVING OR SPEAKING SO SLOWLY THAT OTHER PEOPLE COULD HAVE NOTICED. OR THE OPPOSITE, BEING SO FIGETY OR RESTLESS THAT YOU HAVE BEEN MOVING AROUND A LOT MORE THAN USUAL: 0
3. TROUBLE FALLING OR STAYING ASLEEP: 2
1. LITTLE INTEREST OR PLEASURE IN DOING THINGS: 0
SUM OF ALL RESPONSES TO PHQ QUESTIONS 1-9: 3
SUM OF ALL RESPONSES TO PHQ QUESTIONS 1-9: 3
10. IF YOU CHECKED OFF ANY PROBLEMS, HOW DIFFICULT HAVE THESE PROBLEMS MADE IT FOR YOU TO DO YOUR WORK, TAKE CARE OF THINGS AT HOME, OR GET ALONG WITH OTHER PEOPLE: 1

## 2023-06-22 ASSESSMENT — ANXIETY QUESTIONNAIRES
3. WORRYING TOO MUCH ABOUT DIFFERENT THINGS: 0
7. FEELING AFRAID AS IF SOMETHING AWFUL MIGHT HAPPEN: 0
6. BECOMING EASILY ANNOYED OR IRRITABLE: 0
1. FEELING NERVOUS, ANXIOUS, OR ON EDGE: 0
GAD7 TOTAL SCORE: 0
5. BEING SO RESTLESS THAT IT IS HARD TO SIT STILL: 0
2. NOT BEING ABLE TO STOP OR CONTROL WORRYING: 0
IF YOU CHECKED OFF ANY PROBLEMS ON THIS QUESTIONNAIRE, HOW DIFFICULT HAVE THESE PROBLEMS MADE IT FOR YOU TO DO YOUR WORK, TAKE CARE OF THINGS AT HOME, OR GET ALONG WITH OTHER PEOPLE: NOT DIFFICULT AT ALL
4. TROUBLE RELAXING: 0

## 2023-06-22 NOTE — PROGRESS NOTES
09/30/2019, 11/09/2020    Influenza, FLUCELVAX, (age 10 mo+), MDCK, PF, 0.5mL 11/15/2021    Pneumococcal, PPSV23, PNEUMOVAX 23, (age 2y+), SC/IM, 0.5mL 02/26/2016    TDaP, ADACEL (age 10y-63y), [address] (age 10y+), IM, 0.5mL 06/27/2012, 10/27/2022         Review of Systems:  Pertinent positives in HPI. Constitutional: Negative for activity change, appetite change, chills, fatigue and fever. HENT: Negative for congestion, drooling, ear pain, hearing loss, sinus pressure, sore throat and trouble swallowing. Eyes: Positive for crusty eye and itchiness. Negative for pain, discharge, and visual disturbance. Respiratory: Negative for apnea, choking, chest tightness, shortness of breath and wheezing. Cardiovascular: Negative for chest pain, palpitations and leg swelling. Gastrointestinal: Negative for abdominal distention, abdominal pain, blood in stool, constipation, nausea and vomiting. Endocrine: Negative for cold intolerance, heat intolerance and polydipsia. Genitourinary: Negative for dysuria, flank pain, frequency, genital sores, hematuria. Musculoskeletal: Negative for back pain, gait problem, joint swelling, neck pain and neck stiffness. Skin: Negative for color change, rash and wound. Neurological: Negative for dizziness, syncope, light-headedness, numbness and headaches. Psychiatric/Behavioral: Negative for agitation, confusion, decreased concentration, dysphoric mood, hallucinations, sleep disturbance and suicidal ideas. The patient is not nervous/anxious and is not hyperactive. Physical Exam:   Vitals:    06/22/23 1536   BP: 112/78   Site: Right Upper Arm   Position: Sitting   Cuff Size: Medium Adult   Pulse: 87   Resp: 18   Temp: 97.3 °F (36.3 °C)   TempSrc: Temporal   SpO2: 97%   Weight: 298 lb (135.2 kg)     Body mass index is 54.5 kg/m².     Wt Readings from Last 3 Encounters:   06/22/23 298 lb (135.2 kg)   04/11/23 (!) 306 lb 12.8 oz (139.2 kg)   04/06/23 (!) 305 lb (138.3
Detail Level: Generalized
Include Location In Plan?: No

## 2023-07-07 DIAGNOSIS — I10 ESSENTIAL HYPERTENSION: ICD-10-CM

## 2023-07-07 DIAGNOSIS — E78.49 OTHER HYPERLIPIDEMIA: ICD-10-CM

## 2023-07-07 NOTE — TELEPHONE ENCOUNTER
Medication:   Requested Prescriptions     Pending Prescriptions Disp Refills    empagliflozin (JARDIANCE) 25 MG tablet 90 tablet 1     Sig: TAKE 1 TABLET BY MOUTH ONE TIME A DAY       Last Filled:      Patient Phone Number: [phone] (home) [phone] (work)    Last appt: 4/11/2023   Next appt: 7/25/2023    Last Labs DM:   Lab Results   Component Value Date/Time    LABA1C 6.9 04/11/2023 04:22 PM

## 2023-07-10 RX ORDER — HYDROCHLOROTHIAZIDE 25 MG/1
TABLET ORAL
Qty: 90 TABLET | Refills: 0 | Status: SHIPPED | OUTPATIENT
Start: 2023-07-10

## 2023-07-10 RX ORDER — LISINOPRIL 40 MG/1
40 TABLET ORAL DAILY
Qty: 90 TABLET | Refills: 0 | Status: SHIPPED | OUTPATIENT
Start: 2023-07-10

## 2023-07-10 RX ORDER — FENOFIBRATE 145 MG/1
145 TABLET, COATED ORAL DAILY
Qty: 90 TABLET | Refills: 1 | Status: SHIPPED | OUTPATIENT
Start: 2023-07-10

## 2023-07-21 DIAGNOSIS — K21.9 GASTROESOPHAGEAL REFLUX DISEASE WITHOUT ESOPHAGITIS: ICD-10-CM

## 2023-07-21 RX ORDER — OMEPRAZOLE 20 MG/1
CAPSULE, DELAYED RELEASE ORAL
Qty: 180 CAPSULE | Refills: 1 | Status: SHIPPED | OUTPATIENT
Start: 2023-07-21

## 2023-07-21 NOTE — TELEPHONE ENCOUNTER
Refill Request       Last Seen: Last Seen Department: 6/22/2023  Last Seen by PCP: 4/6/2023    Last Written: 1/22/2023 180 with 1    Next Appointment:   Future Appointments   Date Time Provider [address]   7/25/2023  1:40 PM MD Randa Saul   8/18/2023  1:30  [place] EG WC MAMMO [facility] EG WC [place] [facility]   10/9/2023  3:00 PM Juana Jeter MD [facility] AND RES [facility]         Requested Prescriptions     Pending Prescriptions Disp Refills    omeprazole (PRILOSEC) 20 MG delayed release capsule [Pharmacy Med Name: OMEPRAZOLE 20MG CPDR] 180 capsule 1     Sig: TAKE ONE CAPSULE BY MOUTH TWO TIMES A DAY

## 2023-07-25 ENCOUNTER — OFFICE VISIT (OUTPATIENT)
Dept: ENDOCRINOLOGY | Age: 48
End: 2023-07-25
Payer: COMMERCIAL

## 2023-07-25 VITALS
DIASTOLIC BLOOD PRESSURE: 69 MMHG | RESPIRATION RATE: 14 BRPM | TEMPERATURE: 97 F | BODY MASS INDEX: 53.92 KG/M2 | OXYGEN SATURATION: 97 % | WEIGHT: 293 LBS | HEIGHT: 62 IN | HEART RATE: 104 BPM | SYSTOLIC BLOOD PRESSURE: 130 MMHG

## 2023-07-25 DIAGNOSIS — E11.9 INSULIN DEPENDENT TYPE 2 DIABETES MELLITUS, CONTROLLED (HCC): Primary | ICD-10-CM

## 2023-07-25 DIAGNOSIS — Z79.4 INSULIN DEPENDENT TYPE 2 DIABETES MELLITUS, CONTROLLED (HCC): Primary | ICD-10-CM

## 2023-07-25 LAB — HBA1C MFR BLD: 6.4 %

## 2023-07-25 PROCEDURE — 3075F SYST BP GE 130 - 139MM HG: CPT | Performed by: INTERNAL MEDICINE

## 2023-07-25 PROCEDURE — 83037 HB GLYCOSYLATED A1C HOME DEV: CPT | Performed by: INTERNAL MEDICINE

## 2023-07-25 PROCEDURE — 99214 OFFICE O/P EST MOD 30 MIN: CPT | Performed by: INTERNAL MEDICINE

## 2023-07-25 PROCEDURE — 3044F HG A1C LEVEL LT 7.0%: CPT | Performed by: INTERNAL MEDICINE

## 2023-07-25 PROCEDURE — 3078F DIAST BP <80 MM HG: CPT | Performed by: INTERNAL MEDICINE

## 2023-07-25 RX ORDER — TIRZEPATIDE 7.5 MG/.5ML
7.5 INJECTION, SOLUTION SUBCUTANEOUS WEEKLY
Qty: 13 ADJUSTABLE DOSE PRE-FILLED PEN SYRINGE | Refills: 3 | Status: SHIPPED | OUTPATIENT
Start: 2023-07-25

## 2023-07-25 RX ORDER — FLURBIPROFEN SODIUM 0.3 MG/ML
1 SOLUTION/ DROPS OPHTHALMIC 3 TIMES DAILY
Qty: 300 EACH | Refills: 6 | Status: SHIPPED | OUTPATIENT
Start: 2023-07-25

## 2023-07-26 ENCOUNTER — TELEPHONE (OUTPATIENT)
Dept: ENDOCRINOLOGY | Age: 48
End: 2023-07-26

## 2023-07-26 NOTE — TELEPHONE ENCOUNTER
Submitted PA for Marge Flaming  Via CM Key: [id number] STATUS: PENDING. Follow up done daily; if no response in three days we will refax for status check. If another three days goes by with no response we will call the insurance for status.

## 2023-07-28 NOTE — TELEPHONE ENCOUNTER
Tor Plan LAY Key: [id number] - PA Case ID: [id number] - Rx #: [id number]  Outcome  Approved today  The request has been approved. The authorization is effective for a maximum of 12 fills from 07/28/2023 to 07/27/2024, as long as the member is enrolled in their current health plan. The request was approved as submitted. This request has been approved with a quantity limit of 2mL per 28 days.

## 2023-08-18 ENCOUNTER — HOSPITAL ENCOUNTER (OUTPATIENT)
Dept: WOMENS IMAGING | Age: 48
Discharge: HOME | End: 2023-08-18
Payer: COMMERCIAL

## 2023-08-18 DIAGNOSIS — R92.8 ABNORMAL MAMMOGRAM: ICD-10-CM

## 2023-08-18 DIAGNOSIS — Z85.3 PERSONAL HISTORY OF MALIGNANT NEOPLASM OF BREAST: ICD-10-CM

## 2023-08-18 PROCEDURE — 76642 ULTRASOUND BREAST LIMITED: CPT

## 2023-08-18 PROCEDURE — G0279 TOMOSYNTHESIS, MAMMO: HCPCS

## 2023-08-18 NOTE — PROGRESS NOTES
[facility]   [address]   Phone: [phone]         ULTRASOUND BIOPSY EDUCATION    NAME:  Conchita Pyle   YOB: 1975   MEDICAL RECORD NUMBER:  [id number]   TODAY'S DATE:  8/18/2023    Referring Physician: Dr. Yessy Alas    Procedure: U/S Core Bx    Right breast    Date of biopsy: 8/25/23    Patient taking blood thinners: yes - holding asa 8/23-25    Medicine allergies: no    Special Instructions: n/a    Biopsy order form faxed to referring MD.          What is an Ultrasound Guided Breast Biopsy? Ultrasound guided breast biopsy is a test that uses ultrasound to find an area of your breast where a tissue sample will be taken. The sample is then looked at under a microscope to check for signs of breast cancer. Why is it done? An Ultrasound biopsy is usually done to check for cancer in a lump or cyst found during a mammogram or ultrasound. Preparing for the test?     * Take your medications as prescribed    You may eat and drink fluids before the test    Take a shower the evening or morning before the biopsy. What happens before the test?  Images are taken to find the exact site to be biopsied. Your skin is washed with an alcohol prep. You will be given an injection of medication to numb your breast.   What happens during the test?     Once your breast is numb, a small cut (incision) is made. Using the imaging, the doctor will guide the needle into the biopsy area. A sample of breast tissue is taken through the needle. A small \"Clip\" or Marker is inserted into your breast to eugenia the biopsy site. The needle is removed and pressure put on the needle site to stop any bleeding. A bandage will be placed over the site. A post mammogram picture will be taken to document the clip placement. How long does the test take? Approximately 60 minutes.   Most of the time is spent

## 2023-08-25 ENCOUNTER — HOSPITAL ENCOUNTER (OUTPATIENT)
Dept: WOMENS IMAGING | Age: 48
Discharge: HOME | End: 2023-08-25
Payer: COMMERCIAL

## 2023-08-25 DIAGNOSIS — R92.8 ABNORMAL MAMMOGRAM: ICD-10-CM

## 2023-08-25 PROCEDURE — 19083 BX BREAST 1ST LESION US IMAG: CPT

## 2023-08-25 PROCEDURE — G0279 TOMOSYNTHESIS, MAMMO: HCPCS

## 2023-08-25 NOTE — PROGRESS NOTES
Patient in [facility] [facility] for breast biopsy. Radiologist reviewed procedure with patient, consent signed. Patient tolerated procedure well. Compression held. Site cleansed with chloraprep, steri strips and dry dressing applied. Ice pack provided. Reviewed discharge instructions with patient. Patient verbalized understanding and agreed to contact the Breast Navigator with any questions. Patient was A&Ox3 and steady on feet and discharged to waiting area. The [address]   Discharge Instructions  [facility]  [address]  Telephone: [phone]    NAME:  Mariama Anaya OF BIRTH:  1975  GENDER: female  MEDICAL RECORD NUMBER:  [id number]  TODAY'S DATE:  8/25/2023    Discharge Instructions Breast Center:    Post Breast Biopsy Instructions     [x] You may remove your outer dressing in 24 hours and you may shower. The surgical glue will fall off after 7 days. Do not pick, scratch, or rub the film. [x] Place a cold pack inside your bra on top of the dressing for at least 3 hours, removing it every 15 minutes for 15 minutes after your biopsy. [x] Wear a firm fitting bra for at least 24 hours after your biopsy, including while you sleep. [x] Place an ice pack inside your bra on top of the dressing for at least 3 hours, removing it every 15 minutes for 15 minutes after your biopsy. [x] You may resume your held medications tomorrow, unless otherwise directed by your physician. [x] Your physician has instructed you to take Tylenol (Acetaminophen) the day of your biopsy for any discomfort. [x] Watch for excessive bleeding. If bleeding occurs apply pressure to site. Watch for signs of infection, increased pain, redness, swelling and heat. If this occurs call your physician. [x] Do not participate in any strenuous exercise for 48 hours after your biopsy and do not lift, pull or push anything over 5-10 lbs.       [x] Results

## 2023-09-19 LAB
CHOLEST SERPL-MCNC: 138 MG/DL (ref 0–199)
GLUCOSE SERPL-MCNC: 157 MG/DL (ref 70–99)
HDLC SERPL-MCNC: 28 MG/DL (ref 40–60)
LDLC SERPL CALC-MCNC: ABNORMAL MG/DL
LDLC SERPL-MCNC: 45 MG/DL
TRIGL SERPL-MCNC: 418 MG/DL (ref 0–150)

## 2023-09-23 LAB
EST. AVERAGE GLUCOSE BLD GHB EST-MCNC: 134.1 MG/DL
HBA1C MFR BLD: 6.3 %

## 2023-09-28 ENCOUNTER — OFFICE VISIT (OUTPATIENT)
Dept: PULMONOLOGY | Age: 48
End: 2023-09-28
Payer: COMMERCIAL

## 2023-09-28 VITALS
WEIGHT: 289.4 LBS | BODY MASS INDEX: 53.26 KG/M2 | HEART RATE: 85 BPM | HEIGHT: 62 IN | OXYGEN SATURATION: 99 % | SYSTOLIC BLOOD PRESSURE: 110 MMHG | RESPIRATION RATE: 18 BRPM | TEMPERATURE: 97.4 F | DIASTOLIC BLOOD PRESSURE: 70 MMHG

## 2023-09-28 DIAGNOSIS — G47.33 OSA ON CPAP: ICD-10-CM

## 2023-09-28 PROCEDURE — 3074F SYST BP LT 130 MM HG: CPT | Performed by: INTERNAL MEDICINE

## 2023-09-28 PROCEDURE — 3078F DIAST BP <80 MM HG: CPT | Performed by: INTERNAL MEDICINE

## 2023-09-28 PROCEDURE — 99213 OFFICE O/P EST LOW 20 MIN: CPT | Performed by: INTERNAL MEDICINE

## 2023-09-28 ASSESSMENT — SLEEP AND FATIGUE QUESTIONNAIRES
ESS TOTAL SCORE: 5
HOW LIKELY ARE YOU TO NOD OFF OR FALL ASLEEP WHILE LYING DOWN TO REST IN THE AFTERNOON WHEN CIRCUMSTANCES PERMIT: 2
HOW LIKELY ARE YOU TO NOD OFF OR FALL ASLEEP WHILE SITTING QUIETLY AFTER LUNCH WITHOUT ALCOHOL: 0
HOW LIKELY ARE YOU TO NOD OFF OR FALL ASLEEP WHILE SITTING AND READING: 2
HOW LIKELY ARE YOU TO NOD OFF OR FALL ASLEEP WHILE SITTING INACTIVE IN A PUBLIC PLACE: 0
HOW LIKELY ARE YOU TO NOD OFF OR FALL ASLEEP WHILE WATCHING TV: 1
HOW LIKELY ARE YOU TO NOD OFF OR FALL ASLEEP IN A CAR, WHILE STOPPED FOR A FEW MINUTES IN TRAFFIC: 0
HOW LIKELY ARE YOU TO NOD OFF OR FALL ASLEEP WHILE SITTING AND TALKING TO SOMEONE: 0
HOW LIKELY ARE YOU TO NOD OFF OR FALL ASLEEP WHEN YOU ARE A PASSENGER IN A CAR FOR AN HOUR WITHOUT A BREAK: 0

## 2023-09-28 NOTE — PATIENT INSTRUCTIONS
Lorena view = F & P Yvonne Full Face CPAP Mask    Over the head hose       Resmed F30i  Urban Glimpse full face mask

## 2023-09-28 NOTE — ASSESSMENT & PLAN NOTE
Titration is not required during this visit. PAP download information:  Usage > 4 hours  100 %   Pressure setting  na cwp    AHI with usage  na     See media tab for full download data. Conchita Edenilson  is deriving benefit from PAP demonstrated by improved [place], AHI, symptoms.

## 2023-10-02 ENCOUNTER — TELEPHONE (OUTPATIENT)
Dept: PULMONOLOGY | Age: 48
End: 2023-10-02

## 2023-10-02 NOTE — TELEPHONE ENCOUNTER
Will FWD to Dr Addy Cooper to place order for She wants the [facility] full face mask with the one strap that goes over the back of her head.

## 2023-10-02 NOTE — TELEPHONE ENCOUNTER
Rachel Matos says [facility] says they never got the order for the new mask from our office. She wants the F & P Lorena full face mask with the one strap that goes over the back of her head.   She said to fax it to [facility] at [phone]

## 2023-10-04 NOTE — TELEPHONE ENCOUNTER
Refill Request   Return for Return as scheduled in October    Last Seen: Last Seen Department: 6/22/2023  Last Seen by PCP: 4/6/2023    Last Written: 4/20/23 90 with 1    Next Appointment:   Future Appointments   Date Time Provider [address]   10/9/2023  3:00 PM Nani Casillas MD [facility]   11/13/2023  4:20 PM Linh Baldwin MD Kenwo Endo [facility]   3/1/2024  2:00  [facility] EG WC MAMMO [facility] EG [facility] [place] [facility]   3/1/2024  2:30 PM [facility] EG [place] EG WC [place] [facility]   10/3/2024  7:00 AM Samuel Ballard MD [facility]         Requested Prescriptions     Pending Prescriptions Disp Refills    rosuvastatin (CRESTOR) 40 MG tablet [Pharmacy Med Name: ROSUVASTATIN CALCIUM 40MG TABS] 90 tablet 1     Sig: TAKE ONE TABLET BY MOUTH ONE TIME A DAY.  THIS IS REPLACING ATORVASTATIN

## 2023-10-05 RX ORDER — ROSUVASTATIN CALCIUM 40 MG/1
TABLET, COATED ORAL
Qty: 90 TABLET | Refills: 1 | Status: SHIPPED | OUTPATIENT
Start: 2023-10-05

## 2023-10-09 ENCOUNTER — OFFICE VISIT (OUTPATIENT)
Dept: PRIMARY CARE CLINIC | Age: 48
End: 2023-10-09
Payer: COMMERCIAL

## 2023-10-09 VITALS
BODY MASS INDEX: 52.81 KG/M2 | HEIGHT: 62 IN | TEMPERATURE: 98.1 F | DIASTOLIC BLOOD PRESSURE: 64 MMHG | HEART RATE: 87 BPM | WEIGHT: 287 LBS | OXYGEN SATURATION: 97 % | SYSTOLIC BLOOD PRESSURE: 114 MMHG

## 2023-10-09 DIAGNOSIS — Z79.4 INSULIN DEPENDENT TYPE 2 DIABETES MELLITUS, CONTROLLED (HCC): ICD-10-CM

## 2023-10-09 DIAGNOSIS — Z17.1 MALIGNANT NEOPLASM OF OVERLAPPING SITES OF LEFT BREAST IN FEMALE, ESTROGEN RECEPTOR NEGATIVE (HCC): ICD-10-CM

## 2023-10-09 DIAGNOSIS — R79.89 ELEVATED LFTS: ICD-10-CM

## 2023-10-09 DIAGNOSIS — E78.49 OTHER HYPERLIPIDEMIA: ICD-10-CM

## 2023-10-09 DIAGNOSIS — C50.812 MALIGNANT NEOPLASM OF OVERLAPPING SITES OF LEFT BREAST IN FEMALE, ESTROGEN RECEPTOR NEGATIVE (HCC): ICD-10-CM

## 2023-10-09 DIAGNOSIS — F40.243 FLYING PHOBIA: ICD-10-CM

## 2023-10-09 DIAGNOSIS — G25.81 RLS (RESTLESS LEGS SYNDROME): ICD-10-CM

## 2023-10-09 DIAGNOSIS — K21.9 GASTROESOPHAGEAL REFLUX DISEASE WITHOUT ESOPHAGITIS: ICD-10-CM

## 2023-10-09 DIAGNOSIS — E11.9 INSULIN DEPENDENT TYPE 2 DIABETES MELLITUS, CONTROLLED (HCC): ICD-10-CM

## 2023-10-09 DIAGNOSIS — F51.01 PRIMARY INSOMNIA: ICD-10-CM

## 2023-10-09 DIAGNOSIS — G47.33 OSA ON CPAP: ICD-10-CM

## 2023-10-09 DIAGNOSIS — I10 PRIMARY HYPERTENSION: Primary | ICD-10-CM

## 2023-10-09 PROCEDURE — 3044F HG A1C LEVEL LT 7.0%: CPT | Performed by: STUDENT IN AN ORGANIZED HEALTH CARE EDUCATION/TRAINING PROGRAM

## 2023-10-09 PROCEDURE — 3074F SYST BP LT 130 MM HG: CPT | Performed by: STUDENT IN AN ORGANIZED HEALTH CARE EDUCATION/TRAINING PROGRAM

## 2023-10-09 PROCEDURE — 3078F DIAST BP <80 MM HG: CPT | Performed by: STUDENT IN AN ORGANIZED HEALTH CARE EDUCATION/TRAINING PROGRAM

## 2023-10-09 PROCEDURE — 99214 OFFICE O/P EST MOD 30 MIN: CPT | Performed by: STUDENT IN AN ORGANIZED HEALTH CARE EDUCATION/TRAINING PROGRAM

## 2023-10-09 RX ORDER — DIAZEPAM 2 MG/1
TABLET ORAL
Qty: 5 TABLET | Refills: 0 | Status: SHIPPED | OUTPATIENT
Start: 2023-10-09 | End: 2023-10-30

## 2023-10-09 ASSESSMENT — PATIENT HEALTH QUESTIONNAIRE - PHQ9
5. POOR APPETITE OR OVEREATING: 0
3. TROUBLE FALLING OR STAYING ASLEEP: 2
6. FEELING BAD ABOUT YOURSELF - OR THAT YOU ARE A FAILURE OR HAVE LET YOURSELF OR YOUR FAMILY DOWN: 0
7. TROUBLE CONCENTRATING ON THINGS, SUCH AS READING THE NEWSPAPER OR WATCHING TELEVISION: 0
SUM OF ALL RESPONSES TO PHQ QUESTIONS 1-9: 4
SUM OF ALL RESPONSES TO PHQ9 QUESTIONS 1 & 2: 0
10. IF YOU CHECKED OFF ANY PROBLEMS, HOW DIFFICULT HAVE THESE PROBLEMS MADE IT FOR YOU TO DO YOUR WORK, TAKE CARE OF THINGS AT HOME, OR GET ALONG WITH OTHER PEOPLE: 1
8. MOVING OR SPEAKING SO SLOWLY THAT OTHER PEOPLE COULD HAVE NOTICED. OR THE OPPOSITE, BEING SO FIGETY OR RESTLESS THAT YOU HAVE BEEN MOVING AROUND A LOT MORE THAN USUAL: 0
SUM OF ALL RESPONSES TO PHQ QUESTIONS 1-9: 4
9. THOUGHTS THAT YOU WOULD BE BETTER OFF DEAD, OR OF HURTING YOURSELF: 0
2. FEELING DOWN, DEPRESSED OR HOPELESS: 0
1. LITTLE INTEREST OR PLEASURE IN DOING THINGS: 0
4. FEELING TIRED OR HAVING LITTLE ENERGY: 2

## 2023-10-09 ASSESSMENT — ANXIETY QUESTIONNAIRES
3. WORRYING TOO MUCH ABOUT DIFFERENT THINGS: 1
GAD7 TOTAL SCORE: 3
7. FEELING AFRAID AS IF SOMETHING AWFUL MIGHT HAPPEN: 0
6. BECOMING EASILY ANNOYED OR IRRITABLE: 0
5. BEING SO RESTLESS THAT IT IS HARD TO SIT STILL: 0
IF YOU CHECKED OFF ANY PROBLEMS ON THIS QUESTIONNAIRE, HOW DIFFICULT HAVE THESE PROBLEMS MADE IT FOR YOU TO DO YOUR WORK, TAKE CARE OF THINGS AT HOME, OR GET ALONG WITH OTHER PEOPLE: SOMEWHAT DIFFICULT
4. TROUBLE RELAXING: 0
1. FEELING NERVOUS, ANXIOUS, OR ON EDGE: 1
2. NOT BEING ABLE TO STOP OR CONTROL WORRYING: 1

## 2023-10-09 NOTE — PROGRESS NOTES
difficulty with getting the supplies for CPAP but notes that it functions properly. She follows up routinely with sleep medicine/pulmonology. Insomnia/RLS  Patient states that she was previously on trazodone for her insomnia however is not currently taking at this time. She suffered insomnia in the setting of her worsening restless leg syndrome. She was prescribed duloxetine, however, states that she is no longer taking this as well. She is only taking Tylenol PM and states that the symptoms at this time are \"tolerable. \"  She was taper off of both trazodone and duloxetine back in April 2023. GERD  Patient denies any acute complaints today she continues to be on chronic PPI daily along with magnesium oxide denies any problems with her acid reflux at this time. Hx of Left Breast Cancer s/p Left Mastectomy with Chemo/Radiation  Per chart review, patient was supposed to have her recent chest port explanted. However, patient notes at today's visit that a repeat mammogram was done and showed right breast nodule. Patient had a biopsy on the mass/lesion which was negative for malignancy from what she states, back in August.  Because of this new finding, breast surgery recommending that the patient keep the port in until least February or March 2024. The patient is currently following with Dr. Sierra Marquez. Phobia of Flying   Patient notes that she [place] [facility] [place]. She usually has a fear of flying and takes as needed antianxiety medication only when flying. She is asking if she can get as needed medication at today's visit to aid her with her symptoms and her upcoming trip. ROS:  Review of Systems   Constitutional:  Negative for chills, diaphoresis, fatigue and fever. Respiratory:  Negative for cough, chest tightness, shortness of breath and wheezing. Cardiovascular:  Negative for chest pain, palpitations and leg swelling.    Gastrointestinal:

## 2023-10-10 ASSESSMENT — ENCOUNTER SYMPTOMS
DIARRHEA: 0
SHORTNESS OF BREATH: 0
COUGH: 0
NAUSEA: 0
BACK PAIN: 0
WHEEZING: 0
CHEST TIGHTNESS: 0
CONSTIPATION: 0
ABDOMINAL PAIN: 0
VOMITING: 0

## 2023-11-06 RX ORDER — LANOLIN ALCOHOL/MO/W.PET/CERES
400 CREAM (GRAM) TOPICAL 2 TIMES DAILY
Qty: 180 TABLET | Refills: 1 | Status: SHIPPED | OUTPATIENT
Start: 2023-11-06

## 2023-11-06 NOTE — TELEPHONE ENCOUNTER
Refill Request     CONFIRM preferred pharmacy with the patient. If Mail Order Rx - Pend for 90 day refill. Last Seen: Last Seen Department: 10/9/2023  Last Seen by PCP: 4/6/2023    Last Written: 5/18/23 180 tabs 1 refill    If no future appointment scheduled:  Review the last OV with PCP and review information for follow-up visit,  Route STAFF MESSAGE with patient name to the [facility] for scheduling with the following information:            -  Timing of next visit           -  Visit type ie Physical, OV, etc           -  Diagnoses/Reason ie. COPD, HTN - Do not use MEDICATION, Follow-up or CHECK UP - Give reason for visit      Next Appointment:   Future Appointments   Date Time Provider [facility] [address]   11/13/2023  4:20 PM MD Randa Mondragon [facility]   3/1/2024  2:00 PM [facility] EG  MAMMO [facility] EG [facility]   3/1/2024  2:30 PM [facility] EG [place] EG [facility]   4/11/2024  3:00 PM Gordon Montenegro MD [facility]   10/3/2024  7:00 AM Parag Louie MD [facility]       Message sent to [address] [place] [address] to schedule appt with patient?   NO      Requested Prescriptions     Pending Prescriptions Disp Refills    magnesium oxide (MAG-OX) 400 (240 Mg) MG tablet [Pharmacy Med Name: MAGNESIUM OXIDE 400 (240 MG) TABS] 180 tablet 1     Sig: TAKE ONE TABLET BY MOUTH TWO TIMES A DAY

## 2023-11-08 DIAGNOSIS — R79.89 ELEVATED LFTS: ICD-10-CM

## 2023-11-08 DIAGNOSIS — I10 PRIMARY HYPERTENSION: ICD-10-CM

## 2023-11-08 DIAGNOSIS — E78.49 OTHER HYPERLIPIDEMIA: ICD-10-CM

## 2023-11-08 LAB
ALBUMIN SERPL-MCNC: 4.5 G/DL (ref 3.4–5)
ALP SERPL-CCNC: 57 U/L (ref 40–129)
ALT SERPL-CCNC: 34 U/L (ref 10–40)
ANION GAP SERPL CALCULATED.3IONS-SCNC: 14 MMOL/L (ref 3–16)
AST SERPL-CCNC: 49 U/L (ref 15–37)
BILIRUB DIRECT SERPL-MCNC: <0.2 MG/DL (ref 0–0.3)
BILIRUB INDIRECT SERPL-MCNC: ABNORMAL MG/DL (ref 0–1)
BILIRUB SERPL-MCNC: <0.2 MG/DL (ref 0–1)
BUN SERPL-MCNC: 20 MG/DL (ref 7–20)
CALCIUM SERPL-MCNC: 9.9 MG/DL (ref 8.3–10.6)
CHLORIDE SERPL-SCNC: 99 MMOL/L (ref 99–110)
CO2 SERPL-SCNC: 26 MMOL/L (ref 21–32)
CREAT SERPL-MCNC: 0.8 MG/DL (ref 0.6–1.1)
CREAT UR-MCNC: 84.6 MG/DL (ref 28–259)
GFR SERPLBLD CREATININE-BSD FMLA CKD-EPI: >60 ML/MIN/{1.73_M2}
GLUCOSE SERPL-MCNC: 121 MG/DL (ref 70–99)
MICROALBUMIN UR DL<=1MG/L-MCNC: <1.2 MG/DL
MICROALBUMIN/CREAT UR: NORMAL MG/G (ref 0–30)
POTASSIUM SERPL-SCNC: 4.5 MMOL/L (ref 3.5–5.1)
PROT SERPL-MCNC: 7.3 G/DL (ref 6.4–8.2)
SODIUM SERPL-SCNC: 139 MMOL/L (ref 136–145)

## 2023-11-13 ENCOUNTER — OFFICE VISIT (OUTPATIENT)
Dept: ENDOCRINOLOGY | Age: 48
End: 2023-11-13
Payer: COMMERCIAL

## 2023-11-13 VITALS
SYSTOLIC BLOOD PRESSURE: 114 MMHG | HEIGHT: 62 IN | DIASTOLIC BLOOD PRESSURE: 66 MMHG | RESPIRATION RATE: 16 BRPM | BODY MASS INDEX: 53 KG/M2 | TEMPERATURE: 97 F | WEIGHT: 288 LBS | OXYGEN SATURATION: 97 % | HEART RATE: 78 BPM

## 2023-11-13 DIAGNOSIS — Z79.4 INSULIN DEPENDENT TYPE 2 DIABETES MELLITUS, CONTROLLED (HCC): Primary | ICD-10-CM

## 2023-11-13 DIAGNOSIS — E11.9 INSULIN DEPENDENT TYPE 2 DIABETES MELLITUS, CONTROLLED (HCC): Primary | ICD-10-CM

## 2023-11-13 PROCEDURE — 3044F HG A1C LEVEL LT 7.0%: CPT | Performed by: INTERNAL MEDICINE

## 2023-11-13 PROCEDURE — 3078F DIAST BP <80 MM HG: CPT | Performed by: INTERNAL MEDICINE

## 2023-11-13 PROCEDURE — 3074F SYST BP LT 130 MM HG: CPT | Performed by: INTERNAL MEDICINE

## 2023-11-13 PROCEDURE — 99214 OFFICE O/P EST MOD 30 MIN: CPT | Performed by: INTERNAL MEDICINE

## 2023-11-14 DIAGNOSIS — Z79.4 INSULIN DEPENDENT TYPE 2 DIABETES MELLITUS, CONTROLLED (HCC): ICD-10-CM

## 2023-11-14 DIAGNOSIS — E11.9 INSULIN DEPENDENT TYPE 2 DIABETES MELLITUS, CONTROLLED (HCC): ICD-10-CM

## 2023-11-15 RX ORDER — METFORMIN HYDROCHLORIDE 500 MG/1
TABLET, EXTENDED RELEASE ORAL
Qty: 180 TABLET | Refills: 1 | Status: SHIPPED | OUTPATIENT
Start: 2023-11-15

## 2023-12-07 ENCOUNTER — TELEPHONE (OUTPATIENT)
Dept: PULMONOLOGY | Age: 48
End: 2023-12-07

## 2023-12-07 NOTE — TELEPHONE ENCOUNTER
Ana M Miguel wants a new CPAP machine. She wants the ResMed AirSense 11. She wants an order sent to [address] [place] [address]. She needs the mask and all supplies as well.   [phone]

## 2023-12-08 ENCOUNTER — TELEPHONE (OUTPATIENT)
Dept: PULMONOLOGY | Age: 48
End: 2023-12-08

## 2023-12-08 NOTE — TELEPHONE ENCOUNTER
Rod needs the last OV note faxed to them. They also need the sleep study from around 2014 faxed to them.  Fax# [phone]

## 2023-12-28 DIAGNOSIS — I10 ESSENTIAL HYPERTENSION: ICD-10-CM

## 2023-12-28 RX ORDER — LISINOPRIL 40 MG/1
40 TABLET ORAL DAILY
Qty: 90 TABLET | Refills: 1 | Status: SHIPPED | OUTPATIENT
Start: 2023-12-28

## 2023-12-28 NOTE — TELEPHONE ENCOUNTER
Refill Request       Last Seen: Last Seen Department: 10/9/2023  Last Seen by PCP: 4/6/2023    Last Written: 07/10/23 qty 90 w/ 0     Next Appointment:   Future Appointments   Date Time Provider [address]   2/22/2024 11:20 AM [facility]   3/1/2024  2:00  [place]qui [place] EG WC MAMMO [facility] EG WC [place] [facility]   3/1/2024  2:30  [place] EG WC US MHCZ EG WC [place] [facility]   3/19/2024  4:00 PM MD [facility]eter [facility]   4/11/2024  3:00 PM Lindsey Muñoz MD [facility] AND RES [facility]   10/3/2024  7:00 AM Tam Roman MD [facility]       Future appointment scheduled      Requested Prescriptions     Pending Prescriptions Disp Refills    lisinopril (PRINIVIL;ZESTRIL) 40 MG tablet 90 tablet 0     Sig: Take 1 tablet by mouth daily

## 2024-01-08 ENCOUNTER — TELEPHONE (OUTPATIENT)
Dept: PHARMACY | Facility: CLINIC | Age: 49
End: 2024-01-08

## 2024-01-08 DIAGNOSIS — E78.49 OTHER HYPERLIPIDEMIA: ICD-10-CM

## 2024-01-08 DIAGNOSIS — I10 ESSENTIAL HYPERTENSION: ICD-10-CM

## 2024-01-08 RX ORDER — FENOFIBRATE 145 MG/1
145 TABLET, COATED ORAL DAILY
Qty: 90 TABLET | Refills: 1 | OUTPATIENT
Start: 2024-01-08

## 2024-01-08 RX ORDER — HYDROCHLOROTHIAZIDE 25 MG/1
25 TABLET ORAL DAILY
Qty: 90 TABLET | Refills: 1 | Status: SHIPPED | OUTPATIENT
Start: 2024-01-08

## 2024-01-08 NOTE — TELEPHONE ENCOUNTER
**Patient is [facility]**    Called patient to schedule 2024 yearly pharmacist appointment to discuss medications for Diabetes Management Program.    No answer. Left VM on mobile TAD  Please call back at [phone] Option #3.    Lawanda Brito CphT    [facility]    Clinical Pharmacy     Department, toll free: [phone] Option #3

## 2024-01-08 NOTE — TELEPHONE ENCOUNTER
Refill Request       Return in about 6 months (around 4/9/2024) for Chronic Care.       Last Seen: Last Seen Department: 10/9/2023  Last Seen by PCP: 4/6/2023    Last Written: 07/10/23 qty 90 w/ 0     Next Appointment:   Future Appointments   Date Time Provider Department Center   2/22/2024 11:20 AM [facility]   3/1/2024  2:00 PM [facility] EG WC MAMMO [facility] EG [facility] [place] [facility]   3/1/2024  2:30 PM [facility] EG WC US [facility] EG WC [place] [facility]   3/19/2024  4:00 PM Manuel Penn MD Kenwo Endo [facility]   4/11/2024  3:00 PM Juana Tracy MD MHC[facility] AND RES [facility]   10/3/2024  7:00 AM Sotero Delgado MD [facility]       Future appointment scheduled      Requested Prescriptions     Pending Prescriptions Disp Refills    hydroCHLOROthiazide (HYDRODIURIL) 25 MG tablet [Pharmacy Med Name: HYDROCHLOROTHIAZIDE 25MG TABS] 90 tablet 1     Sig: TAKE ONE TABLET BY MOUTH ONCE A DAY

## 2024-01-08 NOTE — TELEPHONE ENCOUNTER
Medication:   Requested Prescriptions     Pending Prescriptions Disp Refills    empagliflozin (JARDIANCE) 25 MG tablet 90 tablet 1     Sig: TAKE 1 TABLET BY MOUTH ONE TIME A DAY       Last Filled:      Patient Phone Number: [phone] (home) [phone] (work)    Last appt: 11/13/2023   Next appt: 3/19/2024    Last Labs DM:   Lab Results   Component Value Date/Time    LABA1C 6.3 09/19/2023 06:31 AM

## 2024-01-11 NOTE — TELEPHONE ENCOUNTER
Patient returned your call and made an appt on   Thursday Jan 25, 2024   Appt at 10:00 AM (30 min)       For Pharmacy Admin Tracking Only    Program: [employer]  CPA in place:  No  Recommendation Provided To: Patient/Caregiver: 1 via Telephone  Intervention Detail: Scheduled Appointment  Intervention Accepted By: Patient/Caregiver: 1  Gap Closed?: Yes   Time Spent (min): 5

## 2024-01-20 DIAGNOSIS — E78.49 OTHER HYPERLIPIDEMIA: ICD-10-CM

## 2024-01-22 RX ORDER — FENOFIBRATE 145 MG/1
145 TABLET, COATED ORAL DAILY
Qty: 90 TABLET | Refills: 1 | OUTPATIENT
Start: 2024-01-22

## 2024-01-24 ENCOUNTER — TELEPHONE (OUTPATIENT)
Dept: PHARMACY | Facility: CLINIC | Age: 49
End: 2024-01-24

## 2024-01-24 RX ORDER — TIRZEPATIDE 10 MG/.5ML
INJECTION, SOLUTION SUBCUTANEOUS
Qty: 13 ADJUSTABLE DOSE PRE-FILLED PEN SYRINGE | Refills: 2 | Status: SHIPPED | OUTPATIENT
Start: 2024-01-24

## 2024-01-24 NOTE — TELEPHONE ENCOUNTER
[facility] CLINICAL PHARMACY REVIEW - Be Well with Diabetes ([facility])    Marcela Arteaga is a 48 y.o. female enrolled in the [facility] Employee Diabetes Program. Patient provided writer with verbal consent to remain in the program for this year. Patient enrolled 2018.    Medications:  Current Outpatient Medications   Medication Instructions    AgaMatrix Ultra-Thin Lancets MISC CHECK BLOOD SUGARS BEFORE EVERY MEAL AND AT BEDTIME    aspirin (ASPIRIN LOW DOSE) 81 mg, Oral, DAILY    blood glucose test strips (AGAMATRIX PRESTO TEST) strip USE TO TEST BLOOD SUGARS FOUR TIMES DAILY    empagliflozin (JARDIANCE) 25 MG tablet TAKE 1 TABLET BY MOUTH ONE TIME A DAY    fenofibrate (TRICOR) 145 mg, Oral, DAILY    hydroCHLOROthiazide (HYDRODIURIL) 25 mg, Oral, DAILY    Insulin Pen Needle (B-D UF III MINI PEN NEEDLES) 31G X 5 MM MISC 1 each, Does not apply, 3 TIMES DAILY    Insulin Pen Needle (TRUEPLUS PEN NEEDLES) 32G X 4 MM MISC    *pt unsure why size changed to above, but is ok with 31G x5mm and reports sufficient supply   USE THREE TIMES A DAY    insulin regular human (HUMULIN R U-500 KWIKPEN) 500 UNIT/ML SOPN concentrated injection pen INJECT 100-120 UNITS UNDER THE SKIN BEFORE MEALS AS DIRECTED.  UNITS DAILY    lisinopril (PRINIVIL;ZESTRIL)    *pt reports taking 20mg daily \"forever\" - appears had tried to update with this appt/review last year and PCP declined/deferred to appt   40 mg, Oral, DAILY    loratadine (CLARITIN) 10 mg, Oral, 2 TIMES DAILY    magnesium oxide (MAG-OX) 400 mg, Oral, 2 TIMES DAILY    magnesium oxide (MAG-OX)    *duplicate   400 mg, Oral, 2 TIMES DAILY    metFORMIN (GLUCOPHAGE-XR) 500 MG extended release tablet TAKE ONE TABLET BY MOUTH TWICE A DAY    Mounjaro    *increasing to 10mg @2/5/24   7.5 mg, SubCUTAneous, WEEKLY    omeprazole (PRILOSEC) 20 MG delayed release capsule TAKE ONE CAPSULE BY MOUTH TWO TIMES A DAY    rosuvastatin (CRESTOR) 40 MG tablet TAKE ONE TABLET BY MOUTH

## 2024-01-25 RX ORDER — BLOOD-GLUCOSE CONTROL, LOW
EACH MISCELLANEOUS
Qty: 300 EACH | Refills: 3 | Status: SHIPPED | OUTPATIENT
Start: 2024-01-25

## 2024-01-25 RX ORDER — BLOOD SUGAR DIAGNOSTIC
1 STRIP MISCELLANEOUS 3 TIMES DAILY
Qty: 300 EACH | Refills: 3 | Status: SHIPPED | OUTPATIENT
Start: 2024-01-25

## 2024-01-25 RX ORDER — BLOOD-GLUCOSE METER
EACH MISCELLANEOUS
Qty: 1 KIT | Refills: 0 | Status: SHIPPED | OUTPATIENT
Start: 2024-01-25

## 2024-01-25 NOTE — TELEPHONE ENCOUNTER
Manuel Penn MD,  Your patient is currently enrolled in [employer] Program.   Please assist, orders pended for your signature/modification if you agree:  Prodigy BG meter, test strips and lancets (eligible for $0 cost for patient)  Patient prefers (vs her current meter/supplies will be @$10-15); again declines CGM      Thank you,  Trinh Akers, PharmD, [facility]  Population Health Pharmacist  [facility] Clinical Pharmacy  Department, toll free: [phone], option 3

## 2024-02-03 DIAGNOSIS — E78.49 OTHER HYPERLIPIDEMIA: ICD-10-CM

## 2024-02-05 RX ORDER — FENOFIBRATE 145 MG/1
145 TABLET, COATED ORAL DAILY
Qty: 90 TABLET | Refills: 1 | OUTPATIENT
Start: 2024-02-05

## 2024-02-06 DIAGNOSIS — E78.49 OTHER HYPERLIPIDEMIA: ICD-10-CM

## 2024-02-06 RX ORDER — FENOFIBRATE 145 MG/1
145 TABLET, COATED ORAL DAILY
Qty: 90 TABLET | Refills: 0 | Status: SHIPPED | OUTPATIENT
Start: 2024-02-06

## 2024-02-06 NOTE — TELEPHONE ENCOUNTER
Refill Request     CONFIRM preferred pharmacy with the patient.    If Mail Order Rx - Pend for 90 day refill.      Last Seen: Last Seen Department: 10/9/2023  Last Seen by PCP: 4/6/2023    Last Written: 7/10/23    If no future appointment scheduled:  Review the last OV with PCP and review information for follow-up visit,  Route STAFF MESSAGE with patient name to the  Pool for scheduling with the following information:            -  Timing of next visit           -  Visit type ie Physical, OV, etc           -  Diagnoses/Reason ie. COPD, HTN - Do not use MEDICATION, Follow-up or CHECK UP - Give reason for visit      Next Appointment:   Future Appointments   Date Time Provider Department Center   2/15/2024  7:20 AM Sotero Delgado MD [facility] PULM [facility]   3/1/2024  2:00 PM [facility] EG WC MAMMO [facility] EG WC [place] [facility]   3/1/2024  2:30 PM [facility] EG WC US [facility] EG WC [place] [facility]   3/19/2024  4:00 PM Manuel Penn MD [facility]   4/11/2024  3:00 PM Juana Tracy MD [facility] AND RES [facility]   10/3/2024  7:00 AM Sotero Delgado MD  PULM [facility]       Message sent to  to schedule appt with patient?  N/A      Requested Prescriptions     Pending Prescriptions Disp Refills    fenofibrate (TRICOR) 145 MG tablet 90 tablet 1     Sig: Take 1 tablet by mouth daily

## 2024-02-13 ENCOUNTER — TELEPHONE (OUTPATIENT)
Dept: PULMONOLOGY | Age: 49
End: 2024-02-13

## 2024-02-13 NOTE — TELEPHONE ENCOUNTER
Pt is scheduled for this Thursday for her new CPAP appointment. She wants to know if it needs to be changed to in person instead of virtual due to getting the download for compliance. Please follow up with pt.

## 2024-02-15 ENCOUNTER — OFFICE VISIT (OUTPATIENT)
Dept: PULMONOLOGY | Age: 49
End: 2024-02-15
Payer: COMMERCIAL

## 2024-02-15 VITALS
OXYGEN SATURATION: 97 % | WEIGHT: 279 LBS | HEIGHT: 62 IN | RESPIRATION RATE: 16 BRPM | TEMPERATURE: 97.2 F | BODY MASS INDEX: 51.34 KG/M2 | HEART RATE: 84 BPM | DIASTOLIC BLOOD PRESSURE: 70 MMHG | SYSTOLIC BLOOD PRESSURE: 120 MMHG

## 2024-02-15 DIAGNOSIS — G47.33 OSA ON CPAP: Primary | ICD-10-CM

## 2024-02-15 PROCEDURE — 3078F DIAST BP <80 MM HG: CPT | Performed by: INTERNAL MEDICINE

## 2024-02-15 PROCEDURE — 3074F SYST BP LT 130 MM HG: CPT | Performed by: INTERNAL MEDICINE

## 2024-02-15 PROCEDURE — 99214 OFFICE O/P EST MOD 30 MIN: CPT | Performed by: INTERNAL MEDICINE

## 2024-02-15 NOTE — ASSESSMENT & PLAN NOTE
Titration is not required during this visit.    PAP download information:  Usage > 4 hours  99 %   Pressure setting  14.9 cwp    AHI with usage  0.2     See media tab for full download data.    Marcela Arteaga  is deriving benefit from PAP demonstrated by improved [place], AHI, symptoms.

## 2024-02-15 NOTE — PROGRESS NOTES
REASON FOR CONSULTATION/CC: SUNDAY      PCP: Juana Tracy MD    HISTORY OF PRESENT ILLNESS: Marcela Arteaga is a 48 y.o. year old female with a history of SUNDAY who presents :     Sleep history:      SUNDAY   Using cpap nightly > 4 hours per day.  No issues. No complaints mask fit or humidification issues.  Knows to changes mask and hoses every 6 months.    No issues with new device.     Obesity   Disucssed.        [place] Sleepiness Scale:        2/15/2024     7:24 AM 9/28/2023     7:15 AM 1/17/2019     8:27 AM 12/12/2017     8:07 AM 6/8/2016     7:05 AM 12/2/2015     6:00 AM 8/26/2015     7:00 AM   Sleep Medicine   Sitting and reading 3 2 2 2 3 3 2   Watching TV 1 1 1 0 1 1 1   Sitting, inactive in a public place (e.g. a theatre or a meeting) 0 0 0 0 0 0 0   As a passenger in a car for an hour without a break 0 0 1 2 2 2 2   Lying down to rest in the afternoon when circumstances permit 1 2 1 0 2 2 2   Sitting and talking to someone 0 0 0 0 0 0 0   Sitting quietly after a lunch without alcohol 0 0 0 0 0 0 0   In a car, while stopped for a few minutes in traffic 0 0 0 0 0 0 0   [place] Sleepiness Score 5 5 5 4 8 8 7   Neck circumference (Inches)    17 17           0 = no chance of dozing  1 = slight chance of dozing  2 = moderate chance of dozing  3 = high chance of dozing    Interpretation:   0-7:It is unlikely that you are abnormally sleepy.   8-9:You have an average amount of daytime sleepiness.   10-15:You may be excessively sleepy depending on the situation. You may want to consider   seeking medical attention.   16-24:You are excessively sleepy and should consider seeking medical attention     Objective:   PHYSICAL EXAM:  Blood pressure 120/70, pulse 84, temperature 97.2 °F (36.2 °C), temperature source Infrared, resp. rate 16, height 1.575 m (5' 2.01\"), weight 126.6 kg (279 lb), SpO2 97 %, not currently breastfeeding.'  Gen: No distress.  Obese Body mass index is 51.02 kg/m².   ENT:   Resp: No accessory

## 2024-02-16 DIAGNOSIS — K21.9 GASTROESOPHAGEAL REFLUX DISEASE WITHOUT ESOPHAGITIS: ICD-10-CM

## 2024-02-16 NOTE — TELEPHONE ENCOUNTER
Refill Request       Last Seen: Last Seen Department: 10/9/2023  Last Seen by PCP: 4/6/2023    Last Written: 7/21/23 180 with 1    Next Appointment:   Future Appointments   Date Time Provider Department Center   3/1/2024  2:00 PM [facility] EG WC MAMMO [facility] EG  [place] [facility]   3/1/2024  2:30 PM [facility] EG WC US [facility] EG WC [place] [facility]   3/19/2024  4:00 PM Manuel Penn MD [facility]   4/11/2024  3:00 PM Juana Tracy MD [facility] AND RES [facility]       Requested Prescriptions     Pending Prescriptions Disp Refills    omeprazole (PRILOSEC) 20 MG delayed release capsule 180 capsule 1     Sig: Take 1 capsule by mouth 2 times daily

## 2024-02-19 RX ORDER — OMEPRAZOLE 20 MG/1
20 CAPSULE, DELAYED RELEASE ORAL 2 TIMES DAILY
Qty: 180 CAPSULE | Refills: 1 | Status: SHIPPED | OUTPATIENT
Start: 2024-02-19

## 2024-03-01 ENCOUNTER — HOSPITAL ENCOUNTER (OUTPATIENT)
Dept: WOMENS IMAGING | Age: 49
Discharge: HOME | End: 2024-03-01
Payer: COMMERCIAL

## 2024-03-01 VITALS — WEIGHT: 281 LBS | BODY MASS INDEX: 51.71 KG/M2 | HEIGHT: 62 IN

## 2024-03-01 DIAGNOSIS — Z85.3 PERSONAL HISTORY OF MALIGNANT NEOPLASM OF BREAST: ICD-10-CM

## 2024-03-01 DIAGNOSIS — R92.8 ABNORMAL MAMMOGRAM: ICD-10-CM

## 2024-03-01 PROCEDURE — G0279 TOMOSYNTHESIS, MAMMO: HCPCS

## 2024-03-01 PROCEDURE — 76642 ULTRASOUND BREAST LIMITED: CPT

## 2024-03-04 NOTE — PROGRESS NOTES
Marcela BARRON Lay    Age 48 y.o.    female    1975    MRN [id number]    5/14/2024  Arrival Time_____________  OR Time____________70 Min     Procedure(s):  PORT A CATHETER REMOVAL                      Monitor Anesthesia Care   Surgeon(s):  Kanika Patel, MD      DAY ADMIT ___  SDS/OP ___  OUTPT IN BED ___        Phone [phone] (home) [phone] (work)                 PCP _____________________ Phone_________________ Epic ( ) Epic CE ( ) Appt ________    NOTES: _________________________________________ Consult/Cardio _______________    ____________________________________________________________________________    ____________________________________________________________________________  PAT APPT DATE:________ TIME: ________  FAXED QAD: _______  (__) H&P w/ Hospitalist    (__) PAT orders in EPIC    (__) Meet with PAT nurse  __________________________________________________________________________  Preop Nurse phone screen complete: _____________  (__) CBC     (__) W/ DIFF ___________  (__) CT CHEST  __________   (__) Hgb A1C    ___________  (__) CHEST X RAY   __________  (__) LIPID PROFILE  ___________  (__) EKG   __________  (__) PT-INR / APTT  ___________  (__) PFT's   __________  (__) BMP   ___________  (__) CAROTIDS  __________  (__) CMP   ___________  (__) VEIN MAPPING  __________  (__) U/A   ___________  (__) HISTORY & PHYSICAL __________  (__) URINE C & S  ___________  (__) CARDIAC CLEARANCE __________  (__) U/A W/ FLEX  ___________  (__) PULM. CLEARANCE __________  (__) SERUM PREGNANCY ___________  (__) Preop Orders in EPIC __________  (__) TYPE & SCREEN __________repeat ( ) (__)  __________________ __________  (__) Albumin   ___________  (__)  __________________ __________  (__) TRANSFERRIN  ___________  (__)  __________________ __________  (__) LIVER PROFILE  ___________  (__) URINE PREG DOS __________  (__) MRSA NASAL SWAB ___________  (__) BLOOD SUGAR DOS __________  (__) SED

## 2024-03-19 ENCOUNTER — OFFICE VISIT (OUTPATIENT)
Dept: ENDOCRINOLOGY | Age: 49
End: 2024-03-19
Payer: COMMERCIAL

## 2024-03-19 VITALS
HEIGHT: 62 IN | RESPIRATION RATE: 15 BRPM | BODY MASS INDEX: 51.34 KG/M2 | OXYGEN SATURATION: 99 % | HEART RATE: 76 BPM | DIASTOLIC BLOOD PRESSURE: 62 MMHG | WEIGHT: 279 LBS | SYSTOLIC BLOOD PRESSURE: 105 MMHG

## 2024-03-19 DIAGNOSIS — E11.9 INSULIN DEPENDENT TYPE 2 DIABETES MELLITUS, CONTROLLED (HCC): Primary | ICD-10-CM

## 2024-03-19 DIAGNOSIS — Z79.4 INSULIN DEPENDENT TYPE 2 DIABETES MELLITUS, CONTROLLED (HCC): Primary | ICD-10-CM

## 2024-03-19 DIAGNOSIS — I10 PRIMARY HYPERTENSION: ICD-10-CM

## 2024-03-19 LAB — HBA1C MFR BLD: 6 %

## 2024-03-19 PROCEDURE — 83036 HEMOGLOBIN GLYCOSYLATED A1C: CPT | Performed by: INTERNAL MEDICINE

## 2024-03-19 PROCEDURE — 99214 OFFICE O/P EST MOD 30 MIN: CPT | Performed by: INTERNAL MEDICINE

## 2024-03-19 PROCEDURE — 3074F SYST BP LT 130 MM HG: CPT | Performed by: INTERNAL MEDICINE

## 2024-03-19 PROCEDURE — 3078F DIAST BP <80 MM HG: CPT | Performed by: INTERNAL MEDICINE

## 2024-03-19 NOTE — PROGRESS NOTES
Seen as f/u patient for diabetes      Interim:     Stable  No issues  26 lb weight loss    Dx with breast cancer  went through chemotherapy XRT  Had steroids with it    Diagnosed with Type 2 diabetes mellitus 4-5 yrs ago  Known diabetic complications: None    Current diabetic medications   U-500 pen 110 TID       SSI 5 for 50 >150     Metformin ER  500mg BID  jardiance 25mg'  Mounjaro 10mg    Previous  lantus 110 units HS  Novolog 25 TID + SSI 5 for 50>150  Usually takes 35-40  januvia 100mg  Was on victoza in the past for 2 months  Bydureon    Moderate, uncontrolled    Insulin started 3/13    Intolerance to diabetes medications: yes - Metformin     Last A1c 6%<----6.3%<---6.9%<-----6.9%<--- 6.9%<---7.1%<---- 7.4%<-----7.5%<------ 6.9%<-----7%<------6.6%<------6.6%<----- 6.8%<-----6.3 on 3/17<----6.3 on 12/16<----6.1 on 3/15  <---- 8.5 On 12/13<---- 9.6<---- 8.9 on 5/13<---10.8<---10.9    Prior visit with dietician: Yes   Current diet: on average, 3 meals per day 45gm CHO  Current exercise: No exercise for one month  Current monitoring regimen: home blood tests occ , mainly in evening    Has brought blood glucose log/meter:yes  Home blood sugar records:  Any episodes of hypoglycemia? occ    No Hx of CAD , PVD, CVA    Hyperlipidemia: pravastatin 40mg fenofibrate 145   on 10/14-switched to lipitor 80mg LDL 73 on 2/16  LDL 96  HDL 29 on 3/18     LDL 82 on 2/19  Tolerating , no aches  LDL 76 on 3/20  LDL 93 on 5/21       Last eye exam: 2023  Last foot exam:3/24  Last microalbumin to creatinine ratio: M/C nl on 5/19  ,   35 on 5/13 34 on 9/13  nl on 3/12---> 33.6 on 11/20----> 11/21 ---> 8/22--->11/23    HTN: Stable, tolerating Lisinopril 40mg HCTZ 25mg      Works from home    Review of Systems    Scanned, reviewed    Vitals:    03/19/24 1551   BP: 105/62   Pulse: 76   Resp: 15   SpO2: 99%         Constitutional: Well-developed, appears stated age, cooperative, in no acute

## 2024-04-10 SDOH — ECONOMIC STABILITY: FOOD INSECURITY: WITHIN THE PAST 12 MONTHS, THE FOOD YOU BOUGHT JUST DIDN'T LAST AND YOU DIDN'T HAVE MONEY TO GET MORE.: NEVER TRUE

## 2024-04-10 SDOH — ECONOMIC STABILITY: INCOME INSECURITY: HOW HARD IS IT FOR YOU TO PAY FOR THE VERY BASICS LIKE FOOD, HOUSING, MEDICAL CARE, AND HEATING?: NOT HARD AT ALL

## 2024-04-10 SDOH — ECONOMIC STABILITY: FOOD INSECURITY: WITHIN THE PAST 12 MONTHS, YOU WORRIED THAT YOUR FOOD WOULD RUN OUT BEFORE YOU GOT MONEY TO BUY MORE.: NEVER TRUE

## 2024-04-10 SDOH — ECONOMIC STABILITY: TRANSPORTATION INSECURITY
IN THE PAST 12 MONTHS, HAS LACK OF TRANSPORTATION KEPT YOU FROM MEETINGS, WORK, OR FROM GETTING THINGS NEEDED FOR DAILY LIVING?: NO

## 2024-04-10 ASSESSMENT — PATIENT HEALTH QUESTIONNAIRE - PHQ9
2. FEELING DOWN, DEPRESSED OR HOPELESS: NOT AT ALL
1. LITTLE INTEREST OR PLEASURE IN DOING THINGS: NOT AT ALL
SUM OF ALL RESPONSES TO PHQ QUESTIONS 1-9: 0
1. LITTLE INTEREST OR PLEASURE IN DOING THINGS: NOT AT ALL
SUM OF ALL RESPONSES TO PHQ QUESTIONS 1-9: 0
2. FEELING DOWN, DEPRESSED OR HOPELESS: NOT AT ALL
SUM OF ALL RESPONSES TO PHQ9 QUESTIONS 1 & 2: 0
SUM OF ALL RESPONSES TO PHQ QUESTIONS 1-9: 0
SUM OF ALL RESPONSES TO PHQ QUESTIONS 1-9: 0
SUM OF ALL RESPONSES TO PHQ9 QUESTIONS 1 & 2: 0

## 2024-04-11 ENCOUNTER — OFFICE VISIT (OUTPATIENT)
Dept: PRIMARY CARE CLINIC | Age: 49
End: 2024-04-11
Payer: COMMERCIAL

## 2024-04-11 VITALS
HEART RATE: 92 BPM | TEMPERATURE: 98.3 F | OXYGEN SATURATION: 97 % | DIASTOLIC BLOOD PRESSURE: 82 MMHG | WEIGHT: 277 LBS | BODY MASS INDEX: 50.66 KG/M2 | RESPIRATION RATE: 14 BRPM | SYSTOLIC BLOOD PRESSURE: 134 MMHG

## 2024-04-11 DIAGNOSIS — G47.33 OSA ON CPAP: ICD-10-CM

## 2024-04-11 DIAGNOSIS — E78.49 OTHER HYPERLIPIDEMIA: ICD-10-CM

## 2024-04-11 DIAGNOSIS — B97.7 HIGH RISK HPV INFECTION: ICD-10-CM

## 2024-04-11 DIAGNOSIS — E78.5 DM TYPE 2 WITH DIABETIC DYSLIPIDEMIA (HCC): Primary | ICD-10-CM

## 2024-04-11 DIAGNOSIS — Z17.1 MALIGNANT NEOPLASM OF OVERLAPPING SITES OF LEFT BREAST IN FEMALE, ESTROGEN RECEPTOR NEGATIVE (HCC): ICD-10-CM

## 2024-04-11 DIAGNOSIS — C50.812 MALIGNANT NEOPLASM OF OVERLAPPING SITES OF LEFT BREAST IN FEMALE, ESTROGEN RECEPTOR NEGATIVE (HCC): ICD-10-CM

## 2024-04-11 DIAGNOSIS — F40.243 FLYING PHOBIA: ICD-10-CM

## 2024-04-11 DIAGNOSIS — E83.42 HYPOMAGNESEMIA: ICD-10-CM

## 2024-04-11 DIAGNOSIS — I10 PRIMARY HYPERTENSION: ICD-10-CM

## 2024-04-11 DIAGNOSIS — K21.9 GASTROESOPHAGEAL REFLUX DISEASE WITHOUT ESOPHAGITIS: ICD-10-CM

## 2024-04-11 DIAGNOSIS — E11.69 DM TYPE 2 WITH DIABETIC DYSLIPIDEMIA (HCC): Primary | ICD-10-CM

## 2024-04-11 PROCEDURE — 3079F DIAST BP 80-89 MM HG: CPT | Performed by: FAMILY MEDICINE

## 2024-04-11 PROCEDURE — 36415 COLL VENOUS BLD VENIPUNCTURE: CPT | Performed by: FAMILY MEDICINE

## 2024-04-11 PROCEDURE — 99214 OFFICE O/P EST MOD 30 MIN: CPT | Performed by: FAMILY MEDICINE

## 2024-04-11 PROCEDURE — 3044F HG A1C LEVEL LT 7.0%: CPT | Performed by: FAMILY MEDICINE

## 2024-04-11 PROCEDURE — 3075F SYST BP GE 130 - 139MM HG: CPT | Performed by: FAMILY MEDICINE

## 2024-04-11 RX ORDER — ROSUVASTATIN CALCIUM 40 MG/1
TABLET, COATED ORAL
Qty: 90 TABLET | Refills: 1 | Status: SHIPPED | OUTPATIENT
Start: 2024-04-11

## 2024-04-11 RX ORDER — LISINOPRIL AND HYDROCHLOROTHIAZIDE 25; 20 MG/1; MG/1
1 TABLET ORAL DAILY
Qty: 90 TABLET | Refills: 1 | Status: SHIPPED | OUTPATIENT
Start: 2024-04-11

## 2024-04-11 RX ORDER — FENOFIBRATE 145 MG/1
145 TABLET, COATED ORAL DAILY
Qty: 90 TABLET | Refills: 1 | Status: SHIPPED | OUTPATIENT
Start: 2024-04-11

## 2024-04-11 RX ORDER — DIAZEPAM 2 MG/1
TABLET ORAL
Qty: 5 TABLET | Refills: 0 | Status: SHIPPED | OUTPATIENT
Start: 2024-04-11 | End: 2024-05-02

## 2024-04-11 RX ORDER — MECLIZINE HCL 12.5 MG/1
12.5 TABLET ORAL 3 TIMES DAILY PRN
Qty: 15 TABLET | Refills: 0 | Status: SHIPPED | OUTPATIENT
Start: 2024-04-11 | End: 2024-04-21

## 2024-04-11 NOTE — PATIENT INSTRUCTIONS
Call ob/gyn to find out when next HPV test is due since you had a positive HPV test October 2023.

## 2024-04-11 NOTE — PROGRESS NOTES
Lab preformed in R arm and sent number of tubes below to be processed.   1 attempt made and stopped bleeding by applying band aide and guaze.     1 Yellow    0 purple    0 blue    0 Urine    
Negative fever or fatigue   Eyes:  Negative for eye pain or visual changes  Resp:  Negative for SOB, chest tightness, cough  Cardiovascular: Negative for CP, palpitations, LOMBARDO, orthopnea, PND, LE edema  Gastrointestinal: Negative for abd pain, melena, BRBPR, N/V/D  Endocrine:  Negative for polydipsia and polyuria  :  Negative for dysuria, flank pain or urinary frequency  Musculoskeletal:  Negative for back pain or myalgias  Neuro:  Negative for dizziness or lightheadedness  Psych: negative for depression or anxiety      Vitals:    04/11/24 1456   BP: 134/82   Site: Left Upper Arm   Position: Sitting   Cuff Size: Small Adult   Pulse: 92   Resp: 14   Temp: 98.3 °F (36.8 °C)   TempSrc: Oral   SpO2: 97%   Weight: 125.6 kg (277 lb)         Outpatient Medications Marked as Taking for the 4/11/24 encounter (Office Visit) with Juana Tracy MD   Medication Sig Dispense Refill    lisinopril-hydroCHLOROthiazide (PRINZIDE;ZESTORETIC) 20-25 MG per tablet Take 1 tablet by mouth daily 90 tablet 1    rosuvastatin (CRESTOR) 40 MG tablet TAKE ONE TABLET BY MOUTH ONE TIME A DAY. THIS IS REPLACING ATORVASTATIN 90 tablet 1    fenofibrate (TRICOR) 145 MG tablet Take 1 tablet by mouth daily 90 tablet 1    meclizine (ANTIVERT) 12.5 MG tablet Take 1 tablet by mouth 3 times daily as needed for Dizziness 15 tablet 0    diazePAM (VALIUM) 2 MG tablet Take 1 hour prior to flight.  Can repeat x 1 if needed 5 tablet 0    omeprazole (PRILOSEC) 20 MG delayed release capsule Take 1 capsule by mouth 2 times daily 180 capsule 1    Blood Glucose Monitoring Suppl (PRODIGY AUTOCODE BLOOD GLUCOSE) w/Device KIT Use as directed to test up to 3 time daily 1 kit 0    Prodigy Lancets 28G MISC Use as directed to test up to 3 time daily 300 each 3    blood glucose test strips (PRODIGY NO CODING BLOOD GLUC) strip 1 each by In Vitro route 3 times daily As needed. 300 each 3    Tirzepatide (MOUNJARO) 10 MG/0.5ML SOPN SC injection 10 mg SC weekly 13

## 2024-04-12 LAB
ALBUMIN SERPL-MCNC: 4.7 G/DL (ref 3.4–5)
ALBUMIN/GLOB SERPL: 1.6 {RATIO} (ref 1.1–2.2)
ALP SERPL-CCNC: 64 U/L (ref 40–129)
ALT SERPL-CCNC: 32 U/L (ref 10–40)
ANION GAP SERPL CALCULATED.3IONS-SCNC: 15 MMOL/L (ref 3–16)
AST SERPL-CCNC: 45 U/L (ref 15–37)
BILIRUB SERPL-MCNC: <0.2 MG/DL (ref 0–1)
BUN SERPL-MCNC: 20 MG/DL (ref 7–20)
CALCIUM SERPL-MCNC: 10.5 MG/DL (ref 8.3–10.6)
CHLORIDE SERPL-SCNC: 99 MMOL/L (ref 99–110)
CO2 SERPL-SCNC: 24 MMOL/L (ref 21–32)
CREAT SERPL-MCNC: 0.9 MG/DL (ref 0.6–1.1)
GFR SERPLBLD CREATININE-BSD FMLA CKD-EPI: 78 ML/MIN/{1.73_M2}
GLUCOSE SERPL-MCNC: 130 MG/DL (ref 70–99)
MAGNESIUM SERPL-MCNC: 1.8 MG/DL (ref 1.8–2.4)
POTASSIUM SERPL-SCNC: 4.4 MMOL/L (ref 3.5–5.1)
PROT SERPL-MCNC: 7.6 G/DL (ref 6.4–8.2)
SODIUM SERPL-SCNC: 138 MMOL/L (ref 136–145)

## 2024-04-18 ENCOUNTER — TELEPHONE (OUTPATIENT)
Dept: ENDOCRINOLOGY | Age: 49
End: 2024-04-18

## 2024-04-18 NOTE — TELEPHONE ENCOUNTER
Submitted PA for Mounjaro  [facility] Key: [id number]  STATUS: PENDING.    Follow up done daily; if no decision with in three days we will refax.  If another three days goes by with no decision will call the insurance for status.

## 2024-04-19 NOTE — TELEPHONE ENCOUNTER
Approved today  The request has been approved. The authorization is effective from 04/19/2024 to 04/18/2025, as long as the member is enrolled in their current health plan.

## 2024-04-25 ENCOUNTER — CLINICAL DOCUMENTATION (OUTPATIENT)
Dept: PHARMACY | Facility: CLINIC | Age: 49
End: 2024-04-25

## 2024-04-25 NOTE — PROGRESS NOTES
1st Quarterly Reminder sent to patient for the DM Program - See Mychart message or Letter for more information.    Maryan Rossi CphT  [facility]  Clinical Pharmacy   Department, toll free: [phone] Option #3      For Pharmacy Admin Tracking Only    Program: [employer]  CPA in place:  No  Gap Closed?: Yes   Time Spent (min): 5

## 2024-05-08 NOTE — PROGRESS NOTES
Surgery Date and Time: 5/14/24 @ 7:30 am     Arrival Time:   6:00 am    The instructions given when and if a patient needs to stop oral intake prior to surgery varies. Follow the instructions you were given by your    Surgeon or RN during the Pre-op call.       __X__Nothing to eat or to drink after Midnight the night before the surgery. NO gum, mints, candy or ice chips day of surgery.                  Only take the following medications with a small sip of water the morning of surgery:  NONE (no insulin or Metformin morning of surgery)                 Hold Mounjaro one week prior to surgery & hold Jardiance 3 days prior to surgery - last dose 5/10 per Anesthesia request                 Hold the following medications (per anesthesia or surgeon request):  Lisinopril/HCTZ - hold 24 hrs prior    Last Dose: 5/12 9 am      Aspirin, Ibuprofen, Advil, Naproxen, Vitamin E and other Anti-inflammatory products and supplements should be stopped for 5 -7days before surgery      or as directed by your physician.  Last dose for Aspirin 5/8                - If you take a long acting-insulin, please ask your Primary Care Physician if you should decrease your dose the night before surgery.    - Do not smoke or vape, and do not drink any alcoholic beverages 24 hours prior to surgery, this includes NA Beer. Refrain from using any recreational drugs,     including non-prescribed prescription drugs.     -You may brush your teeth and gargle the morning of surgery.  DO NOT SWALLOW WATER.    -You MUST plan for a responsible adult to stay on site while you are here and take you home after your surgery. You will not be allowed to leave alone or drive               yourself home. It is requested someone stay with you the first 24 hrs. Your surgery will be cancelled if you do not have a ride home with a responsible adult.    -Please wear simple, loose-fitting clothing to the hospital. Do not bring valuables (money, credit cards, checkbooks,

## 2024-05-13 ENCOUNTER — ANESTHESIA EVENT (OUTPATIENT)
Dept: OPERATING ROOM | Age: 49
End: 2024-05-13
Payer: COMMERCIAL

## 2024-05-13 NOTE — ANESTHESIA PRE PROCEDURE
Department of Anesthesiology  Preprocedure Note       Name:  Marcela Arteaga   Age:  48 y.o.  :  1975                                          MRN:  [id number]         Date:  2024      Surgeon: Surgeon(s):  Kanika Patel MD    Procedure: Procedure(s):  PORT A CATHETER REMOVAL    Medications prior to admission:   Prior to Admission medications    Medication Sig Start Date End Date Taking? Authorizing Provider   lisinopril-hydroCHLOROthiazide (PRINZIDE;ZESTORETIC) 20-25 MG per tablet Take 1 tablet by mouth daily 24   Juana Tracy MD   rosuvastatin (CRESTOR) 40 MG tablet TAKE ONE TABLET BY MOUTH ONE TIME A DAY. THIS IS REPLACING ATORVASTATIN  Patient taking differently: Take 1 tablet by mouth daily TAKE ONE TABLET BY MOUTH ONE TIME A DAY. THIS IS REPLACING ATORVASTATIN 24   Juana Tracy MD   fenofibrate (TRICOR) 145 MG tablet Take 1 tablet by mouth daily 24   Juana Tracy MD   omeprazole (PRILOSEC) 20 MG delayed release capsule Take 1 capsule by mouth 2 times daily 24   Juana Tracy MD   Blood Glucose Monitoring Suppl (PRODIGY AUTOCODE BLOOD GLUCOSE) w/Device KIT Use as directed to test up to 3 time daily 24   Manuel Penn MD Prodigy Lancets 28G MISC Use as directed to test up to 3 time daily 24   Manuel Penn MD   blood glucose test strips (PRODIGY NO CODING BLOOD GLUC) strip 1 each by In Vitro route 3 times daily As needed. 24   Manuel Penn MD   Tirzepatide (MOUNJARO) 10 MG/0.5ML SOPN SC injection 10 mg SC weekly  Patient taking differently: Inject 0.5 mLs into the skin once a week 10 mg SC weekly/ (last dose 2 weeks ago - on backorder) 24   Manuel Penn MD   empagliflozin (JARDIANCE) 25 MG tablet TAKE 1 TABLET BY MOUTH ONE TIME A DAY  Patient taking differently: Take 1 tablet by mouth daily TAKE 1 TABLET BY MOUTH ONE TIME A DAY 24   Manuel Penn MD   metFORMIN

## 2024-05-14 ENCOUNTER — ANESTHESIA (OUTPATIENT)
Dept: OPERATING ROOM | Age: 49
End: 2024-05-14
Payer: COMMERCIAL

## 2024-05-14 ENCOUNTER — HOSPITAL ENCOUNTER (OUTPATIENT)
Age: 49
Setting detail: OUTPATIENT SURGERY
Discharge: HOME | End: 2024-05-14
Attending: SURGERY | Admitting: SURGERY
Payer: COMMERCIAL

## 2024-05-14 VITALS
HEIGHT: 62 IN | SYSTOLIC BLOOD PRESSURE: 125 MMHG | TEMPERATURE: 97.2 F | DIASTOLIC BLOOD PRESSURE: 62 MMHG | BODY MASS INDEX: 51.53 KG/M2 | RESPIRATION RATE: 20 BRPM | WEIGHT: 280 LBS | HEART RATE: 76 BPM | OXYGEN SATURATION: 96 %

## 2024-05-14 PROBLEM — Z85.3 PERSONAL HISTORY OF BREAST CANCER: Status: ACTIVE | Noted: 2024-05-14

## 2024-05-14 LAB
GLUCOSE BLD-MCNC: 165 MG/DL (ref 70–99)
GLUCOSE BLD-MCNC: 177 MG/DL (ref 70–99)
PERFORMED ON: ABNORMAL
PERFORMED ON: ABNORMAL

## 2024-05-14 PROCEDURE — 2709999900 HC NON-CHARGEABLE SUPPLY: Performed by: SURGERY

## 2024-05-14 PROCEDURE — 2580000003 HC RX 258: Performed by: NURSE ANESTHETIST, CERTIFIED REGISTERED

## 2024-05-14 PROCEDURE — 3600000012 HC SURGERY LEVEL 2 ADDTL 15MIN: Performed by: SURGERY

## 2024-05-14 PROCEDURE — 2500000003 HC RX 250 WO HCPCS: Performed by: SURGERY

## 2024-05-14 PROCEDURE — 6360000002 HC RX W HCPCS: Performed by: SURGERY

## 2024-05-14 PROCEDURE — A4217 STERILE WATER/SALINE, 500 ML: HCPCS | Performed by: SURGERY

## 2024-05-14 PROCEDURE — 2580000003 HC RX 258: Performed by: SURGERY

## 2024-05-14 PROCEDURE — 2580000003 HC RX 258: Performed by: ANESTHESIOLOGY

## 2024-05-14 PROCEDURE — 3600000002 HC SURGERY LEVEL 2 BASE: Performed by: SURGERY

## 2024-05-14 PROCEDURE — 2500000003 HC RX 250 WO HCPCS: Performed by: NURSE ANESTHETIST, CERTIFIED REGISTERED

## 2024-05-14 PROCEDURE — 7100000011 HC PHASE II RECOVERY - ADDTL 15 MIN: Performed by: SURGERY

## 2024-05-14 PROCEDURE — 3700000000 HC ANESTHESIA ATTENDED CARE: Performed by: SURGERY

## 2024-05-14 PROCEDURE — 3700000001 HC ADD 15 MINUTES (ANESTHESIA): Performed by: SURGERY

## 2024-05-14 PROCEDURE — 7100000010 HC PHASE II RECOVERY - FIRST 15 MIN: Performed by: SURGERY

## 2024-05-14 PROCEDURE — 6360000002 HC RX W HCPCS: Performed by: NURSE ANESTHETIST, CERTIFIED REGISTERED

## 2024-05-14 RX ORDER — DIPHENHYDRAMINE HYDROCHLORIDE 50 MG/ML
12.5 INJECTION INTRAMUSCULAR; INTRAVENOUS
Status: CANCELLED | OUTPATIENT
Start: 2024-05-14 | End: 2024-05-15

## 2024-05-14 RX ORDER — MAGNESIUM HYDROXIDE 1200 MG/15ML
LIQUID ORAL CONTINUOUS PRN
Status: COMPLETED | OUTPATIENT
Start: 2024-05-14 | End: 2024-05-14

## 2024-05-14 RX ORDER — SODIUM CHLORIDE 9 MG/ML
INJECTION, SOLUTION INTRAVENOUS PRN
Status: CANCELLED | OUTPATIENT
Start: 2024-05-14

## 2024-05-14 RX ORDER — OXYCODONE HYDROCHLORIDE 5 MG/1
5 TABLET ORAL PRN
Status: CANCELLED | OUTPATIENT
Start: 2024-05-14 | End: 2024-05-14

## 2024-05-14 RX ORDER — FENTANYL CITRATE 50 UG/ML
INJECTION, SOLUTION INTRAMUSCULAR; INTRAVENOUS PRN
Status: DISCONTINUED | OUTPATIENT
Start: 2024-05-14 | End: 2024-05-14 | Stop reason: SDUPTHER

## 2024-05-14 RX ORDER — SODIUM CHLORIDE 0.9 % (FLUSH) 0.9 %
5-40 SYRINGE (ML) INJECTION PRN
Status: CANCELLED | OUTPATIENT
Start: 2024-05-14

## 2024-05-14 RX ORDER — OXYCODONE HYDROCHLORIDE 5 MG/1
10 TABLET ORAL PRN
Status: CANCELLED | OUTPATIENT
Start: 2024-05-14 | End: 2024-05-14

## 2024-05-14 RX ORDER — SODIUM CHLORIDE 0.9 % (FLUSH) 0.9 %
5-40 SYRINGE (ML) INJECTION EVERY 12 HOURS SCHEDULED
Status: DISCONTINUED | OUTPATIENT
Start: 2024-05-14 | End: 2024-05-14 | Stop reason: HOSPADM

## 2024-05-14 RX ORDER — NALOXONE HYDROCHLORIDE 0.4 MG/ML
INJECTION, SOLUTION INTRAMUSCULAR; INTRAVENOUS; SUBCUTANEOUS PRN
Status: CANCELLED | OUTPATIENT
Start: 2024-05-14

## 2024-05-14 RX ORDER — PROPOFOL 10 MG/ML
INJECTION, EMULSION INTRAVENOUS PRN
Status: DISCONTINUED | OUTPATIENT
Start: 2024-05-14 | End: 2024-05-14 | Stop reason: SDUPTHER

## 2024-05-14 RX ORDER — ONDANSETRON 2 MG/ML
4 INJECTION INTRAMUSCULAR; INTRAVENOUS
Status: CANCELLED | OUTPATIENT
Start: 2024-05-14

## 2024-05-14 RX ORDER — LABETALOL HYDROCHLORIDE 5 MG/ML
5 INJECTION, SOLUTION INTRAVENOUS EVERY 10 MIN PRN
Status: CANCELLED | OUTPATIENT
Start: 2024-05-14

## 2024-05-14 RX ORDER — SODIUM CHLORIDE 0.9 % (FLUSH) 0.9 %
5-40 SYRINGE (ML) INJECTION EVERY 12 HOURS SCHEDULED
Status: CANCELLED | OUTPATIENT
Start: 2024-05-14

## 2024-05-14 RX ORDER — SODIUM CHLORIDE 0.9 % (FLUSH) 0.9 %
5-40 SYRINGE (ML) INJECTION PRN
Status: DISCONTINUED | OUTPATIENT
Start: 2024-05-14 | End: 2024-05-14 | Stop reason: HOSPADM

## 2024-05-14 RX ORDER — SODIUM CHLORIDE, SODIUM LACTATE, POTASSIUM CHLORIDE, CALCIUM CHLORIDE 600; 310; 30; 20 MG/100ML; MG/100ML; MG/100ML; MG/100ML
INJECTION, SOLUTION INTRAVENOUS CONTINUOUS
Status: DISCONTINUED | OUTPATIENT
Start: 2024-05-14 | End: 2024-05-14 | Stop reason: HOSPADM

## 2024-05-14 RX ORDER — MEPERIDINE HYDROCHLORIDE 50 MG/ML
12.5 INJECTION INTRAMUSCULAR; INTRAVENOUS; SUBCUTANEOUS EVERY 5 MIN PRN
Status: CANCELLED | OUTPATIENT
Start: 2024-05-14

## 2024-05-14 RX ORDER — MIDAZOLAM HYDROCHLORIDE 1 MG/ML
INJECTION INTRAMUSCULAR; INTRAVENOUS PRN
Status: DISCONTINUED | OUTPATIENT
Start: 2024-05-14 | End: 2024-05-14 | Stop reason: SDUPTHER

## 2024-05-14 RX ORDER — LIDOCAINE HYDROCHLORIDE 10 MG/ML
1 INJECTION, SOLUTION EPIDURAL; INFILTRATION; INTRACAUDAL; PERINEURAL
Status: DISCONTINUED | OUTPATIENT
Start: 2024-05-14 | End: 2024-05-14 | Stop reason: HOSPADM

## 2024-05-14 RX ORDER — SODIUM CHLORIDE 9 MG/ML
INJECTION, SOLUTION INTRAVENOUS PRN
Status: DISCONTINUED | OUTPATIENT
Start: 2024-05-14 | End: 2024-05-14 | Stop reason: HOSPADM

## 2024-05-14 RX ORDER — KETAMINE HCL IN NACL, ISO-OSM 100MG/10ML
SYRINGE (ML) INJECTION PRN
Status: DISCONTINUED | OUTPATIENT
Start: 2024-05-14 | End: 2024-05-14 | Stop reason: SDUPTHER

## 2024-05-14 RX ADMIN — DEXMEDETOMIDINE HYDROCHLORIDE 6 MCG: 100 INJECTION, SOLUTION INTRAVENOUS at 07:35

## 2024-05-14 RX ADMIN — FENTANYL CITRATE 25 MCG: 50 INJECTION, SOLUTION INTRAMUSCULAR; INTRAVENOUS at 07:47

## 2024-05-14 RX ADMIN — FENTANYL CITRATE 25 MCG: 50 INJECTION, SOLUTION INTRAMUSCULAR; INTRAVENOUS at 07:39

## 2024-05-14 RX ADMIN — Medication 10 MG: at 07:53

## 2024-05-14 RX ADMIN — PROPOFOL 20 MG: 10 INJECTION, EMULSION INTRAVENOUS at 07:53

## 2024-05-14 RX ADMIN — Medication 10 MG: at 07:28

## 2024-05-14 RX ADMIN — FENTANYL CITRATE 25 MCG: 50 INJECTION, SOLUTION INTRAMUSCULAR; INTRAVENOUS at 07:30

## 2024-05-14 RX ADMIN — SODIUM CHLORIDE, SODIUM LACTATE, POTASSIUM CHLORIDE, AND CALCIUM CHLORIDE: .6; .31; .03; .02 INJECTION, SOLUTION INTRAVENOUS at 07:25

## 2024-05-14 RX ADMIN — PROPOFOL 20 MG: 10 INJECTION, EMULSION INTRAVENOUS at 07:40

## 2024-05-14 RX ADMIN — Medication 10 MG: at 07:35

## 2024-05-14 RX ADMIN — Medication 3000 MG: at 07:25

## 2024-05-14 RX ADMIN — DEXMEDETOMIDINE HYDROCHLORIDE 6 MCG: 100 INJECTION, SOLUTION INTRAVENOUS at 07:30

## 2024-05-14 RX ADMIN — PROPOFOL 20 MG: 10 INJECTION, EMULSION INTRAVENOUS at 07:32

## 2024-05-14 RX ADMIN — FENTANYL CITRATE 25 MCG: 50 INJECTION, SOLUTION INTRAMUSCULAR; INTRAVENOUS at 07:43

## 2024-05-14 RX ADMIN — PROPOFOL 20 MG: 10 INJECTION, EMULSION INTRAVENOUS at 07:47

## 2024-05-14 RX ADMIN — MIDAZOLAM 2 MG: 1 INJECTION INTRAMUSCULAR; INTRAVENOUS at 07:25

## 2024-05-14 ASSESSMENT — PAIN - FUNCTIONAL ASSESSMENT
PAIN_FUNCTIONAL_ASSESSMENT: 0-10

## 2024-05-14 NOTE — PROGRESS NOTES
Patient arrived to phase 2, vital signs stable, denies pain. Awake and alert and oriented. Family will wait in car. Tolerating PO. Will continue to monitor.

## 2024-05-14 NOTE — DISCHARGE INSTRUCTIONS
Postoperative Instructions for Breast Surgery (Port Removal)  These are general guidelines to help assist in recovery after breast surgery. Please keep in mind all patients recover differently, so please call the office with any questions, [phone] ([phone]).    Pain management   You may feel mild to moderate discomfort when anesthesia wears off   You may take Tylenol or extra strength Tylenol for any discomfort   AVOID aspirin, Excedrin, ibuprofen, and other NSAIDS immediately after surgery for at least 24 hours.  Pain should improve, not worsen as days pass. If pain worsens, please call the office immediately.    Wound care   Your incision has dissolvable stitches under the skin and surgical strength skin glue.   Do not place ointment or lotions on your incision   You may shower in 24-48 hours. The water may run over your wounds but do not scrub the surgical glue. The incisions dry after showering with a clean towel.   Do not take a tub bath where your incision will be submerged into water until it is fully healed in 4-8 weeks   Do not swim with a fresh incision   Swelling and bruising in the surgical site is considered normal. It is not normal to have excessive bleeding or drainage from the wound. If you notice this, please call the office immediately.   Please call the office if you notice a fever as this may be a sign of infection.     Activity   You may resume most daily activities the day after surgery   Avoid strenuous activity, heavy lifting, and vigorous exercise for the next 48 hours  Walking is in normal activity that can be restarted right away   Avoid any direct trauma to the surgical area   You may resume driving when you are pain free and you feel safe turning the wheel and stopping quickly     Diet   You may encounter nausea following surgery. Drink clear liquids or popsicles until you are feeling better.   You have no diet restrictions and may resume a regular healthy diet immediately.   You can

## 2024-05-14 NOTE — H&P
Marcela Arteaga is an 48 y.o. female.    Past Medical History:   Diagnosis Date    Anxiety     BRCA1 negative     BRCA2 negative     Breast cancer (HCC)     DM type 2 with diabetic dyslipidemia (HCC)     Family history of BRCA gene positive     mom was BRCA positive, pt has never been tested    Family history of breast cancer in female     GERD (gastroesophageal reflux disease)     History of tobacco abuse     Hyperlipidemia     Hypertension     Hypomagnesemia     dx feb 2015, on PPI. started on replacement    Low grade squamous intraepith lesion on cytologic smear cervix (lgsil)     october 2018    Malignant neoplasm of overlapping sites of left breast in female, estrogen receptor negative (HCC)     breast left    Obesity     SUNDAY on CPAP        Allergies: No Known Allergies    Active Problems:    * No active hospital problems. *  Resolved Problems:    * No resolved hospital problems. *    Blood pressure (!) 144/79, pulse 88, temperature 97.1 °F (36.2 °C), temperature source Temporal, resp. rate 20, height 1.575 m (5' 2\"), weight 127 kg (280 lb), SpO2 96 %, not currently breastfeeding.    Review of Systems  Constitutional:Negative for fever, chills, and unexpected weight change.   HENT: Negative for hearing loss, trouble swallowing and voice change.    Eyes: Negative for visual disturbance.   Respiratory: Negative for cough, shortness of breath and wheezing.    Cardiovascular: Negative for chest pain, palpitations and leg swelling.   Gastrointestinal: Negative for abdominal pain, diarrhea, nausea and vomiting.   Endocrine: Negative for cold intolerance and heat intolerance.   Musculoskeletal: Negative for arthralgias, gait problem and myalgias.   Skin: Negative for rash and wound.   Neurological: Negative for syncope, weakness, numbness and headaches.   Hematological: Negative for bruises/bleeds easily.   Psychiatric/Behavioral: Negative for dysphoric mood. The patient is not nervous/anxious.      Physical

## 2024-05-14 NOTE — ANESTHESIA POSTPROCEDURE EVALUATION
Department of Anesthesiology  Postprocedure Note    Patient: Marcela Arteaga  MRN: [id number]  YOB: 1975  Date of evaluation: 5/14/2024    Procedure Summary       Date: 05/14/24 Room / Location: [address]    Anesthesia Start: 0725 Anesthesia Stop: 0819    Procedure: PORT A CATHETER REMOVAL (Chest) Diagnosis:       Personal history of breast cancer      (Personal history of breast cancer [Z85.3])    Surgeons: Kanika Patel MD Responsible Provider: Duane Littlejohn MD    Anesthesia Type: MAC ASA Status: 3            Anesthesia Type: MAC    Najma Phase I: Najma Score: 10    Najma Phase II: Najma Score: 10    Anesthesia Post Evaluation    Comments: Postoperative Anesthesia Note    Name:    Marcela Arteaga  MRN:      [id number]    Patient Vitals in the past 12 hrs:  05/14/24 0831, BP:125/62, Pulse:76, Resp:20, SpO2:96 %  05/14/24 0826, BP:119/72, Pulse:78, Resp:18, SpO2:95 %  05/14/24 0815, BP:121/76, Temp:97.2 °F (36.2 °C), Pulse:76, Resp:16, SpO2:96 %  05/14/24 0615, BP:(!) 144/79, Temp:97.1 °F (36.2 °C), Temp src:Temporal, Pulse:88, Resp:20, SpO2:96 %, Height:1.575 m (5' 2\"), Weight:127 kg (280 lb)     LABS:    CBC  Lab Results       Component                Value               Date/Time                  WBC                      8.7                 06/30/2021 08:47 AM        HGB                      12.5                06/30/2021 08:47 AM        HCT                      38.5                06/30/2021 08:47 AM        PLT                      388                 06/30/2021 08:47 AM   RENAL  Lab Results       Component                Value               Date/Time                  NA                       138                 04/11/2024 04:04 PM        K                        4.4                 04/11/2024 04:04 PM        CL                       99                  04/11/2024 04:04 PM        CO2                      24                  04/11/2024 04:04 PM        BUN

## 2024-05-14 NOTE — PROGRESS NOTES
Patient awake getting dressed, no complaints of pain. Spoke with dad on phone with discharge education.

## 2024-05-14 NOTE — OP NOTE
Operative Note    Marcela Arteaga  YOB: 1975  MRN: [id number]    PREOPERATIVE DIAGNOSIS  History of breast cancer     POSTOPERATIVE DIAGNOSIS  History of breast cancer    PROCEDURE  Removal of right internal jugular vein Port-A-Catheter     ANESTHESIA  Monitored anesthesia care with local anesthetic    SURGEON  Kanika Patel MD     ASSISTANT  Brooks Milan    ESTIMATED BLOOD LOSS  less than 20 ml    COMPLICATIONS  None apparent by completion of procedure    SPECIMENS   None    FINDINGS  The patient had a right internal jugular vein Port-A-Catheter which was removed intact and then discarded.     POST-OP CONDITION  Stable    DISPOSITION  To recovery room    INDICATION FOR PROCEDURE  Marcela Arteaga is a 48 y.o. woman who has previously undergone treatment for breast cancer.  She is no longer in need of her port-a-catheter and presents today to have it removed.  The indications for the planned procedure, along with the potential benefits and risks which include but are not limited to the risk of anesthesia, bleeding, infection, and wound complications were reviewed. All questions were answered and she agrees to proceed.    DESCRIPTION OF PROCEDURE   Marcela Arteaga was brought to the operating room and placed supine on the operating room table with her arms tucked.  She was appropriately positioned and padded. Bilateral sequential compression devices were applied to both lower extremities and a single dose of antibiotics was administered intravenously within 60 minutes prior to the incision time.  After induction of anesthesia, a timeout procedure was performed. The patient was prepped and draped in the standard surgical fashion.  The right infraclavicular incision was identified and was infiltrated with a mixture of 1% plain lidocaine and 0.5% plain Marcaine to create a field block.  The previous incision was excised and carried down through the dermis with electrocautery.  Blunt and sharp

## 2024-05-15 DIAGNOSIS — E78.5 DM TYPE 2 WITH DIABETIC DYSLIPIDEMIA (HCC): ICD-10-CM

## 2024-05-15 DIAGNOSIS — E11.69 DM TYPE 2 WITH DIABETIC DYSLIPIDEMIA (HCC): ICD-10-CM

## 2024-05-15 RX ORDER — ASPIRIN 81 MG/1
81 TABLET ORAL DAILY
Qty: 100 TABLET | Refills: 3 | Status: SHIPPED | OUTPATIENT
Start: 2024-05-15

## 2024-05-15 NOTE — TELEPHONE ENCOUNTER
Medication:   Requested Prescriptions     Pending Prescriptions Disp Refills    aspirin (ASPIRIN LOW DOSE) 81 MG EC tablet 100 tablet 3     Sig: Take 1 tablet by mouth daily       Last Filled:      Patient Phone Number: [phone] (home) [phone] (work)    Last appt: 3/19/2024   Next appt: 8/20/2024    Last Labs DM:   Lab Results   Component Value Date/Time    LABA1C 6.0 03/19/2024 04:29 PM     Last Lipid:   Lab Results   Component Value Date/Time    CHOL 138 09/19/2023 06:31 AM    TRIG 418 09/19/2023 06:31 AM    HDL 28 09/19/2023 06:31 AM    HDL 31 02/27/2012 06:44 PM     Last PSA: No results found for: \"PSA\"  Last Thyroid:   Lab Results   Component Value Date/Time    TSH 3.94 02/27/2012 06:44 PM

## 2024-05-31 DIAGNOSIS — E11.9 INSULIN DEPENDENT TYPE 2 DIABETES MELLITUS, CONTROLLED (HCC): ICD-10-CM

## 2024-05-31 DIAGNOSIS — Z79.4 INSULIN DEPENDENT TYPE 2 DIABETES MELLITUS, CONTROLLED (HCC): ICD-10-CM

## 2024-05-31 RX ORDER — METFORMIN HYDROCHLORIDE 500 MG/1
500 TABLET, EXTENDED RELEASE ORAL 2 TIMES DAILY
Qty: 180 TABLET | Refills: 3 | Status: SHIPPED | OUTPATIENT
Start: 2024-05-31

## 2024-06-24 ENCOUNTER — OFFICE VISIT (OUTPATIENT)
Dept: ORTHOPEDIC SURGERY | Age: 49
End: 2024-06-24
Payer: COMMERCIAL

## 2024-06-24 VITALS — HEIGHT: 62 IN | WEIGHT: 280 LBS | BODY MASS INDEX: 51.53 KG/M2

## 2024-06-24 DIAGNOSIS — M25.511 ACUTE PAIN OF RIGHT SHOULDER: Primary | ICD-10-CM

## 2024-06-24 PROCEDURE — 99203 OFFICE O/P NEW LOW 30 MIN: CPT | Performed by: PHYSICIAN ASSISTANT

## 2024-06-24 RX ORDER — METHYLPREDNISOLONE 4 MG/1
TABLET ORAL
Qty: 1 KIT | Refills: 0 | Status: SHIPPED | OUTPATIENT
Start: 2024-06-24 | End: 2024-06-30

## 2024-06-24 NOTE — PROGRESS NOTES
BEFORE MEALS AS DIRECTED.  UNITS DAILY (Patient taking differently: Inject into the skin INJECT 100-120 UNITS UNDER THE SKIN BEFORE MEALS AS DIRECTED.  UNITS DAILY) 90 mL 2    loratadine (CLARITIN) 10 MG tablet Take 1 tablet by mouth daily       No current facility-administered medications on file prior to visit.         Objective:   Height 1.575 m (5' 2\"), weight 127 kg (280 lb), not currently breastfeeding.    On examination is a very pleasant 48-year-old female who is alert and oriented x 3 she has good symmetric motion through the neck and negative Spurling's test.  She has pain with crossover and impingement she even has a lot of soreness with a speeds test soreness with both biceps and rotator cuff tendon on examination and strength testing.  She has good  strength good wrist extension strength.  Neuro exam grossly intact both lower extremities. Intact sensation to light touch. Motor exam 4+ to 5/5 in all major motor groups.  Negative Acharya's sign.    Skin is warm, dry and intact with out erythema or significant increased temperature around the knee joint(s).     There are no cutaneous lesions or lymphadenopathy present.    X-RAYS:  X-rays taken at the office today show some degenerative changes at the tuberosity including an avulsion fracture that does not appear to be acute.  There is a downward sloping of the acromion but no superior migration of the humeral head no fractures or bone abnormalities otherwise      Assessment:  Right shoulder rotator cuff tendinitis mild impingement syndrome and also biceps tendinitis    Plan:  During today's visit, there was approximately 30 minutes of face-to-face discussion in regards to the patient's current condition and treatment options. More than 50 % of the time was counseling and coordination of care as indicated above.  We discussed short and long-term options and first and foremost we need to get information out of the shoulder instead of a

## 2024-06-25 NOTE — TELEPHONE ENCOUNTER
Refill Request       Last Seen: Last Seen Department: 4/11/2024  Last Seen by PCP: 4/11/2024    Last Written: 11/6/23 180 with 1    Next Appointment:   Future Appointments   Date Time Provider Department Center   8/20/2024  1:00 PM [facility] EG STEPHEN M[facility]MO [facility] EG  [facility]   8/20/2024  4:00 PM Manuel Penn MD Kenwo Endo [facility]   10/15/2024 11:00 AM Juana Tracy MD [facility] AND RES [facility]   2/18/2025  7:20 AM [facility]       Requested Prescriptions     Pending Prescriptions Disp Refills    magnesium oxide (MAG-OX) 400 (240 Mg) MG tablet 180 tablet 1     Sig: Take 1 tablet by mouth 2 times daily

## 2024-06-27 RX ORDER — LANOLIN ALCOHOL/MO/W.PET/CERES
400 CREAM (GRAM) TOPICAL 2 TIMES DAILY
Qty: 180 TABLET | Refills: 1 | Status: SHIPPED | OUTPATIENT
Start: 2024-06-27

## 2024-07-15 DIAGNOSIS — Z79.4 INSULIN DEPENDENT TYPE 2 DIABETES MELLITUS, CONTROLLED (HCC): Primary | ICD-10-CM

## 2024-07-15 DIAGNOSIS — E11.9 INSULIN DEPENDENT TYPE 2 DIABETES MELLITUS, CONTROLLED (HCC): Primary | ICD-10-CM

## 2024-07-15 NOTE — TELEPHONE ENCOUNTER
Medication:   Requested Prescriptions     Pending Prescriptions Disp Refills    empagliflozin (JARDIANCE) 25 MG tablet 90 tablet 1     Sig: TAKE 1 TABLET BY MOUTH ONE TIME A DAY       Last Filled:      Patient Phone Number: [phone] (home) [phone] (work)    Last appt: 3/19/2024   Next appt: 8/20/2024    Last Labs DM:   Lab Results   Component Value Date/Time    LABA1C 6.0 03/19/2024 04:29 PM

## 2024-07-18 DIAGNOSIS — Z79.4 INSULIN DEPENDENT TYPE 2 DIABETES MELLITUS, CONTROLLED (HCC): ICD-10-CM

## 2024-07-18 DIAGNOSIS — E11.9 INSULIN DEPENDENT TYPE 2 DIABETES MELLITUS, CONTROLLED (HCC): ICD-10-CM

## 2024-07-18 RX ORDER — INSULIN HUMAN 500 [IU]/ML
INJECTION, SOLUTION SUBCUTANEOUS
Qty: 90 ML | Refills: 2 | Status: SHIPPED | OUTPATIENT
Start: 2024-07-18

## 2024-07-18 NOTE — TELEPHONE ENCOUNTER
Medication:   Requested Prescriptions     Signed Prescriptions Disp Refills    insulin regular human (HUMULIN R U-500 KWIKPEN) 500 UNIT/ML SOPN concentrated injection pen 90 mL 2     Sig: INJECT 100-120 UNITS UNDER THE SKIN BEFORE MEALS AS DIRECTED.  UNITS DAILY     Authorizing Provider: ALFIE SHARP     Ordering User: TYSON MCBRIDE       Last Filled:      Patient Phone Number: [phone] (home) [phone] (work)    Last appt: 3/19/2024   Next appt: 8/20/2024    Last Labs DM:   Lab Results   Component Value Date/Time    LABA1C 6.0 03/19/2024 04:29 PM

## 2024-08-07 ENCOUNTER — CLINICAL DOCUMENTATION (OUTPATIENT)
Dept: PHARMACY | Facility: CLINIC | Age: 49
End: 2024-08-07

## 2024-08-07 NOTE — PROGRESS NOTES
2nd Quarterly Reminder sent to patient for the DM Program - See Mychart message or Letter for more information.      Taniya Salcido [facility]  Clinical Pharmacy   Phone: [phone], Option #3      For Pharmacy Admin Tracking Only    Program: [employer]  CPA in place:  No  Gap Closed?: Yes   Time Spent (min): 5

## 2024-08-19 LAB
CHOLEST SERPL-MCNC: 166 MG/DL (ref 0–199)
GLUCOSE SERPL-MCNC: 125 MG/DL (ref 70–99)
HDLC SERPL-MCNC: 29 MG/DL (ref 40–60)
LDLC SERPL CALC-MCNC: ABNORMAL MG/DL
LDLC SERPL-MCNC: 53 MG/DL
TRIGL SERPL-MCNC: 426 MG/DL (ref 0–150)

## 2024-08-20 ENCOUNTER — OFFICE VISIT (OUTPATIENT)
Dept: ENDOCRINOLOGY | Age: 49
End: 2024-08-20
Payer: COMMERCIAL

## 2024-08-20 ENCOUNTER — HOSPITAL ENCOUNTER (OUTPATIENT)
Dept: WOMENS IMAGING | Age: 49
Discharge: HOME | End: 2024-08-20
Payer: COMMERCIAL

## 2024-08-20 VITALS — BODY MASS INDEX: 52.81 KG/M2 | WEIGHT: 287 LBS | HEIGHT: 62 IN

## 2024-08-20 VITALS
DIASTOLIC BLOOD PRESSURE: 65 MMHG | TEMPERATURE: 98 F | WEIGHT: 288 LBS | SYSTOLIC BLOOD PRESSURE: 131 MMHG | HEIGHT: 62 IN | HEART RATE: 88 BPM | RESPIRATION RATE: 14 BRPM | BODY MASS INDEX: 53 KG/M2

## 2024-08-20 DIAGNOSIS — Z85.3 PERSONAL HISTORY OF MALIGNANT NEOPLASM OF BREAST: ICD-10-CM

## 2024-08-20 DIAGNOSIS — I10 PRIMARY HYPERTENSION: ICD-10-CM

## 2024-08-20 DIAGNOSIS — Z79.4 INSULIN DEPENDENT TYPE 2 DIABETES MELLITUS, CONTROLLED (HCC): Primary | ICD-10-CM

## 2024-08-20 DIAGNOSIS — E11.9 INSULIN DEPENDENT TYPE 2 DIABETES MELLITUS, CONTROLLED (HCC): Primary | ICD-10-CM

## 2024-08-20 LAB
EST. AVERAGE GLUCOSE BLD GHB EST-MCNC: 162.8 MG/DL
HBA1C MFR BLD: 7.3 %

## 2024-08-20 PROCEDURE — G0279 TOMOSYNTHESIS, MAMMO: HCPCS

## 2024-08-20 PROCEDURE — 99214 OFFICE O/P EST MOD 30 MIN: CPT | Performed by: INTERNAL MEDICINE

## 2024-08-20 PROCEDURE — 3051F HG A1C>EQUAL 7.0%<8.0%: CPT | Performed by: INTERNAL MEDICINE

## 2024-08-20 PROCEDURE — 77063 BREAST TOMOSYNTHESIS BI: CPT

## 2024-08-20 PROCEDURE — 3075F SYST BP GE 130 - 139MM HG: CPT | Performed by: INTERNAL MEDICINE

## 2024-08-20 PROCEDURE — 3078F DIAST BP <80 MM HG: CPT | Performed by: INTERNAL MEDICINE

## 2024-08-20 RX ORDER — TIRZEPATIDE 2.5 MG/.5ML
2.5 INJECTION, SOLUTION SUBCUTANEOUS WEEKLY
Qty: 2 ML | Refills: 0 | Status: CANCELLED | OUTPATIENT
Start: 2024-08-20

## 2024-08-20 NOTE — PROGRESS NOTES
Seen as f/u patient for diabetes      Interim:     Stable  No issues  Off mounjaro since 3/24  Couldn't get it    26 lb weight loss    Dx with breast cancer  went through chemotherapy XRT  Had steroids with it    Diagnosed with Type 2 diabetes mellitus 4-5 yrs ago  Known diabetic complications: None    Current diabetic medications   U-500 pen 110 TID       SSI 5 for 50 >150     Metformin ER  500mg BID  jardiance 25mg'  Mounjaro 10mg    Previous  lantus 110 units HS  Novolog 25 TID + SSI 5 for 50>150  Usually takes 35-40  januvia 100mg  Was on victoza in the past for 2 months  Bydureon    Moderate, uncontrolled    Insulin started 3/13    Intolerance to diabetes medications: yes - Metformin     Last A1c 7.3%<----6%<----6.3%<---6.9%<-----6.9%<--- 6.9%<---7.1%<---- 7.4%<-----7.5%<------ 6.9%<-----7%<------6.6%<------6.6%<----- 6.8%<-----6.3 on 3/17<----6.3 on 12/16<----6.1 on 3/15  <---- 8.5 On 12/13<---- 9.6<---- 8.9 on 5/13<---10.8<---10.9    Prior visit with dietician: Yes   Current diet: on average, 3 meals per day 45gm CHO  Current exercise: No exercise for one month  Current monitoring regimen: home blood tests occ , mainly in evening    Has brought blood glucose log/meter:yes  Home blood sugar records:106-225  Any episodes of hypoglycemia? occ    No Hx of CAD , PVD, CVA    Hyperlipidemia: pravastatin 40mg fenofibrate 145   on 10/14-switched to lipitor 80mg LDL 73 on 2/16  LDL 96  HDL 29 on 3/18     LDL 82 on 2/19  Tolerating , no aches  LDL 76 on 3/20  LDL 93 on 5/21       Last eye exam: 2023  Last foot exam:3/24  Last microalbumin to creatinine ratio: M/C nl on 5/19  ,   35 on 5/13 34 on 9/13  nl on 3/12---> 33.6 on 11/20----> 11/21 ---> 8/22--->11/23    HTN: Stable, tolerating Lisinopril 40mg HCTZ 25mg      Works from home    Review of Systems    Scanned, reviewed    Vitals:    08/20/24 1553   BP: 131/65   Pulse: 88   Resp: 14   Temp: 98 °F (36.7 °C)         Constitutional:

## 2024-08-29 DIAGNOSIS — K21.9 GASTROESOPHAGEAL REFLUX DISEASE WITHOUT ESOPHAGITIS: ICD-10-CM

## 2024-08-29 NOTE — TELEPHONE ENCOUNTER
Refill Request       Last Seen: Last Seen Department: 4/11/2024  Last Seen by PCP: 4/11/2024    Last Written: 2/19/24 180 with 1    Next Appointment:   Future Appointments   Date Time Provider Department Center   10/15/2024 11:00 AM Juana Tracy MD [facility] AND RES [facility]   11/25/2024  3:40 PM Manuel Penn MD Ken[facility] Endo [facility]   2/18/2025  7:20 AM [facility]   8/22/2025 11:00 AM [facility]     Requested Prescriptions     Pending Prescriptions Disp Refills    omeprazole (PRILOSEC) 20 MG delayed release capsule 180 capsule 1     Sig: Take 1 capsule by mouth 2 times daily

## 2024-09-12 RX ORDER — TIRZEPATIDE 5 MG/.5ML
INJECTION, SOLUTION SUBCUTANEOUS
Qty: 13 ADJUSTABLE DOSE PRE-FILLED PEN SYRINGE | Refills: 3 | Status: SHIPPED | OUTPATIENT
Start: 2024-09-12

## 2024-10-15 ENCOUNTER — HOSPITAL ENCOUNTER (OUTPATIENT)
Age: 49
Discharge: HOME | End: 2024-10-15
Payer: COMMERCIAL

## 2024-10-15 ENCOUNTER — OFFICE VISIT (OUTPATIENT)
Dept: PRIMARY CARE CLINIC | Age: 49
End: 2024-10-15
Payer: COMMERCIAL

## 2024-10-15 VITALS
BODY MASS INDEX: 51.49 KG/M2 | DIASTOLIC BLOOD PRESSURE: 68 MMHG | OXYGEN SATURATION: 96 % | SYSTOLIC BLOOD PRESSURE: 116 MMHG | HEART RATE: 76 BPM | WEIGHT: 281.6 LBS

## 2024-10-15 DIAGNOSIS — E11.9 INSULIN DEPENDENT TYPE 2 DIABETES MELLITUS, CONTROLLED (HCC): ICD-10-CM

## 2024-10-15 DIAGNOSIS — F41.1 GENERALIZED ANXIETY DISORDER: ICD-10-CM

## 2024-10-15 DIAGNOSIS — E78.49 OTHER HYPERLIPIDEMIA: ICD-10-CM

## 2024-10-15 DIAGNOSIS — Z79.4 INSULIN DEPENDENT TYPE 2 DIABETES MELLITUS, CONTROLLED (HCC): ICD-10-CM

## 2024-10-15 DIAGNOSIS — Z85.3 PERSONAL HISTORY OF BREAST CANCER: ICD-10-CM

## 2024-10-15 DIAGNOSIS — F51.01 PRIMARY INSOMNIA: ICD-10-CM

## 2024-10-15 DIAGNOSIS — G47.33 OSA ON CPAP: ICD-10-CM

## 2024-10-15 DIAGNOSIS — I10 PRIMARY HYPERTENSION: Primary | ICD-10-CM

## 2024-10-15 DIAGNOSIS — I10 PRIMARY HYPERTENSION: ICD-10-CM

## 2024-10-15 DIAGNOSIS — K21.9 GASTROESOPHAGEAL REFLUX DISEASE WITHOUT ESOPHAGITIS: ICD-10-CM

## 2024-10-15 DIAGNOSIS — F41.0 PANIC DISORDER WITHOUT AGORAPHOBIA: ICD-10-CM

## 2024-10-15 LAB
ALBUMIN SERPL-MCNC: 4.7 G/DL (ref 3.4–5)
ALBUMIN/GLOB SERPL: 1.4 {RATIO} (ref 1.1–2.2)
ALP SERPL-CCNC: 55 U/L (ref 40–129)
ALT SERPL-CCNC: 27 U/L (ref 10–40)
ANION GAP SERPL CALCULATED.3IONS-SCNC: 13 MMOL/L (ref 3–16)
AST SERPL-CCNC: 43 U/L (ref 15–37)
BILIRUB SERPL-MCNC: <0.2 MG/DL (ref 0–1)
BUN SERPL-MCNC: 20 MG/DL (ref 7–20)
CALCIUM SERPL-MCNC: 10 MG/DL (ref 8.3–10.6)
CHLORIDE SERPL-SCNC: 99 MMOL/L (ref 99–110)
CO2 SERPL-SCNC: 26 MMOL/L (ref 21–32)
CREAT SERPL-MCNC: 0.8 MG/DL (ref 0.6–1.1)
GFR SERPLBLD CREATININE-BSD FMLA CKD-EPI: 89 ML/MIN/{1.73_M2}
GLUCOSE SERPL-MCNC: 100 MG/DL (ref 70–99)
POTASSIUM SERPL-SCNC: 3.9 MMOL/L (ref 3.5–5.1)
PROT SERPL-MCNC: 8 G/DL (ref 6.4–8.2)
SODIUM SERPL-SCNC: 138 MMOL/L (ref 136–145)

## 2024-10-15 PROCEDURE — 90471 IMMUNIZATION ADMIN: CPT | Performed by: FAMILY MEDICINE

## 2024-10-15 PROCEDURE — 3051F HG A1C>EQUAL 7.0%<8.0%: CPT | Performed by: STUDENT IN AN ORGANIZED HEALTH CARE EDUCATION/TRAINING PROGRAM

## 2024-10-15 PROCEDURE — 90661 CCIIV3 VAC ABX FR 0.5 ML IM: CPT | Performed by: FAMILY MEDICINE

## 2024-10-15 PROCEDURE — 99214 OFFICE O/P EST MOD 30 MIN: CPT | Performed by: STUDENT IN AN ORGANIZED HEALTH CARE EDUCATION/TRAINING PROGRAM

## 2024-10-15 PROCEDURE — 3074F SYST BP LT 130 MM HG: CPT | Performed by: STUDENT IN AN ORGANIZED HEALTH CARE EDUCATION/TRAINING PROGRAM

## 2024-10-15 PROCEDURE — 36415 COLL VENOUS BLD VENIPUNCTURE: CPT

## 2024-10-15 PROCEDURE — 80053 COMPREHEN METABOLIC PANEL: CPT

## 2024-10-15 PROCEDURE — 3078F DIAST BP <80 MM HG: CPT | Performed by: STUDENT IN AN ORGANIZED HEALTH CARE EDUCATION/TRAINING PROGRAM

## 2024-10-15 RX ORDER — ROSUVASTATIN CALCIUM 40 MG/1
40 TABLET, COATED ORAL DAILY
Qty: 90 TABLET | Refills: 1 | Status: SHIPPED | OUTPATIENT
Start: 2024-10-15

## 2024-10-15 ASSESSMENT — ENCOUNTER SYMPTOMS
WHEEZING: 0
CHEST TIGHTNESS: 0
BACK PAIN: 0
VOMITING: 0
NAUSEA: 0
DIARRHEA: 0
CONSTIPATION: 0
SHORTNESS OF BREATH: 0
COUGH: 0
ABDOMINAL PAIN: 0

## 2024-10-15 NOTE — PROGRESS NOTES
116/68 today's visit, heart rate 76 bpm.  Continue with lisinopril-hydrochlorothiazide 20-25 mg daily.  We will obtain repeat CMP today    Hyperlipidemia/Elevated LFTs   Not controlled, not at goal.  Continue with rosuvastatin 40 mg daily and fenofibrate 145 mg daily at this time.  Patient recently completed a Be Well screening back in August 2024 which showed improving triglycerides (426, still). Patient significantly elevated triglycerides likely in the setting of diabetes requiring insulin usage.  Continue to encourage healthy dietary options and moderate intensity activity.    Repeat CMP today.   May need to consider additional therapy at next office visit for hypertriglyceridemia     Type 2 DM  Not controlled, not at goal. Last Hemoglobin A1c in August 2024 was 7.3%, worsened from 6.0% in March 2024.   Recommend the patient follow-up routinely with endocrinology as scheduled in November/December.  Patient can continue with medication regimen as directed by endocrinology, including, metformin 500 mg XR BID, Jardiance 25 mg daily, Mounjaro 5 mg sub-q weekly, insulin regular 100-120 units TID.  Advised patient to continue checking TID glucose levels.     SUNDAY/BMI > 50   Controlled, clinically stable.   Continue with CPAP nightly.  Continue routine follow-up with sleep medicine/pulmonology.  Continue with dietary/exercise modifications as described above.    Insomnia/RLS  Controlled, clinically stable   Relatively unchanged since previous office with me despite not being on medication.  Continue with OTC Tylenol for symptomatic pain relief.    GERD  Controlled, at goal.  Continue with omeprazole 20 mg daily and magnesium oxide.  Repeat Mg level normal in April 2024.     Hx of Left Breast Cancer s/p Left Mastectomy with Chemo/Radiation  Controlled, at goal.  Continue routine follow-up with breast surgery.   Last Mammogram in August 2024.    Anxiety with Phobia of Flying   Not controlled, not at goal.  May need to

## 2024-10-30 ENCOUNTER — PATIENT MESSAGE (OUTPATIENT)
Dept: PRIMARY CARE CLINIC | Age: 49
End: 2024-10-30

## 2024-10-30 ENCOUNTER — CLINICAL DOCUMENTATION (OUTPATIENT)
Dept: PHARMACY | Facility: CLINIC | Age: 49
End: 2024-10-30

## 2024-10-30 DIAGNOSIS — E11.9 INSULIN DEPENDENT TYPE 2 DIABETES MELLITUS, CONTROLLED (HCC): Primary | ICD-10-CM

## 2024-10-30 DIAGNOSIS — Z79.4 INSULIN DEPENDENT TYPE 2 DIABETES MELLITUS, CONTROLLED (HCC): Primary | ICD-10-CM

## 2024-10-30 NOTE — PROGRESS NOTES
[facility] Employee Diabetes Program - Be Well With Diabetes    Quarterly Check-in  Quarterly Reminder sent to patient for the DM Program - See Mycrobert message or Letter for more information  Sent Rei Lockwood [facility] Clinical   [facility] Clinical Pharmacy  Department, toll free: [phone], option 3      For Pharmacy Admin Tracking Only    Program: [employer]  CPA in place:  No  Gap Closed?: No   Time Spent (min): 5       =======================================================    Mychart/Letter for participant:    Thanks so much for taking the first step towards better health.    Please review the information below for requirements that need to be completed for the remainder of the year.    To ensure participants are taking steps to control their condition ongoing requirements need to be completed. To receive the Be Well With Diabetes Program benefit for 2025, the following actions must be taken:     Requirements to be completed by 12/31/24  Urine Microalbumin (once yearly)      *Documentation of any requirements completed or reviewed outside of the [facility] electronic medical record will need to be faxed or emailed (fax/email info below).*    If the missing requirements(s) are not met by the date listed above, you will be disqualified from the program and will not receive program benefits in 2025. If any patient is dis-enrolled from the program then they must wait a full calendar year to re-enroll in the program.   Please reach out to our team ASAP if there are any questions or concerns.      [facility]   Phone: [phone] Option #3  Email: [email or web address]  Fax Number: [phone]

## 2024-11-06 DIAGNOSIS — E78.49 OTHER HYPERLIPIDEMIA: ICD-10-CM

## 2024-11-06 RX ORDER — LISINOPRIL AND HYDROCHLOROTHIAZIDE 20; 25 MG/1; MG/1
1 TABLET ORAL DAILY
Qty: 90 TABLET | Refills: 1 | Status: SHIPPED | OUTPATIENT
Start: 2024-11-06

## 2024-11-06 RX ORDER — FENOFIBRATE 145 MG/1
145 TABLET, COATED ORAL DAILY
Qty: 90 TABLET | Refills: 1 | Status: SHIPPED | OUTPATIENT
Start: 2024-11-06

## 2024-11-06 NOTE — TELEPHONE ENCOUNTER
Refill Request       Last Seen: Last Seen Department: 10/15/2024  Last Seen by PCP: 4/11/2024    Last Written:   lisinopril-hydroCHLOROthiazide (PRINZIDE;ZESTORETIC) 20-25 MG per tablet 4/11/24 90 with 1    fenofibrate (TRICOR) 145 MG tablet 4/11/24 90 with 1    Next Appointment:   Future Appointments   Date Time Provider Department Center   11/25/2024  3:40 PM Manuel Penn MD Kenwo Endo [facility]   2/18/2025  7:20 AM Sotero Delgado MD  PULM [facility]   4/18/2025  9:30 AM Juana Tracy MD MHCX AND RES [facility]   8/22/2025 11:00 AM MHCZ EG WC MAMMO MHCZ EG STEPHEN [facility]       Future appointment scheduled      Requested Prescriptions     Pending Prescriptions Disp Refills    lisinopril-hydroCHLOROthiazide (PRINZIDE;ZESTORETIC) 20-25 MG per tablet 90 tablet 1     Sig: Take 1 tablet by mouth daily    fenofibrate (TRICOR) 145 MG tablet 90 tablet 1     Sig: Take 1 tablet by mouth daily

## 2024-11-08 DIAGNOSIS — Z79.4 INSULIN DEPENDENT TYPE 2 DIABETES MELLITUS, CONTROLLED (HCC): ICD-10-CM

## 2024-11-08 DIAGNOSIS — E11.9 INSULIN DEPENDENT TYPE 2 DIABETES MELLITUS, CONTROLLED (HCC): ICD-10-CM

## 2024-11-08 LAB
CREAT UR-MCNC: 87.3 MG/DL (ref 28–259)
MICROALBUMIN UR DL<=1MG/L-MCNC: 1.38 MG/DL
MICROALBUMIN/CREAT UR: 15.8 MG/G (ref 0–30)

## 2024-11-12 ENCOUNTER — PATIENT MESSAGE (OUTPATIENT)
Dept: PHARMACY | Facility: CLINIC | Age: 49
End: 2024-11-12

## 2024-11-19 NOTE — TELEPHONE ENCOUNTER
Wyatt message not read by patient.  Information mailed to patient in a letter.    No further patient outreach planned at this time.    Maryan Rossi CphT   [facility] Clinical   [facility] Clinical Pharmacy  Department, toll free: [phone], option 3    For Pharmacy Admin Tracking Only    Program: [employer]  CPA in place:  No  Gap Closed?: Yes   Time Spent (min): 5

## 2024-11-25 ENCOUNTER — OFFICE VISIT (OUTPATIENT)
Dept: ENDOCRINOLOGY | Age: 49
End: 2024-11-25
Payer: COMMERCIAL

## 2024-11-25 VITALS
WEIGHT: 283.8 LBS | RESPIRATION RATE: 16 BRPM | BODY MASS INDEX: 51.89 KG/M2 | OXYGEN SATURATION: 97 % | DIASTOLIC BLOOD PRESSURE: 77 MMHG | SYSTOLIC BLOOD PRESSURE: 120 MMHG | HEART RATE: 87 BPM

## 2024-11-25 DIAGNOSIS — Z79.4 INSULIN DEPENDENT TYPE 2 DIABETES MELLITUS, CONTROLLED (HCC): Primary | ICD-10-CM

## 2024-11-25 DIAGNOSIS — I10 PRIMARY HYPERTENSION: ICD-10-CM

## 2024-11-25 DIAGNOSIS — E11.9 INSULIN DEPENDENT TYPE 2 DIABETES MELLITUS, CONTROLLED (HCC): Primary | ICD-10-CM

## 2024-11-25 LAB — HBA1C MFR BLD: 6.8 %

## 2024-11-25 PROCEDURE — 3044F HG A1C LEVEL LT 7.0%: CPT | Performed by: INTERNAL MEDICINE

## 2024-11-25 PROCEDURE — 83036 HEMOGLOBIN GLYCOSYLATED A1C: CPT | Performed by: INTERNAL MEDICINE

## 2024-11-25 PROCEDURE — 3078F DIAST BP <80 MM HG: CPT | Performed by: INTERNAL MEDICINE

## 2024-11-25 PROCEDURE — 3074F SYST BP LT 130 MM HG: CPT | Performed by: INTERNAL MEDICINE

## 2024-11-25 PROCEDURE — 99214 OFFICE O/P EST MOD 30 MIN: CPT | Performed by: INTERNAL MEDICINE

## 2024-11-25 RX ORDER — OMEGA-3-ACID ETHYL ESTERS 1 G/1
2 CAPSULE, LIQUID FILLED ORAL 2 TIMES DAILY
Qty: 360 CAPSULE | Refills: 3 | Status: SHIPPED | OUTPATIENT
Start: 2024-11-25 | End: 2025-11-25

## 2024-11-25 RX ORDER — TIRZEPATIDE 7.5 MG/.5ML
INJECTION, SOLUTION SUBCUTANEOUS
Qty: 6 ML | Refills: 3 | Status: SHIPPED | OUTPATIENT
Start: 2024-11-25

## 2024-11-25 NOTE — PROGRESS NOTES
Seen as f/u patient for diabetes      Interim:     Stable  No issues    26 lb weight loss    Dx with breast cancer  went through chemotherapy XRT  Had steroids with it    Diagnosed with Type 2 diabetes mellitus 4-5 yrs ago  Known diabetic complications: None    Current diabetic medications   U-500 pen 110 TID       SSI 5 for 50 >150     Metformin ER  500mg BID  jardiance 25mg'  Mounjaro 5mg    Previous  lantus 110 units HS  Novolog 25 TID + SSI 5 for 50>150  Usually takes 35-40  januvia 100mg  Was on victoza in the past for 2 months  Bydureon    Moderate, uncontrolled    Insulin started 3/13    Intolerance to diabetes medications: yes - Metformin     Last A1c 6.8%<----- 7.3%<----6%<----6.3%<---6.9%<-----6.9%<--- 6.9%<---7.1%<---- 7.4%<-----7.5%<------ 6.9%<-----7%<------6.6%<------6.6%<----- 6.8%<-----6.3 on 3/17<----6.3 on 12/16<----6.1 on 3/15  <---- 8.5 On 12/13<---- 9.6<---- 8.9 on 5/13<---10.8<---10.9    Prior visit with dietician: Yes   Current diet: on average, 3 meals per day 45gm CHO  Current exercise: No exercise for one month  Current monitoring regimen: home blood tests occ , mainly in evening    Has brought blood glucose log/meter:yes  Home blood sugar records:109-136  Any episodes of hypoglycemia? occ    No Hx of CAD , PVD, CVA    Hyperlipidemia: pravastatin 40mg fenofibrate 145   on 10/14-switched to lipitor 80mg LDL 73 on 2/16  LDL 96  HDL 29 on 3/18     LDL 82 on 2/19  Tolerating , no aches  LDL 76 on 3/20  LDL 93 on 5/21     LDL 53    on crestor and fish oil    Last eye exam: 2023  Last foot exam:3/24  Last microalbumin to creatinine ratio: M/C nl on 5/19  ,   35 on 5/13 34 on 9/13  nl on 3/12---> 33.6 on 11/20----> 11/21 ---> 8/22--->11/24    HTN: Stable, tolerating Lisinopril 40mg HCTZ 25mg      Works from home    Review of Systems    Scanned, reviewed    Vitals:    11/25/24 1541   BP: 120/77   Pulse: 87   Resp: 16   SpO2: 97%           Constitutional:  Tele monitoring  C/w home atenolol

## 2024-12-10 NOTE — PROGRESS NOTES
07/21/21     CHIEF COMPLAINT:  Post-operative cellulitis     HISTORY OF PRESENT ILLNESS:  Marino Fry is a 50 y.o. woman who is status-post left partial mastectomy and sentinel lymph node biopsy as well as right port-a-catheter placement on 7/6/2021 for left breast cancer. She has developed cellulitis around the left breast incision and presents today for a recheck of this area. She has been taking oral Bactrim twice a day as prescribed and has noted the redness has decreased around the incision. She reports a small amount of drainage from the incision which is also decreasing in amount. She has no other systemic complaints and has not had any fevers. PHYSICAL EXAMINATION:     BREAST EXAMINATION: On examination, the right port and left axillary incisions are clean, dry, and intact and healing well. There is no sign of any infection or drainage from the wounds. The left breast incision has a very small amount of serous drainage from the medial aspect of the wound with significant decrease in the amount of surrounding erythema. At this time there is only erythema within about 1-2 mm of the incision. IMPRESSION/RECOMMENDATION:    Cancer Staging  Malignant neoplasm of overlapping sites of left breast in female, estrogen receptor negative ([facility] [address]  Staging form: Breast, AJCC 8th Edition  - Clinical stage from 6/9/2021: Stage IB (cT1c, cN0, cM0, G3, ER-, KS-, HER2-) - Signed by Jordi Bradley MD on 6/9/2021  - Pathologic stage from 7/14/2021: Stage IIA (pT2, pN0(sn), cM0, G3, ER-, KS-, HER2-) - Signed by Jordi Bradley MD on 7/14/2021      Marino Fry is a 50 y.o. woman who is now status-post left partial mastectomy and sentinel lymph node biopsy for a left breast cancer. She has developed cellulitis of the left breast.  I have refilled her prescription for Bactrim and have instructed her to continue taking this. I would like to see her back next week for a re-check of this area.   She knows to call sooner if any of her symptoms worsen. I encouraged her to contact me in the interim if any new questions or concerns arise. She has seen medical oncology and plans to start chemotherapy in 2 weeks. She knows that this infection must be resolved before starting chemotherapy. She has an appointment to see radiation oncology next week. Otherwise, I will plan to see her for routine follow up in 6 months. In the interim, I encouraged her to resume self examinations on a monthly basis and to alert me of any changes. I answered all of her questions thoroughly, and she does seem pleased with this plan of approach. I encouraged her to contact me in the interim if any new questions or concerns arise. Summary:  - follow up next week for a re-check of this area.     - continue Bactrim  - f/u in 6 months for clinical exam  - she will be due for bilateral mammogams on 5/26/2022    Carolyne Mathis MD recovery on L&D

## 2025-01-03 ENCOUNTER — PATIENT MESSAGE (OUTPATIENT)
Dept: PRIMARY CARE CLINIC | Age: 50
End: 2025-01-03

## 2025-01-03 DIAGNOSIS — F40.243 FLYING PHOBIA: ICD-10-CM

## 2025-01-06 RX ORDER — DIAZEPAM 2 MG/1
TABLET ORAL
Qty: 5 TABLET | Refills: 0 | Status: SHIPPED | OUTPATIENT
Start: 2025-01-06 | End: 2025-01-27

## 2025-01-06 RX ORDER — MECLIZINE HYDROCHLORIDE 25 MG/1
25 TABLET ORAL 3 TIMES DAILY PRN
Qty: 25 TABLET | Refills: 0 | Status: SHIPPED | OUTPATIENT
Start: 2025-01-06 | End: 2025-01-16

## 2025-01-08 ENCOUNTER — TELEPHONE (OUTPATIENT)
Dept: PHARMACY | Facility: CLINIC | Age: 50
End: 2025-01-08

## 2025-01-08 NOTE — TELEPHONE ENCOUNTER
**Patient is [facility]**     2025 Annual Pharmacist Visit    Called patient to schedule 2025 yearly pharmacist appointment to discuss medications for Diabetes Management Program.    No answer. Left VM.     Please call back at [phone], option #3         Taniya Salcido [facility]  Clinical Pharmacy   Phone: [phone], option #3

## 2025-01-12 DIAGNOSIS — Z79.4 INSULIN DEPENDENT TYPE 2 DIABETES MELLITUS, CONTROLLED (HCC): ICD-10-CM

## 2025-01-12 DIAGNOSIS — E11.9 INSULIN DEPENDENT TYPE 2 DIABETES MELLITUS, CONTROLLED (HCC): ICD-10-CM

## 2025-01-13 RX ORDER — LANOLIN ALCOHOL/MO/W.PET/CERES
400 CREAM (GRAM) TOPICAL 2 TIMES DAILY
Qty: 180 TABLET | Refills: 1 | Status: SHIPPED | OUTPATIENT
Start: 2025-01-13

## 2025-01-13 RX ORDER — FLURBIPROFEN SODIUM 0.3 MG/ML
1 SOLUTION/ DROPS OPHTHALMIC 3 TIMES DAILY
Qty: 300 EACH | Refills: 1 | Status: SHIPPED | OUTPATIENT
Start: 2025-01-13

## 2025-01-13 NOTE — TELEPHONE ENCOUNTER
Refill Request     Last Seen: 10/15/2024    Last Written: 6/2024    Next Appointment:   Future Appointments   Date Time Provider Department Center   2/18/2025  7:20 AM Sotero Delgado MD  PULM [facility]   3/25/2025  4:00 PM Manuel Penn MD Kenwo Endo [facility]   4/18/2025  9:30 AM Juana Tracy MD MHCX AND RES [facility]   8/22/2025 11:00 AM ELIASZ EG STEPHEN HERRERA EG STEPHEN [facility]             Requested Prescriptions     Pending Prescriptions Disp Refills    magnesium oxide (MAG-OX) 400 (240 Mg) MG tablet 180 tablet 1     Sig: Take 1 tablet by mouth 2 times daily

## 2025-01-13 NOTE — TELEPHONE ENCOUNTER
Marcela called in to schedule 2025 yearly pharmacist appointment.    **Patient is [facility]**      2025 Annual Pharmacist Visit    Appointment scheduled for 1.14.25 @ 1:00 PM.    Nika Lugo CPhT  Population Health    [facility] Clinical Pharmacy   [phone] option 1     For Pharmacy Admin Tracking Only    Program: [employer]  CPA in place:  No  Recommendation Provided To: Patient/Caregiver: 1 via Telephone  Intervention Detail: Scheduled Appointment  Intervention Accepted By: Patient/Caregiver: 1  Gap Closed?: Yes   Time Spent (min): 5

## 2025-01-14 ENCOUNTER — TELEPHONE (OUTPATIENT)
Dept: PHARMACY | Facility: CLINIC | Age: 50
End: 2025-01-14

## 2025-01-14 NOTE — TELEPHONE ENCOUNTER
[facility] christopher updated.  Will sign off.    ROSANGELA Goodman, PharmD, BCACP  Ambulatory Care Clinical Pharmacist- [facility] Clinical Pharmacy  Department, toll free: [phone]    
currently using [facility] (home delivery or local site pharmacy) to get your prescriptions? Yes    Would you like to speak with a pharmacist about the program, your diabetes, and/or your prescriptions? No    Nakul Dixon [facility] Clinical Pharmacy Team  Phone: toll free [phone], option #3  Fax [phone]  Email: [email or web address]       For Pharmacy Admin Tracking Only    Program: [facility]  CPA in place:  No  Gap Closed?: Yes   Time Spent (min): 10

## 2025-02-18 ENCOUNTER — OFFICE VISIT (OUTPATIENT)
Dept: PULMONOLOGY | Age: 50
End: 2025-02-18
Payer: COMMERCIAL

## 2025-02-18 VITALS
OXYGEN SATURATION: 98 % | BODY MASS INDEX: 51.97 KG/M2 | RESPIRATION RATE: 18 BRPM | TEMPERATURE: 96.9 F | WEIGHT: 282.4 LBS | DIASTOLIC BLOOD PRESSURE: 70 MMHG | HEIGHT: 62 IN | SYSTOLIC BLOOD PRESSURE: 115 MMHG | HEART RATE: 72 BPM

## 2025-02-18 DIAGNOSIS — G47.33 OSA ON CPAP: ICD-10-CM

## 2025-02-18 PROCEDURE — 3078F DIAST BP <80 MM HG: CPT | Performed by: INTERNAL MEDICINE

## 2025-02-18 PROCEDURE — 99214 OFFICE O/P EST MOD 30 MIN: CPT | Performed by: INTERNAL MEDICINE

## 2025-02-18 PROCEDURE — 3074F SYST BP LT 130 MM HG: CPT | Performed by: INTERNAL MEDICINE

## 2025-02-18 ASSESSMENT — SLEEP AND FATIGUE QUESTIONNAIRES
HOW LIKELY ARE YOU TO NOD OFF OR FALL ASLEEP WHEN YOU ARE A PASSENGER IN A CAR FOR AN HOUR WITHOUT A BREAK: WOULD NEVER DOZE
HOW LIKELY ARE YOU TO NOD OFF OR FALL ASLEEP WHILE SITTING INACTIVE IN A PUBLIC PLACE: WOULD NEVER DOZE
HOW LIKELY ARE YOU TO NOD OFF OR FALL ASLEEP IN A CAR, WHILE STOPPED FOR A FEW MINUTES IN TRAFFIC: WOULD NEVER DOZE
HOW LIKELY ARE YOU TO NOD OFF OR FALL ASLEEP WHILE SITTING AND READING: HIGH CHANCE OF DOZING
HOW LIKELY ARE YOU TO NOD OFF OR FALL ASLEEP WHILE LYING DOWN TO REST IN THE AFTERNOON WHEN CIRCUMSTANCES PERMIT: HIGH CHANCE OF DOZING
HOW LIKELY ARE YOU TO NOD OFF OR FALL ASLEEP WHILE SITTING QUIETLY AFTER LUNCH WITHOUT ALCOHOL: WOULD NEVER DOZE
HOW LIKELY ARE YOU TO NOD OFF OR FALL ASLEEP WHILE WATCHING TV: WOULD NEVER DOZE
HOW LIKELY ARE YOU TO NOD OFF OR FALL ASLEEP WHILE SITTING AND TALKING TO SOMEONE: WOULD NEVER DOZE
ESS TOTAL SCORE: 6

## 2025-02-18 NOTE — PROGRESS NOTES
REASON FOR CONSULTATION/CC: SUNDAY      PCP: Juana Tracy MD    HISTORY OF PRESENT ILLNESS: Marcela Arteaga is a 49 y.o. year old female with a history of SUNDAY who presents :     Sleep history:      SUNDAY   Mask: full face mask.   Using cpap nightly > 4 hours per day.  No issues. No complaints mask fit or humidification issues.  Knows to changes mask and hoses every 6 months.    No issues with new device.       Obesity   Disucssed.   Slow worsening over time.            [place] Sleepiness Scale:        2/18/2025     7:22 AM 2/15/2024     7:24 AM 9/28/2023     7:15 AM 1/17/2019     8:27 AM 12/12/2017     8:07 AM 6/8/2016     7:05 AM 12/2/2015     6:00 AM   Sleep Medicine   Sitting and reading 3 3 2 2 2 3 3   Watching TV 0 1 1 1 0 1 1   Sitting, inactive in a public place (e.g. a theatre or a meeting) 0 0 0 0 0 0 0   As a passenger in a car for an hour without a break 0 0 0 1 2 2 2   Lying down to rest in the afternoon when circumstances permit 3 1 2 1 0 2 2   Sitting and talking to someone 0 0 0 0 0 0 0   Sitting quietly after a lunch without alcohol 0 0 0 0 0 0 0   In a car, while stopped for a few minutes in traffic 0 0 0 0 0 0 0   [place] Sleepiness Score 6 5 5 5 4 8 8   Neck (Inches)     17 17          0 = no chance of dozing  1 = slight chance of dozing  2 = moderate chance of dozing  3 = high chance of dozing    Interpretation:   0-7:It is unlikely that you are abnormally sleepy.   8-9:You have an average amount of daytime sleepiness.   10-15:You may be excessively sleepy depending on the situation. You may want to consider   seeking medical attention.   16-24:You are excessively sleepy and should consider seeking medical attention     Objective:   PHYSICAL EXAM:  Blood pressure 115/70, pulse 72, temperature 96.9 °F (36.1 °C), resp. rate 18, height 1.575 m (5' 2\"), weight 128.1 kg (282 lb 6.4 oz), SpO2 98%, not currently breastfeeding.'  Gen: No distress.  Obese Body mass index is 51.65 kg/m².   ENT:   Resp:

## 2025-02-18 NOTE — ASSESSMENT & PLAN NOTE
Titration is not required during this visit.    PAP download information:  Usage > 4 hours  100 %   Pressure setting  14.9 cwp    AHI with usage  0.2     See media tab for full download data.    Marcela Arteaga  is deriving benefit from PAP demonstrated by improved [place], AHI, symptoms.

## 2025-03-04 DIAGNOSIS — K21.9 GASTROESOPHAGEAL REFLUX DISEASE WITHOUT ESOPHAGITIS: ICD-10-CM

## 2025-03-04 RX ORDER — OMEPRAZOLE 20 MG/1
20 CAPSULE, DELAYED RELEASE ORAL 2 TIMES DAILY
Qty: 180 CAPSULE | Refills: 1 | Status: SHIPPED | OUTPATIENT
Start: 2025-03-04

## 2025-03-04 NOTE — TELEPHONE ENCOUNTER
Refill Request       Last Seen: Last Seen Department: 10/15/2024  Last Seen by PCP: 4/11/2024    Last Written: 8/30/24 180 with 1    Next Appointment:   Future Appointments   Date Time Provider Department Center   3/25/2025  4:00 PM Manuel Penn MD [facility]   4/18/2025  9:30 AM Juana Tracy MD [facility] DEP   8/22/2025 11:00 AM [facility] MAMMO [facility]   2/18/2026  7:20 AM Sotero Delgado MD [facility]       Future appointment scheduled      Requested Prescriptions     Pending Prescriptions Disp Refills    omeprazole (PRILOSEC) 20 MG delayed release capsule 180 capsule 1     Sig: Take 1 capsule by mouth 2 times daily

## 2025-03-04 NOTE — TELEPHONE ENCOUNTER
Patient currently taking medication twice daily not daily which is a correction from my prior office visit note.  Recent magnesium level obtained within the last year appropriate.  Refill sent to pharmacy.

## 2025-03-25 ENCOUNTER — OFFICE VISIT (OUTPATIENT)
Dept: ENDOCRINOLOGY | Age: 50
End: 2025-03-25
Payer: COMMERCIAL

## 2025-03-25 VITALS
BODY MASS INDEX: 51.51 KG/M2 | HEART RATE: 71 BPM | RESPIRATION RATE: 15 BRPM | OXYGEN SATURATION: 98 % | WEIGHT: 281.6 LBS | DIASTOLIC BLOOD PRESSURE: 66 MMHG | SYSTOLIC BLOOD PRESSURE: 143 MMHG

## 2025-03-25 DIAGNOSIS — E11.9 INSULIN DEPENDENT TYPE 2 DIABETES MELLITUS, CONTROLLED (HCC): Primary | ICD-10-CM

## 2025-03-25 DIAGNOSIS — Z79.4 INSULIN DEPENDENT TYPE 2 DIABETES MELLITUS, CONTROLLED (HCC): Primary | ICD-10-CM

## 2025-03-25 DIAGNOSIS — I10 PRIMARY HYPERTENSION: ICD-10-CM

## 2025-03-25 LAB — HBA1C MFR BLD: 6.1 %

## 2025-03-25 PROCEDURE — 3077F SYST BP >= 140 MM HG: CPT | Performed by: INTERNAL MEDICINE

## 2025-03-25 PROCEDURE — 83036 HEMOGLOBIN GLYCOSYLATED A1C: CPT | Performed by: INTERNAL MEDICINE

## 2025-03-25 PROCEDURE — 3078F DIAST BP <80 MM HG: CPT | Performed by: INTERNAL MEDICINE

## 2025-03-25 PROCEDURE — 99214 OFFICE O/P EST MOD 30 MIN: CPT | Performed by: INTERNAL MEDICINE

## 2025-03-25 NOTE — PROGRESS NOTES
Seen as f/u patient for diabetes      Interim:     Stable  No issues  No more weight loss    26 lb weight loss    Dx with breast cancer  went through chemotherapy XRT  Had steroids with it    Diagnosed with Type 2 diabetes mellitus 4-5 yrs ago  Known diabetic complications: None    Current diabetic medications   U-500 pen 110 TID       SSI 5 for 50 >150     Metformin ER  500mg BID  jardiance 25mg'  Mounjaro 7. 5mg    Previous  lantus 110 units HS  Novolog 25 TID + SSI 5 for 50>150  Usually takes 35-40  januvia 100mg  Was on victoza in the past for 2 months  Bydureon    Moderate, uncontrolled    Insulin started 3/13    Intolerance to diabetes medications: yes - Metformin     Last A1c 6.1%<--- 6.8%<----- 7.3%<----6%<----6.3%<---6.9%<-----6.9%<--- 6.9%<---7.1%<---- 7.4%<-----7.5%<------ 6.9%<-----7%<------6.6%<------6.6%<----- 6.8%<-----6.3 on 3/17<----6.3 on 12/16<----6.1 on 3/15  <---- 8.5 On 12/13<---- 9.6<---- 8.9 on 5/13<---10.8<---10.9    Prior visit with dietician: Yes   Current diet: on average, 3 meals per day 45gm CHO  Current exercise: No exercise for one month  Current monitoring regimen: home blood tests occ , mainly in evening    Has brought blood glucose log/meter:yes  Home blood sugar records:121-171  Any episodes of hypoglycemia? occ    No Hx of CAD , PVD, CVA    Hyperlipidemia: pravastatin 40mg fenofibrate 145   on 10/14-switched to lipitor 80mg LDL 73 on 2/16  LDL 96  HDL 29 on 3/18     LDL 82 on 2/19  Tolerating , no aches  LDL 76 on 3/20  LDL 93 on 5/21     LDL 53    on crestor and fish oil    Last eye exam: 2023  Last foot exam:3/24  Last microalbumin to creatinine ratio: M/C nl on 5/19  ,   35 on 5/13 34 on 9/13  nl on 3/12---> 33.6 on 11/20----> 11/21 ---> 8/22--->11/24    HTN: Stable, tolerating Lisinopril 40mg HCTZ 25mg      Works from home    Review of Systems    Scanned, reviewed    Vitals:    03/25/25 1549   BP: (!) 143/66   Pulse: 71   Resp: 15   SpO2: 98%

## 2025-03-28 ENCOUNTER — CLINICAL DOCUMENTATION (OUTPATIENT)
Dept: PHARMACY | Facility: CLINIC | Age: 50
End: 2025-03-28

## 2025-03-28 NOTE — PROGRESS NOTES
[facility] Employee Diabetes Program - Be Well With Diabetes    Quarterly Check-in  Quarterly Reminder sent to patient for the DM Program - See Rei message or Letter for more information  Sent Rei Rossi [facility]   Population Health Clinical   [facility] Clinical Pharmacy  Department, toll free: [phone], option 3    For Pharmacy Admin Tracking Only    Program: [employer]  CPA in place:  No  Gap Closed?: No   Time Spent (min): 5    =======================================================    Mychart/Letter for participant:    Thanks so much for taking the first step towards better health.    To ensure participants are taking steps to control their condition, ongoing requirements need to be completed. To receive the Be Well With Diabetes Program 2026, the following actions must be taken:     Requirements to be completed by 12/31/25  Visit with your physician (Second yearly visit)  Second A1C  Lipid panel (once yearly)  Urine Microalbumin (once yearly)  Flu vaccination (once yearly)  Taking a Statin, have a contraindication, or a Provider override  Taking an ACEi/ARB, have a contraindication, or a Provider override    Requirement due by 12/31/25 if A1C is greater than 8 percent:  Engage with a [facility] Associate Care Managers (ACM) or Clinical pharmacy specialists by phone at least 2 times, or complete some form of diabetes education through your provider, BeWell, etc.    *Documentation of any requirements completed or reviewed outside of the [facility] electronic medical record will need to be faxed or emailed (fax/email info below).*    **Note: If you complete a 2025 Be Well Within Screening you can have an A1C drawn at that time at no cost to you - Advise the Be Well Within Team at your screening that you are enrolled in the Be Well With Diabetes Program and you would like to have an A1C drawn with

## 2025-04-10 DIAGNOSIS — Z79.4 INSULIN DEPENDENT TYPE 2 DIABETES MELLITUS, CONTROLLED (HCC): ICD-10-CM

## 2025-04-10 DIAGNOSIS — E11.9 INSULIN DEPENDENT TYPE 2 DIABETES MELLITUS, CONTROLLED (HCC): ICD-10-CM

## 2025-04-10 RX ORDER — FLURBIPROFEN SODIUM 0.3 MG/ML
1 SOLUTION/ DROPS OPHTHALMIC 3 TIMES DAILY
Qty: 300 EACH | Refills: 1 | Status: SHIPPED | OUTPATIENT
Start: 2025-04-10

## 2025-04-10 NOTE — TELEPHONE ENCOUNTER
2 faxes from [facility] with refill requests for pen needles and Jardiance 25 mg.     Prescriptions e-scribed.

## 2025-04-11 ENCOUNTER — PATIENT MESSAGE (OUTPATIENT)
Dept: PRIMARY CARE CLINIC | Age: 50
End: 2025-04-11

## 2025-04-17 SDOH — ECONOMIC STABILITY: FOOD INSECURITY: WITHIN THE PAST 12 MONTHS, YOU WORRIED THAT YOUR FOOD WOULD RUN OUT BEFORE YOU GOT MONEY TO BUY MORE.: NEVER TRUE

## 2025-04-17 SDOH — ECONOMIC STABILITY: INCOME INSECURITY: IN THE LAST 12 MONTHS, WAS THERE A TIME WHEN YOU WERE NOT ABLE TO PAY THE MORTGAGE OR RENT ON TIME?: NO

## 2025-04-17 SDOH — ECONOMIC STABILITY: FOOD INSECURITY: WITHIN THE PAST 12 MONTHS, THE FOOD YOU BOUGHT JUST DIDN'T LAST AND YOU DIDN'T HAVE MONEY TO GET MORE.: NEVER TRUE

## 2025-04-17 SDOH — ECONOMIC STABILITY: TRANSPORTATION INSECURITY
IN THE PAST 12 MONTHS, HAS THE LACK OF TRANSPORTATION KEPT YOU FROM MEDICAL APPOINTMENTS OR FROM GETTING MEDICATIONS?: NO

## 2025-04-17 ASSESSMENT — PATIENT HEALTH QUESTIONNAIRE - PHQ9
1. LITTLE INTEREST OR PLEASURE IN DOING THINGS: NOT AT ALL
2. FEELING DOWN, DEPRESSED OR HOPELESS: NOT AT ALL
SUM OF ALL RESPONSES TO PHQ QUESTIONS 1-9: 0
SUM OF ALL RESPONSES TO PHQ9 QUESTIONS 1 & 2: 0
SUM OF ALL RESPONSES TO PHQ QUESTIONS 1-9: 0
2. FEELING DOWN, DEPRESSED OR HOPELESS: NOT AT ALL
1. LITTLE INTEREST OR PLEASURE IN DOING THINGS: NOT AT ALL

## 2025-04-18 ENCOUNTER — OFFICE VISIT (OUTPATIENT)
Dept: PRIMARY CARE CLINIC | Age: 50
End: 2025-04-18

## 2025-04-18 VITALS
HEART RATE: 75 BPM | WEIGHT: 277.6 LBS | HEIGHT: 62 IN | BODY MASS INDEX: 51.09 KG/M2 | DIASTOLIC BLOOD PRESSURE: 66 MMHG | OXYGEN SATURATION: 97 % | SYSTOLIC BLOOD PRESSURE: 118 MMHG

## 2025-04-18 DIAGNOSIS — E83.42 HYPOMAGNESEMIA: ICD-10-CM

## 2025-04-18 DIAGNOSIS — K21.9 GASTROESOPHAGEAL REFLUX DISEASE WITHOUT ESOPHAGITIS: ICD-10-CM

## 2025-04-18 DIAGNOSIS — E78.49 OTHER HYPERLIPIDEMIA: ICD-10-CM

## 2025-04-18 DIAGNOSIS — Z79.4 INSULIN DEPENDENT TYPE 2 DIABETES MELLITUS, CONTROLLED (HCC): ICD-10-CM

## 2025-04-18 DIAGNOSIS — Z11.4 SCREENING FOR HIV (HUMAN IMMUNODEFICIENCY VIRUS): ICD-10-CM

## 2025-04-18 DIAGNOSIS — I10 PRIMARY HYPERTENSION: ICD-10-CM

## 2025-04-18 DIAGNOSIS — E11.69 DM TYPE 2 WITH DIABETIC DYSLIPIDEMIA (HCC): ICD-10-CM

## 2025-04-18 DIAGNOSIS — E11.9 INSULIN DEPENDENT TYPE 2 DIABETES MELLITUS, CONTROLLED (HCC): ICD-10-CM

## 2025-04-18 DIAGNOSIS — Z11.59 ENCOUNTER FOR HEPATITIS C SCREENING TEST FOR LOW RISK PATIENT: ICD-10-CM

## 2025-04-18 DIAGNOSIS — E78.5 DM TYPE 2 WITH DIABETIC DYSLIPIDEMIA (HCC): ICD-10-CM

## 2025-04-18 DIAGNOSIS — F51.01 PRIMARY INSOMNIA: ICD-10-CM

## 2025-04-18 DIAGNOSIS — G47.33 OSA ON CPAP: ICD-10-CM

## 2025-04-18 DIAGNOSIS — Z17.1 MALIGNANT NEOPLASM OF OVERLAPPING SITES OF LEFT BREAST IN FEMALE, ESTROGEN RECEPTOR NEGATIVE: Primary | ICD-10-CM

## 2025-04-18 DIAGNOSIS — C50.812 MALIGNANT NEOPLASM OF OVERLAPPING SITES OF LEFT BREAST IN FEMALE, ESTROGEN RECEPTOR NEGATIVE: Primary | ICD-10-CM

## 2025-04-18 DIAGNOSIS — R87.810 PAPANICOLAOU SMEAR OF CERVIX WITH POSITIVE HIGH RISK HUMAN PAPILLOMA VIRUS (HPV) TEST: ICD-10-CM

## 2025-04-18 LAB
ALBUMIN SERPL-MCNC: 4.7 G/DL (ref 3.4–5)
ALBUMIN/GLOB SERPL: 1.5 {RATIO} (ref 1.1–2.2)
ALP SERPL-CCNC: 60 U/L (ref 40–129)
ALT SERPL-CCNC: 50 U/L (ref 10–40)
ANION GAP SERPL CALCULATED.3IONS-SCNC: 12 MMOL/L (ref 3–16)
AST SERPL-CCNC: 72 U/L (ref 15–37)
BILIRUB SERPL-MCNC: <0.2 MG/DL (ref 0–1)
BUN SERPL-MCNC: 22 MG/DL (ref 7–20)
CALCIUM SERPL-MCNC: 10.4 MG/DL (ref 8.3–10.6)
CHLORIDE SERPL-SCNC: 99 MMOL/L (ref 99–110)
CHOLEST SERPL-MCNC: 148 MG/DL (ref 0–199)
CO2 SERPL-SCNC: 26 MMOL/L (ref 21–32)
CREAT SERPL-MCNC: 0.7 MG/DL (ref 0.6–1.1)
GFR SERPLBLD CREATININE-BSD FMLA CKD-EPI: >90 ML/MIN/{1.73_M2}
GLUCOSE P FAST SERPL-MCNC: 137 MG/DL (ref 70–99)
GLUCOSE SERPL-MCNC: 137 MG/DL (ref 70–99)
HCV AB SERPL QL IA: NORMAL
HDLC SERPL-MCNC: 32 MG/DL (ref 40–60)
LDLC SERPL CALC-MCNC: ABNORMAL MG/DL
LDLC SERPL-MCNC: 53 MG/DL
MAGNESIUM SERPL-MCNC: 2.05 MG/DL (ref 1.8–2.4)
POTASSIUM SERPL-SCNC: 4.8 MMOL/L (ref 3.5–5.1)
PROT SERPL-MCNC: 7.8 G/DL (ref 6.4–8.2)
SODIUM SERPL-SCNC: 137 MMOL/L (ref 136–145)
TRIGL SERPL-MCNC: 457 MG/DL (ref 0–150)
VLDLC SERPL CALC-MCNC: ABNORMAL MG/DL

## 2025-04-18 SDOH — ECONOMIC STABILITY: FOOD INSECURITY: WITHIN THE PAST 12 MONTHS, YOU WORRIED THAT YOUR FOOD WOULD RUN OUT BEFORE YOU GOT MONEY TO BUY MORE.: NEVER TRUE

## 2025-04-18 SDOH — ECONOMIC STABILITY: FOOD INSECURITY: WITHIN THE PAST 12 MONTHS, THE FOOD YOU BOUGHT JUST DIDN'T LAST AND YOU DIDN'T HAVE MONEY TO GET MORE.: NEVER TRUE

## 2025-04-18 NOTE — PROGRESS NOTES
PROGRESS NOTE     Juana Tracy MD  [facility]                                  [address], [address], [facility] [address]         Phone: [phone]    Date of Service:  4/18/2025     Patient ID: Satish Arteaga is a 49 y.o. female      Assessment / Plan:       1. DM type 2 with diabetic dyslipidemia (HCC)  Recent A1c =6.0 on 3/19/24.  At goal.  Patient to continue Jardiance 25 mg, Mounjaro 10 mg weekly, metformin 1000 mg XR daily, and concentrated insulin    - insulin managed by endocrine, dr nathan. Pt states her U-500.       --pt on statin and ACEI     Patient states she is up-to-date with diabetic eye exam.      Checking CMP.    Patient declining diabetic foot exam.    Patient has lost some weight since restarting the Mounjaro.       2. Hyperlipidemia  LDL at goal.  Last TGs elevated, likely due to being off mounjaro.  Last HDL still low    Repeating FLP for her \"be well\" screen today.   Continue (tricor 145, crestor 40mg).         3. Hypertension  at goal is <130/80.. Continue meds. (HCTZ 25/lisinopril 20mg) . Checking CMP     4. SUNDAY on CPAP  Well controlled. Continue CPAP.          5. GERD (gastroesophageal reflux disease)/hypomagnesium  Well controlled, continue ppi.  Patient to continue Mag-Ox.  Checking Magnesium level.       6. Malignant neoplasm of overlapping sites of left breast in female, estrogen receptor negative (HCC)   status post unilateral mastectomy, and is s/p chemo and radiation.      Pt seeing heme/onc q 6 months. Seeing breast surgeon annually.    7. High risk HPV infection  Normal Pap but positive high risk HPV 10/4/2023.  She is aware she is overdue for repeat pap smear.  Referring to Dr. Zurita for repeat as she is requesting OB/Gyn for pap smear in case she needs colposcopy.  Explained the importance of this and she expressed understanding          HM:      Annual mammogram done 8/20/24: normal.  Was

## 2025-04-19 LAB
HIV 1+2 AB+HIV1 P24 AG SERPL QL IA: NORMAL
HIV 2 AB SERPL QL IA: NORMAL
HIV1 AB SERPL QL IA: NORMAL
HIV1 P24 AG SERPL QL IA: NORMAL

## 2025-04-21 ENCOUNTER — RESULTS FOLLOW-UP (OUTPATIENT)
Dept: PRIMARY CARE CLINIC | Age: 50
End: 2025-04-21

## 2025-05-01 RX ORDER — ROSUVASTATIN CALCIUM 40 MG/1
40 TABLET, COATED ORAL DAILY
Qty: 90 TABLET | Refills: 1 | Status: SHIPPED | OUTPATIENT
Start: 2025-05-01

## 2025-05-01 NOTE — TELEPHONE ENCOUNTER
Refill Request       Last Seen: Last Seen Department: 4/18/2025  Last Seen by PCP: 10/15/2024    Last Written: 10/15/24 90 1 refill    Next Appointment:   Future Appointments   Date Time Provider Department Center   5/27/2025  8:00 AM Jennifer Malin MD [facility] OB/GYN [facility]   7/25/2025  9:30 AM Juana Tracy MD [facility] DEP   7/28/2025  4:40 PM Manuel Penn MD [facility]   8/22/2025  9:30 AM [facility] MAMMO 2 [facility]   10/20/2025  3:00 PM Juana Tracy MD [facility] DEP   2/18/2026  7:20 AM Sotero Delgado MD [facility]             Requested Prescriptions     Pending Prescriptions Disp Refills    rosuvastatin (CRESTOR) 40 MG tablet 90 tablet 1     Sig: Take 1 tablet by mouth daily TAKE ONE TABLET BY MOUTH ONE TIME A DAY. THIS IS REPLACING ATORVASTATIN

## 2025-05-15 DIAGNOSIS — E78.49 OTHER HYPERLIPIDEMIA: ICD-10-CM

## 2025-05-15 RX ORDER — LISINOPRIL AND HYDROCHLOROTHIAZIDE 20; 25 MG/1; MG/1
1 TABLET ORAL DAILY
Qty: 90 TABLET | Refills: 1 | Status: SHIPPED | OUTPATIENT
Start: 2025-05-15

## 2025-05-15 RX ORDER — FENOFIBRATE 145 MG/1
145 TABLET, FILM COATED ORAL DAILY
Qty: 90 TABLET | Refills: 1 | Status: SHIPPED | OUTPATIENT
Start: 2025-05-15

## 2025-05-15 NOTE — TELEPHONE ENCOUNTER
Refill Request       Last Seen: Last Seen Department: 4/18/2025  Last Seen by PCP: Visit date not found    Last Written:    lisinopril-hydroCHLOROthiazide (PRINZIDE;ZESTORETIC) 20-25 MG per tablet 11/6/24 90 with 1    fenofibrate (TRICOR) 145 MG tablet 11/6/24 90 with 1    Next Appointment:   Future Appointments   Date Time Provider Department Center   5/27/2025  8:00 AM Jennifer Malin MD [facility] OB/GYN [facility]   7/25/2025  9:30 AM Juana Tracy MD [facility] DEP   7/28/2025  4:40 PM Manuel Penn MD [facility]   8/22/2025  9:30 AM [facility] MAMMO 2 [facility]   10/20/2025  3:00 PM Juana Tracy MD [facility] DEP   2/18/2026  7:20 AM Sotero Delgado MD [facility]       Future appointment scheduled      Requested Prescriptions     Pending Prescriptions Disp Refills    lisinopril-hydroCHLOROthiazide (PRINZIDE;ZESTORETIC) 20-25 MG per tablet 90 tablet 1     Sig: Take 1 tablet by mouth daily    fenofibrate (TRICOR) 145 MG tablet 90 tablet 1     Sig: Take 1 tablet by mouth daily

## 2025-05-27 ENCOUNTER — OFFICE VISIT (OUTPATIENT)
Dept: OBGYN CLINIC | Age: 50
End: 2025-05-27
Payer: COMMERCIAL

## 2025-05-27 VITALS
HEIGHT: 62 IN | WEIGHT: 278.2 LBS | OXYGEN SATURATION: 97 % | DIASTOLIC BLOOD PRESSURE: 82 MMHG | SYSTOLIC BLOOD PRESSURE: 128 MMHG | HEART RATE: 79 BPM | BODY MASS INDEX: 51.19 KG/M2

## 2025-05-27 DIAGNOSIS — N95.2 ATROPHIC VAGINITIS: ICD-10-CM

## 2025-05-27 DIAGNOSIS — Z01.419 WELL WOMAN EXAM WITH ROUTINE GYNECOLOGICAL EXAM: Primary | ICD-10-CM

## 2025-05-27 PROCEDURE — 99459 PELVIC EXAMINATION: CPT | Performed by: OBSTETRICS & GYNECOLOGY

## 2025-05-27 PROCEDURE — 99396 PREV VISIT EST AGE 40-64: CPT | Performed by: OBSTETRICS & GYNECOLOGY

## 2025-05-27 PROCEDURE — 3079F DIAST BP 80-89 MM HG: CPT | Performed by: OBSTETRICS & GYNECOLOGY

## 2025-05-27 PROCEDURE — 3074F SYST BP LT 130 MM HG: CPT | Performed by: OBSTETRICS & GYNECOLOGY

## 2025-05-27 ASSESSMENT — ENCOUNTER SYMPTOMS
VOMITING: 0
COUGH: 0
SHORTNESS OF BREATH: 0
CONSTIPATION: 0
ABDOMINAL PAIN: 0
NAUSEA: 0
DIARRHEA: 0

## 2025-05-27 NOTE — PROGRESS NOTES
[facility] Ob/Gyn   Annual Exam      CC:   Chief Complaint   Patient presents with    New Patient     Previous 7 [place] patient     Annual Exam     Last PAP: 10/04/23, Mammo:Aug 2024, Colonoscopy: ([place]2023)       HPI:  49 y.o.  presents for her gynecologic annual exam.    Health Maintenance:  Sexually Active: no   Sexual Issues: no   Contraception: n/a    Screening:  Last pap smear: ?  History of abnormal pap smears: yes, HPV  STI hx: no  Mammogram:   Colonoscopy: no  DEXA scan: n/a    Review of Systems:   Review of Systems   Constitutional:  Negative for activity change and fatigue.   Respiratory:  Negative for cough and shortness of breath.    Cardiovascular:  Negative for chest pain.   Gastrointestinal:  Negative for abdominal pain, constipation, diarrhea, nausea and vomiting.   Genitourinary:  Negative for difficulty urinating, dyspareunia, dysuria, menstrual problem, pelvic pain, vaginal bleeding, vaginal discharge and vaginal pain.   Musculoskeletal:  Negative for joint swelling and myalgias.   Allergic/Immunologic: Negative for environmental allergies.   Neurological:  Negative for dizziness and headaches.        Primary Care Physician: Juana Tracy MD    Obstetric History  OB History    Para Term  AB Living   1 1 1      SAB IAB Ectopic Molar Multiple Live Births        1      # Outcome Date GA Lbr Art/2nd Weight Sex Type Anes PTL Lv   1 Term                Gynecologic History  Menstrual History:  LMP: age 46    Post Menopausal Bleeding: no     Medical History:  Past Medical History:   Diagnosis Date    Abnormal Pap smear of cervix     Anxiety     BRCA1 negative     BRCA2 negative     Breast cancer (HCC)     DM type 2 with diabetic dyslipidemia (HCC)     Family history of BRCA gene positive     mom was BRCA positive, pt has never been tested    Family history of breast cancer in female     GERD (gastroesophageal reflux disease)     History of tobacco abuse

## 2025-05-28 LAB — HPV16+18+H RISK 12 DNA SPEC-IMP: NORMAL

## 2025-06-03 ENCOUNTER — RESULTS FOLLOW-UP (OUTPATIENT)
Dept: OBGYN CLINIC | Age: 50
End: 2025-06-03

## 2025-06-03 LAB
HPV HR 12 DNA SPEC QL NAA+PROBE: NOT DETECTED
HPV16 DNA SPEC QL NAA+PROBE: NOT DETECTED
HPV16+18+H RISK 12 DNA SPEC-IMP: NORMAL
HPV18 DNA SPEC QL NAA+PROBE: NOT DETECTED

## 2025-06-17 DIAGNOSIS — Z79.4 INSULIN DEPENDENT TYPE 2 DIABETES MELLITUS, CONTROLLED (HCC): ICD-10-CM

## 2025-06-17 DIAGNOSIS — E11.9 INSULIN DEPENDENT TYPE 2 DIABETES MELLITUS, CONTROLLED (HCC): ICD-10-CM

## 2025-06-17 RX ORDER — METFORMIN HYDROCHLORIDE 500 MG/1
500 TABLET, EXTENDED RELEASE ORAL 2 TIMES DAILY
Qty: 180 TABLET | Refills: 0 | Status: SHIPPED | OUTPATIENT
Start: 2025-06-17

## 2025-07-17 NOTE — TELEPHONE ENCOUNTER
Refill Request       Last Seen: Last Seen Department: 4/18/2025  Last Seen by PCP: 4/18/2025    Last Written: 1/13/25 180 1 refill    Next Appointment:   Future Appointments   Date Time Provider Department Center   7/25/2025  9:30 AM Juana Tracy MD [facility] ECC DEP   7/28/2025  4:40 PM Manuel Penn MD [facility]   8/29/2025  9:00 AM [facility] MAMMO [facility]   10/20/2025  3:00 PM Juana Tracy MD [facility] DEP   2/18/2026  7:20 AM [facility]             Requested Prescriptions     Pending Prescriptions Disp Refills    magnesium oxide (MAG-OX) 400 (240 Mg) MG tablet 180 tablet 1     Sig: Take 1 tablet by mouth 2 times daily

## 2025-07-18 RX ORDER — LANOLIN ALCOHOL/MO/W.PET/CERES
400 CREAM (GRAM) TOPICAL 2 TIMES DAILY
Qty: 180 TABLET | Refills: 1 | Status: SHIPPED | OUTPATIENT
Start: 2025-07-18

## 2025-07-25 ENCOUNTER — OFFICE VISIT (OUTPATIENT)
Dept: PRIMARY CARE CLINIC | Age: 50
End: 2025-07-25
Payer: COMMERCIAL

## 2025-07-25 VITALS
OXYGEN SATURATION: 95 % | HEART RATE: 77 BPM | HEIGHT: 62 IN | BODY MASS INDEX: 50.46 KG/M2 | WEIGHT: 274.2 LBS | SYSTOLIC BLOOD PRESSURE: 112 MMHG | DIASTOLIC BLOOD PRESSURE: 70 MMHG

## 2025-07-25 DIAGNOSIS — R79.89 ELEVATED LFTS: Primary | ICD-10-CM

## 2025-07-25 DIAGNOSIS — R79.89 ELEVATED LFTS: ICD-10-CM

## 2025-07-25 DIAGNOSIS — Z12.11 COLON CANCER SCREENING: ICD-10-CM

## 2025-07-25 LAB
ALBUMIN SERPL-MCNC: 4.7 G/DL (ref 3.4–5)
ALP SERPL-CCNC: 47 U/L (ref 40–129)
ALT SERPL-CCNC: 42 U/L (ref 10–40)
AST SERPL-CCNC: 52 U/L (ref 15–37)
BILIRUB DIRECT SERPL-MCNC: <0.1 MG/DL (ref 0–0.3)
BILIRUB INDIRECT SERPL-MCNC: 0.2 MG/DL (ref 0–1)
BILIRUB SERPL-MCNC: 0.3 MG/DL (ref 0–1)
PROT SERPL-MCNC: 7.9 G/DL (ref 6.4–8.2)

## 2025-07-25 PROCEDURE — 99213 OFFICE O/P EST LOW 20 MIN: CPT | Performed by: FAMILY MEDICINE

## 2025-07-25 PROCEDURE — 3078F DIAST BP <80 MM HG: CPT | Performed by: FAMILY MEDICINE

## 2025-07-25 PROCEDURE — 3074F SYST BP LT 130 MM HG: CPT | Performed by: FAMILY MEDICINE

## 2025-07-25 NOTE — PROGRESS NOTES
PROGRESS NOTE     Juana Tracy MD  [facility]                                  [address], [address], [place] [address]         Phone: [phone]    Date of Service:  7/25/2025     Patient ID: Satish Arteaga is a 49 y.o. female      Assessment / Plan:     1. Elevated LFTs  Will repeat LFTs.  Most likely culprit is [place].  If LFTs are stable or slightly improved we will continue to monitor.  If they worsen to 3-4 times upper limit of normal, will need to do further blood work to rule out other causes.  - Hepatic Function Panel; Future        HM:    Cologuard ordered today as is due (has never had a colonoscopy)    Annual mammogram done 8/20/24: normal.  Was seen by breast surgery clinic later that day for f/u eval.     Patient denies Shingrix vaccine.     Normal pap smear and negative HPV on 5/27/25    Subjective:     CC: elevated lfts    HPI  Patient here for 3-month follow-up of elevated LFTs.  These recently increased on blood work.  AST running at times higher than ALT.  Patient states she never drinks any alcohol.  No Tylenol.  No other medicines that would affect liver function.  She denies any change in color of stool or urine.  No abdominal pain.    Sees endocrinologist next Monday for diabetes follow-up as he manages her concentrated insulin.    Lab Results   Component Value Date    ALT 50 (H) 04/18/2025    AST 72 (H) 04/18/2025    ALKPHOS 60 04/18/2025    BILITOT <0.2 04/18/2025           Vitals:    07/25/25 0925   BP: 112/70   BP Site: Left Upper Arm   Patient Position: Sitting   BP Cuff Size: Large Adult   Pulse: 77   SpO2: 95%   Weight: 124.4 kg (274 lb 3.2 oz)   Height: 1.575 m (5' 2.01\")       Outpatient Medications Marked as Taking for the 7/25/25 encounter (Office Visit) with Juana Tracy MD   Medication Sig Dispense Refill    magnesium oxide (MAG-OX) 400 (240 Mg) MG tablet Take 1 tablet by mouth 2 times

## 2025-07-28 ENCOUNTER — OFFICE VISIT (OUTPATIENT)
Dept: ENDOCRINOLOGY | Age: 50
End: 2025-07-28
Payer: COMMERCIAL

## 2025-07-28 VITALS
DIASTOLIC BLOOD PRESSURE: 76 MMHG | HEIGHT: 62 IN | BODY MASS INDEX: 50.14 KG/M2 | SYSTOLIC BLOOD PRESSURE: 118 MMHG | OXYGEN SATURATION: 98 % | TEMPERATURE: 98 F | RESPIRATION RATE: 16 BRPM | HEART RATE: 82 BPM

## 2025-07-28 DIAGNOSIS — E11.9 INSULIN DEPENDENT TYPE 2 DIABETES MELLITUS, CONTROLLED (HCC): Primary | ICD-10-CM

## 2025-07-28 DIAGNOSIS — Z79.4 INSULIN DEPENDENT TYPE 2 DIABETES MELLITUS, CONTROLLED (HCC): Primary | ICD-10-CM

## 2025-07-28 DIAGNOSIS — R74.8 ELEVATED LIVER ENZYMES: ICD-10-CM

## 2025-07-28 LAB — HBA1C MFR BLD: 6.8 %

## 2025-07-28 PROCEDURE — 3078F DIAST BP <80 MM HG: CPT | Performed by: INTERNAL MEDICINE

## 2025-07-28 PROCEDURE — 3074F SYST BP LT 130 MM HG: CPT | Performed by: INTERNAL MEDICINE

## 2025-07-28 PROCEDURE — 3044F HG A1C LEVEL LT 7.0%: CPT | Performed by: INTERNAL MEDICINE

## 2025-07-28 PROCEDURE — 83036 HEMOGLOBIN GLYCOSYLATED A1C: CPT | Performed by: INTERNAL MEDICINE

## 2025-07-28 PROCEDURE — 99214 OFFICE O/P EST MOD 30 MIN: CPT | Performed by: INTERNAL MEDICINE

## 2025-07-28 NOTE — PROGRESS NOTES
Seen as f/u patient for diabetes      Interim:     Stable  No issues  7 lb more weight loss    26 lb weight loss    Dx with breast cancer  went through chemotherapy XRT  Had steroids with it    Diagnosed with Type 2 diabetes mellitus 4-5 yrs ago  Known diabetic complications: None    Current diabetic medications   U-500 pen 100 TID       SSI 5 for 50 >150     Metformin ER  500mg BID  jardiance 25mg'  Mounjaro 10mg    Previous  lantus 110 units HS  Novolog 25 TID + SSI 5 for 50>150  Usually takes 35-40  januvia 100mg  Was on victoza in the past for 2 months  Bydureon    Moderate, uncontrolled    Insulin started 3/13    Intolerance to diabetes medications: yes - Metformin     Last A1c 6.8%<----6.1%<--- 6.8%<----- 7.3%<----6%<----6.3%<---6.9%<-----6.9%<--- 6.9%<---7.1%<---- 7.4%<-----7.5%<------ 6.9%<-----7%<------6.6%<------6.6%<----- 6.8%<-----6.3 on 3/17<----6.3 on 12/16<----6.1 on 3/15  <---- 8.5 On 12/13<---- 9.6<---- 8.9 on 5/13<---10.8<---10.9    Prior visit with dietician: Yes   Current diet: on average, 3 meals per day 45gm CHO  Current exercise: No exercise for one month  Current monitoring regimen: home blood tests occ , mainly in evening    Has brought blood glucose log/meter:yes  Home blood sugar records:139-175  Any episodes of hypoglycemia? occ    No Hx of CAD , PVD, CVA    Hyperlipidemia: pravastatin 40mg fenofibrate 145   on 10/14-switched to lipitor 80mg LDL 73 on 2/16  LDL 96  HDL 29 on 3/18     LDL 82 on 2/19  Tolerating , no aches  LDL 76 on 3/20  LDL 93 on 5/21     LDL 53    on crestor and fish oil    Last eye exam: 11/24  Last foot exam:7/25  Last microalbumin to creatinine ratio: M/C nl on 5/19  ,   35 on 5/13 34 on 9/13  nl on 3/12---> 33.6 on 11/20----> 11/21 ---> 8/22--->11/24    HTN: Stable, tolerating Lisinopril 40mg HCTZ 25mg      Works from home    7/25 ALT 42 AST 52      Review of Systems    Scanned, reviewed    Vitals:    07/28/25 1639   BP: 118/76

## 2025-08-15 LAB — NONINV COLON CA DNA+OCC BLD SCRN STL QL: NEGATIVE

## 2025-08-25 ENCOUNTER — CLINICAL DOCUMENTATION (OUTPATIENT)
Dept: PHARMACY | Facility: CLINIC | Age: 50
End: 2025-08-25

## 2025-08-29 ENCOUNTER — HOSPITAL ENCOUNTER (OUTPATIENT)
Dept: ULTRASOUND IMAGING | Age: 50
Discharge: HOME | End: 2025-08-29
Payer: COMMERCIAL

## 2025-08-29 ENCOUNTER — HOSPITAL ENCOUNTER (OUTPATIENT)
Dept: WOMENS IMAGING | Age: 50
Discharge: HOME | End: 2025-08-29
Payer: COMMERCIAL

## 2025-08-29 VITALS — HEIGHT: 62 IN | BODY MASS INDEX: 51.53 KG/M2 | WEIGHT: 280 LBS

## 2025-08-29 DIAGNOSIS — R92.8 ABNORMAL MAMMOGRAM: ICD-10-CM

## 2025-08-29 DIAGNOSIS — R74.8 ELEVATED LIVER ENZYMES: ICD-10-CM

## 2025-08-29 DIAGNOSIS — Z79.4 INSULIN DEPENDENT TYPE 2 DIABETES MELLITUS, CONTROLLED (HCC): ICD-10-CM

## 2025-08-29 DIAGNOSIS — Z12.31 SCREENING MAMMOGRAM FOR BREAST CANCER: ICD-10-CM

## 2025-08-29 DIAGNOSIS — E11.9 INSULIN DEPENDENT TYPE 2 DIABETES MELLITUS, CONTROLLED (HCC): ICD-10-CM

## 2025-08-29 PROCEDURE — 77063 BREAST TOMOSYNTHESIS BI: CPT

## 2025-08-29 PROCEDURE — 76705 ECHO EXAM OF ABDOMEN: CPT

## 2025-08-29 PROCEDURE — 76642 ULTRASOUND BREAST LIMITED: CPT

## 2025-08-29 PROCEDURE — G0279 TOMOSYNTHESIS, MAMMO: HCPCS

## 2025-09-03 ENCOUNTER — HOSPITAL ENCOUNTER (OUTPATIENT)
Dept: WOMENS IMAGING | Age: 50
Discharge: HOME | End: 2025-09-03
Payer: COMMERCIAL

## 2025-09-03 DIAGNOSIS — K21.9 GASTROESOPHAGEAL REFLUX DISEASE WITHOUT ESOPHAGITIS: ICD-10-CM

## 2025-09-03 DIAGNOSIS — R92.8 ABNORMAL MAMMOGRAM: ICD-10-CM

## 2025-09-03 PROCEDURE — 77065 DX MAMMO INCL CAD UNI: CPT

## 2025-09-03 PROCEDURE — 88305 TISSUE EXAM BY PATHOLOGIST: CPT

## 2025-09-03 PROCEDURE — 2709999900 US BREAST BIOPSY W LOC DEVICE 1ST LESION LEFT

## 2025-09-03 RX ORDER — OMEPRAZOLE 20 MG/1
20 CAPSULE, DELAYED RELEASE ORAL 2 TIMES DAILY
Qty: 180 CAPSULE | Refills: 1 | Status: SHIPPED | OUTPATIENT
Start: 2025-09-03

## 2025-09-04 ENCOUNTER — TELEPHONE (OUTPATIENT)
Dept: WOMENS IMAGING | Age: 50
End: 2025-09-04

## (undated) DEVICE — GOWN SIRUS NONREIN LG W/TWL: Brand: MEDLINE INDUSTRIES, INC.

## (undated) DEVICE — YANKAUER,OPEN TIP,W/O VENT,STERILE: Brand: MEDLINE INDUSTRIES, INC.

## (undated) DEVICE — GOWN,SIRUS,POLYRNF,BRTHSLV,LG,30/CS: Brand: MEDLINE

## (undated) DEVICE — SUTURE PERMA-HAND SZ 2-0 L30IN NONABSORBABLE BLK L26MM SH K833H

## (undated) DEVICE — GLOVE SURG SZ 7 CRM LTX FREE POLYISOPRENE POLYMER BEAD ANTI

## (undated) DEVICE — ST FLUFF LG 1 PLY: Brand: DEROYAL

## (undated) DEVICE — SURGICAL SET UP - SURE SET: Brand: MEDLINE INDUSTRIES, INC.

## (undated) DEVICE — LIQUIBAND RAPID ADHESIVE 36/CS 0.8ML: Brand: MEDLINE

## (undated) DEVICE — SURE SET-DOUBLE BASIN-LF: Brand: MEDLINE INDUSTRIES, INC.

## (undated) DEVICE — NEEDLE HYPO 22GA L1.5IN BLK POLYPR HUB S STL REG BVL STR

## (undated) DEVICE — PROBE LOCALIZER W/DRAPE

## (undated) DEVICE — SUTURE PROL SZ 3-0 L36IN NONABSORBABLE BLU L26MM SH 1/2 CIR 8522H

## (undated) DEVICE — ADHESIVE SKIN CLSR 0.7ML TOP DERMBND ADV

## (undated) DEVICE — SUTURE VICRYL + SZ 3-0 L27IN ABSRB UD L26MM SH 1/2 CIR VCP416H

## (undated) DEVICE — ELECTROSURGICAL PENCIL ROCKER SWITCH NON COATED BLADE ELECTRODE 10 FT (3 M) CORD HOLSTER: Brand: MEGADYNE

## (undated) DEVICE — SOLUTION IRRIG 1000ML 0.9% SOD CHL USP POUR PLAS BTL

## (undated) DEVICE — GARMENT,MEDLINE,DVT,INT,CALF,MED, GEN2: Brand: MEDLINE

## (undated) DEVICE — APPLICATOR MEDICATED 26 CC SOLUTION HI LT ORNG CHLORAPREP

## (undated) DEVICE — SYRINGE MED 10ML LUERLOCK TIP W/O SFTY DISP

## (undated) DEVICE — SUTURE MONOCRYL + SZ 4-0 L18IN ABSRB UD L19MM PS-2 3/8 CIR MCP496G

## (undated) DEVICE — MINOR SET UP: Brand: MEDLINE INDUSTRIES, INC.

## (undated) DEVICE — SUTURE VCRL SZ 3-0 L27IN ABSRB UD L26MM SH 1/2 CIR J416H

## (undated) DEVICE — PLATE ES AD W 9FT CRD 2

## (undated) DEVICE — INTENDED USE FOR SURGICAL MARKING ON INTACT SKIN, ALSO PROVIDES A PERMANENT METHOD OF IDENTIFYING OBJECTS IN THE OPERATING ROOM: Brand: WRITESITE® PLUS MINI PREP RESISTANT MARKER

## (undated) DEVICE — C-ARM: Brand: UNBRANDED

## (undated) DEVICE — GLOVE SURG SZ 65 THK91MIL LTX FREE SYN POLYISOPRENE

## (undated) DEVICE — BLADE ES ELASTOMERIC COAT INSUL DURABLE BEND UPTO 90DEG

## (undated) DEVICE — DRAPE,T,LAPARO,TRANS,STERILE: Brand: MEDLINE

## (undated) DEVICE — INTENDED FOR TISSUE SEPARATION, AND OTHER PROCEDURES THAT REQUIRE A SHARP SURGICAL BLADE TO PUNCTURE OR CUT.: Brand: BARD-PARKER ® CARBON RIB-BACK BLADES

## (undated) DEVICE — COVER LT HNDL BLU PLAS

## (undated) DEVICE — SUTURE MCRYL SZ 4-0 L27IN ABSRB UD L19MM PS-2 1/2 CIR PRIM Y426H

## (undated) DEVICE — GLOVE,SURG,SENSICARE SLT,LF,PF,6.5: Brand: MEDLINE

## (undated) DEVICE — DRAPE,CHEST,FENES,15X10,STERIL: Brand: MEDLINE

## (undated) DEVICE — E-Z CLEAN, NON-STICK, PTFE COATED, ELECTROSURGICAL BLADE ELECTRODE, MODIFIED EXTENDED INSULATION, 2.5 INCH (6.35 CM): Brand: MEGADYNE

## (undated) DEVICE — PROVE COVER: Brand: UNBRANDED

## (undated) DEVICE — JEWISH HOSPITAL TURNOVER KIT: Brand: MEDLINE INDUSTRIES, INC.

## (undated) DEVICE — CONTAINER SPEC TRNSPRT DUBLIN